# Patient Record
Sex: MALE | Race: WHITE | NOT HISPANIC OR LATINO | Employment: OTHER | URBAN - METROPOLITAN AREA
[De-identification: names, ages, dates, MRNs, and addresses within clinical notes are randomized per-mention and may not be internally consistent; named-entity substitution may affect disease eponyms.]

---

## 2024-08-06 ENCOUNTER — TELEPHONE (OUTPATIENT)
Dept: FAMILY MEDICINE CLINIC | Facility: CLINIC | Age: 69
End: 2024-08-06

## 2024-08-06 ENCOUNTER — OFFICE VISIT (OUTPATIENT)
Dept: FAMILY MEDICINE CLINIC | Facility: CLINIC | Age: 69
End: 2024-08-06

## 2024-08-06 VITALS
WEIGHT: 155 LBS | TEMPERATURE: 97.8 F | OXYGEN SATURATION: 97 % | BODY MASS INDEX: 28.52 KG/M2 | SYSTOLIC BLOOD PRESSURE: 122 MMHG | RESPIRATION RATE: 14 BRPM | DIASTOLIC BLOOD PRESSURE: 73 MMHG | HEIGHT: 62 IN | HEART RATE: 67 BPM

## 2024-08-06 DIAGNOSIS — I10 PRIMARY HYPERTENSION: Primary | ICD-10-CM

## 2024-08-06 DIAGNOSIS — E11.9 TYPE 2 DIABETES MELLITUS WITHOUT COMPLICATION, WITHOUT LONG-TERM CURRENT USE OF INSULIN (HCC): ICD-10-CM

## 2024-08-06 DIAGNOSIS — Z86.73 HISTORY OF CVA (CEREBROVASCULAR ACCIDENT): ICD-10-CM

## 2024-08-06 DIAGNOSIS — E78.5 HYPERLIPIDEMIA, UNSPECIFIED HYPERLIPIDEMIA TYPE: ICD-10-CM

## 2024-08-06 DIAGNOSIS — N40.0 BENIGN PROSTATIC HYPERPLASIA, UNSPECIFIED WHETHER LOWER URINARY TRACT SYMPTOMS PRESENT: ICD-10-CM

## 2024-08-06 PROCEDURE — 99204 OFFICE O/P NEW MOD 45 MIN: CPT | Performed by: PHYSICIAN ASSISTANT

## 2024-08-06 PROCEDURE — 83036 HEMOGLOBIN GLYCOSYLATED A1C: CPT | Performed by: PHYSICIAN ASSISTANT

## 2024-08-06 RX ORDER — TAMSULOSIN HYDROCHLORIDE 0.4 MG/1
0.4 CAPSULE ORAL DAILY
Qty: 90 CAPSULE | Refills: 1 | Status: SHIPPED | OUTPATIENT
Start: 2024-08-06

## 2024-08-06 RX ORDER — TAMSULOSIN HYDROCHLORIDE 0.4 MG/1
0.4 CAPSULE ORAL DAILY
COMMUNITY
Start: 2024-07-05 | End: 2024-08-06 | Stop reason: SDUPTHER

## 2024-08-06 RX ORDER — SIMVASTATIN 40 MG
40 TABLET ORAL EVERY EVENING
Qty: 90 TABLET | Refills: 1 | Status: SHIPPED | OUTPATIENT
Start: 2024-08-06

## 2024-08-06 RX ORDER — LOSARTAN POTASSIUM 50 MG/1
50 TABLET ORAL DAILY
COMMUNITY
Start: 2024-07-26 | End: 2024-08-06 | Stop reason: SDUPTHER

## 2024-08-06 RX ORDER — LOSARTAN POTASSIUM 50 MG/1
50 TABLET ORAL DAILY
Qty: 90 TABLET | Refills: 1 | Status: SHIPPED | OUTPATIENT
Start: 2024-08-06

## 2024-08-06 RX ORDER — SIMVASTATIN 40 MG
40 TABLET ORAL EVERY EVENING
COMMUNITY
Start: 2024-07-05 | End: 2024-08-06 | Stop reason: SDUPTHER

## 2024-08-06 RX ORDER — SITAGLIPTIN 100 MG/1
100 TABLET, FILM COATED ORAL DAILY
COMMUNITY
Start: 2024-07-05 | End: 2024-08-06 | Stop reason: SDUPTHER

## 2024-08-06 NOTE — PATIENT INSTRUCTIONS
Financial Counselors   [phone]      [facility]  [address] [place] [address]   [phone]      [facility]  [address] [place], [facility] [address]  [place], [place] [address] [phone]  Fax#: [phone]    Dr. Olman Goldberg  [address] [facility] [address], [place], [place] [address] [phone]    [facility]  [address] [place] [address]  [phone]    [facility]  [address] [place], [place] [address] [phone]    [facility]  [address] [address], [place], [place] [id number] [phone]    [facility]  [address] [place] [address]   [phone]

## 2024-08-06 NOTE — LETTER
August 6, 2024     Patient: Duncan Sánchez  YOB: 1955  Date of Visit: 8/6/2024      To Whom it May Concern:    Duncan Sánchez is under my professional care. Duncan was seen in my office on 8/6/2024. Duncan has the following medical conditions: hypertension, hyperlipidemia, type 2 diabetes mellitus, history of stroke. Patient is unable to complete activities of daily living on his own and requires help including bathing, dressing, cooking, cleaning.   Due to these medical conditions, it is medically necessary for patient to have a home healthcare aide.     If you have any questions or concerns, please don't hesitate to call.         Sincerely,          Mena Alexis PA-C        CC: No Recipients

## 2024-08-06 NOTE — TELEPHONE ENCOUNTER
Did not call pt today. Called pt to verify. Pt denies needs.   Pt is requesting if he can receive call from social work, pt needs help with getting new [place] insurance and needs help with other difficulties. Please advise.

## 2024-08-06 NOTE — PROGRESS NOTES
Assessment/Plan:   -Will plan to complete updated labs once patient obtains insurance.    Primary hypertension  - Continue losartan 50 mg once daily.   - Currently blood pressure is controlled at this time.  Reviewed BP target goal with patient.    - Continue to maintain healthy balanced diet with focus on low salt intake. Limit alcohol intake.   - Advised to exercise at least 30 minutes a day for at least 5 days out of the week.     Hyperlipidemia  - Continue simvastatin 40 mg daily.    Type 2 diabetes mellitus without complication, without long-term current use of insulin (HCC)  - POCT hemoglobin A1c 6.2.  Patient unsure of his previous A1c.  - Continue metformin 1000 mg twice daily and Januvia 100 mg daily.  - Continue following diabetic diet with focus on low intake of carbohydrates and sugars.  - Will plan to perform diabetic foot exam at next visit.  - Patient unsure when last diabetic eye exam was.  Will plan to complete diabetic iris exam at future visit.    Lab Results   Component Value Date    HGBA1C 6.2 08/10/2024       Benign prostatic hyperplasia  - Stable.  Continue tamsulosin 0.4 mg daily.    History of CVA (cerebrovascular accident)  - Per patient, occurred around 2021. Residual right lower extremity weakness.   - Continue statin.   - Home healthcare aide letter provided to patient.       Return in about 1 month (around 9/6/2024) for Next scheduled follow up HTN; plz direct to financial counselors, thanks! .      Diagnoses and all orders for this visit:    Primary hypertension  -     losartan (COZAAR) 50 mg tablet; Take 1 tablet (50 mg total) by mouth daily    Hyperlipidemia, unspecified hyperlipidemia type  -     simvastatin (ZOCOR) 40 mg tablet; Take 1 tablet (40 mg total) by mouth every evening    Type 2 diabetes mellitus without complication, without long-term current use of insulin (HCC)  -     POCT hemoglobin A1c  -     Ambulatory Referral to Ophthalmology; Future  -     sitaGLIPtin (Januvia)  100 mg tablet; Take 1 tablet (100 mg total) by mouth daily  -     metFORMIN (GLUCOPHAGE) 1000 MG tablet; Take 1 tablet (1,000 mg total) by mouth 2 (two) times a day with meals    Benign prostatic hyperplasia, unspecified whether lower urinary tract symptoms present  -     tamsulosin (FLOMAX) 0.4 mg; Take 1 capsule (0.4 mg total) by mouth daily    History of CVA (cerebrovascular accident)    Other orders  -     Discontinue: Januvia 100 MG tablet; Take 100 mg by mouth daily  -     Discontinue: simvastatin (ZOCOR) 40 mg tablet; Take 40 mg by mouth every evening  -     Discontinue: metFORMIN (GLUCOPHAGE) 1000 MG tablet; Take 1,000 mg by mouth 2 (two) times a day with meals  -     Discontinue: losartan (COZAAR) 50 mg tablet; Take 50 mg by mouth daily  -     Discontinue: tamsulosin (FLOMAX) 0.4 mg; Take 0.4 mg by mouth daily        All of patients questions were answered. Patient understands and agrees with the above plan.     Mena Alexis PA-C  08/06/24  [facility]       Subjective:     Duncan Sánchez is a 69 y.o. male who  has no past medical history on file. who presented to the office today to establish care.     - Patient is a 69 y.o. male who presents today to establish care.  Patient notes he had been living in [place] and recently moved to [place] about 1 month ago.  Patient notes he currently has [place] Medicaid/Medicare so he is looking to switch his insurance.  Patient notes he has a home health care aide and requires an updated letter to continue with his home health care aide.  Patient notes he has a history of diabetes, high blood pressure, high cholesterol, BPH, history of stroke 3 years ago.  Patient is unsure of his last A1c.    -Patient notes since he suffered a stroke 3 years ago, he experiences some weakness of his right leg.  Patient notes he requires assistance with showering, putting his clothes on, cooking, cleaning.  Patient notes he does live with his home health aide.  Patient  reports recently he has been experiencing some forgetfulness as well.  Patient notes he uses both walker and cane.  He notes he does have some balance issues at times.    -Patient notes around 2019 he had been diagnosed with COVID-19 and was in the hospital.  Patient notes he fell while he was in the hospital and required surgery of his right leg.    Life Style  Tobacco Use: No  Alcohol Use: No  Drug Use: No      No current outpatient medications on file prior to visit.     No current facility-administered medications on file prior to visit.     History reviewed. No pertinent past medical history.  History reviewed. No pertinent surgical history.  Social History     Socioeconomic History    Marital status: Legally      Spouse name: None    Number of children: None    Years of education: None    Highest education level: None   Occupational History    None   Tobacco Use    Smoking status: Never    Smokeless tobacco: Never   Vaping Use    Vaping status: Never Used   Substance and Sexual Activity    Alcohol use: Yes     Comment: socially    Drug use: Never    Sexual activity: None   Other Topics Concern    None   Social History Narrative    None     Social Determinants of Health     Financial Resource Strain: Not on file   Food Insecurity: Not on file   Transportation Needs: Not on file   Physical Activity: Not on file   Stress: Not on file   Social Connections: Not on file   Intimate Partner Violence: Not on file   Housing Stability: Not on file     History reviewed. No pertinent family history.      Review of Systems   Constitutional:  Negative for chills and fever.   HENT:  Negative for congestion and sore throat.    Eyes:  Negative for pain and visual disturbance.   Respiratory:  Negative for cough, chest tightness and shortness of breath.    Cardiovascular:  Negative for chest pain, palpitations and leg swelling.   Gastrointestinal:  Negative for abdominal pain, constipation, diarrhea, nausea and vomiting.  "  Genitourinary:  Negative for difficulty urinating and dysuria.   Musculoskeletal:  Positive for arthralgias.   Skin:  Negative for rash.   Neurological:  Positive for numbness. Negative for dizziness and headaches.         BMI Counseling: Body mass index is 28.52 kg/m². The BMI is above normal. Nutrition recommendations include decreasing portion sizes, encouraging healthy choices of fruits and vegetables, consuming healthier snacks, limiting drinks that contain sugar, moderation in carbohydrate intake, increasing intake of lean protein and reducing intake of cholesterol. Exercise recommendations include moderate physical activity 150 minutes/week. No pharmacotherapy was ordered. Rationale for BMI follow-up plan is due to patient being overweight or obese.     Depression Screening and Follow-up Plan: Patient was screened for depression during today's encounter. They screened negative with a PHQ-2 score of 0.          Objective:  /73 (BP Location: Left arm, Patient Position: Sitting, Cuff Size: Standard)   Pulse 67   Temp 97.8 °F (36.6 °C) (Temporal)   Resp 14   Ht 5' 1.81\" (1.57 m)   Wt 70.3 kg (155 lb)   SpO2 97%   BMI 28.52 kg/m²     Physical Exam  Vitals and nursing note reviewed.   Constitutional:       General: He is not in acute distress.     Appearance: He is well-developed.   HENT:      Head: Normocephalic and atraumatic.      Right Ear: External ear normal.      Left Ear: External ear normal.      Nose: Nose normal.   Eyes:      Conjunctiva/sclera: Conjunctivae normal.   Cardiovascular:      Rate and Rhythm: Normal rate and regular rhythm.      Pulses: Normal pulses.      Heart sounds: Normal heart sounds.   Pulmonary:      Effort: Pulmonary effort is normal. No respiratory distress.      Breath sounds: Normal breath sounds. No wheezing.   Musculoskeletal:      Cervical back: Normal range of motion and neck supple.   Skin:     General: Skin is warm and dry.   Neurological:      Mental Status: " He is alert and oriented to person, place, and time.      Gait: Gait abnormal.   Psychiatric:         Behavior: Behavior normal.         PHQ-2/9 Depression Screening    Little interest or pleasure in doing things: 0 - not at all  Feeling down, depressed, or hopeless: 0 - not at all  PHQ-2 Score: 0  PHQ-2 Interpretation: Negative depression screen       - Utilized [email or web address] services for translation.    I have spent a total time of 50 minutes in caring for this patient on the day of the visit/encounter including Patient and family education, Documenting in the medical record, and Obtaining or reviewing history  .

## 2024-08-08 ENCOUNTER — PATIENT OUTREACH (OUTPATIENT)
Dept: FAMILY MEDICINE CLINIC | Facility: CLINIC | Age: 69
End: 2024-08-08

## 2024-08-08 DIAGNOSIS — Z78.9 NEEDS ASSISTANCE WITH COMMUNITY RESOURCES: Primary | ICD-10-CM

## 2024-08-08 NOTE — PROGRESS NOTES
ABHIJEET WOLF did receive an IB message from  staff stating that Pt is requesting if he can receive call from social work, pt needs help with getting new [place] insurance and needs help with other difficulties.     After chart review ABHIJEET WOLF called Pt to assist as needed. ABHIJEET WOLF introduced herself, her role and explained Pt the purpose of this call in [place]. Pt stated that he moved from [facility] to [place]. He has a house in [place] and is trying to sell it. He lives in  a room at his care giver house, he receives SSD, applied for [place] and S[place] benefits. Pt expressed that he needs assistance with rent assistance. ABHIJEET WOLF explained Pt that there are agencies that can help him with rent assistance if they have funding. ABHIJEET WOLF informed Pt that she will send the resources by mail. Pt agreeable to receive them.ABHIJEET WOLF will send Pt [employer] and [facility] information.     ABHIJEET WOLF and Pt discussed about [employer] and  [employer] however, Pt is aware that [facility] does not have funding at this time and order to receive help from [employer] Pt needs to have OP  treatment.     Regarding transportation and housing. Pt is willing to apply for that services in the future. ABHIJEET WOLF inquired Pt if there is any other social needs that Pt needs assistance with. Pt declined other social needs at this time. ABHIJEET WOLF informed Pt that this referral will be closed today however he can reach out the ABHIJEET  for further support Monday through Friday within office hours. Pt seems understanding and thankful for the ABHIJEET WOLF assistance.     Patient was referred on [employer] to    [facility]  [address] [place] [address]   [phone]    [facility]  [address] [id number] [phone]     ABHIJEET  is closing case today due to Pt denied social needs at this time.  ABHIJEET  is remain available for further assistance as needed.

## 2024-08-10 PROBLEM — N40.0 BENIGN PROSTATIC HYPERPLASIA: Status: ACTIVE | Noted: 2024-08-10

## 2024-08-10 LAB — SL AMB POCT HEMOGLOBIN AIC: 6.2 (ref ?–6.5)

## 2024-08-10 NOTE — ASSESSMENT & PLAN NOTE
- Per patient, occurred around 2021. Residual right lower extremity weakness.   - Continue statin.   - Home healthcare aide letter provided to patient.

## 2024-08-10 NOTE — ASSESSMENT & PLAN NOTE
- Continue losartan 50 mg once daily.   - Currently blood pressure is controlled at this time.  Reviewed BP target goal with patient.    - Continue to maintain healthy balanced diet with focus on low salt intake. Limit alcohol intake.   - Advised to exercise at least 30 minutes a day for at least 5 days out of the week.

## 2024-08-10 NOTE — ASSESSMENT & PLAN NOTE
- POCT hemoglobin A1c 6.2.  Patient unsure of his previous A1c.  - Continue metformin 1000 mg twice daily and Januvia 100 mg daily.  - Continue following diabetic diet with focus on low intake of carbohydrates and sugars.  - Will plan to perform diabetic foot exam at next visit.  - Patient unsure when last diabetic eye exam was.  Will plan to complete diabetic iris exam at future visit.    Lab Results   Component Value Date    HGBA1C 6.2 08/10/2024

## 2024-08-15 ENCOUNTER — TELEPHONE (OUTPATIENT)
Dept: FAMILY MEDICINE CLINIC | Facility: CLINIC | Age: 69
End: 2024-08-15

## 2024-08-15 NOTE — TELEPHONE ENCOUNTER
Pt states they received a letter regarding referral.     Informed that he has a referral to opthalmology. Asked them if they need number to make an appt. Pt states they already have the number and do not need it.

## 2024-08-19 ENCOUNTER — PATIENT OUTREACH (OUTPATIENT)
Dept: FAMILY MEDICINE CLINIC | Facility: CLINIC | Age: 69
End: 2024-08-19

## 2024-08-19 NOTE — PROGRESS NOTES
ABHIJEET WOLF did receive a call from Pt. Pt stated that he started [place] process and they asked for five years bank statements. He expressed was worried about it since was unsure to provide this information. ABHIJEET WOLF explained Pt that in order to complete the MA process he needs to provide the documents as requested. ABHIJEET WOLF inquired Pt if there is any other social needs or questions that he wants to share. Pt denied other social needs at this time. ABHIJEET WOLF explained Pt that he can reach out to ABHIJEET WOLF for further assistance Monday through Friday within office hours. Pt seems understanding and thankful for the ABHIJEET WOLF support.     ABHIJEET WOLF is remain available for further assistance as needed.

## 2024-09-03 ENCOUNTER — TELEPHONE (OUTPATIENT)
Dept: FAMILY MEDICINE CLINIC | Facility: CLINIC | Age: 69
End: 2024-09-03

## 2024-09-03 NOTE — TELEPHONE ENCOUNTER
The patient called and was told by the agency that the letter have to be be signed by a doctor.    Please advise, thank you

## 2024-09-03 NOTE — TELEPHONE ENCOUNTER
Tamika desai I just spoke with Mena regarding this pt  needs the letter that katye made for this pt be signed by a doctor do you think you can signed the letter so I can faxed to agency being that  isnt in the office today.     Please advise, thank you

## 2024-09-04 NOTE — TELEPHONE ENCOUNTER
Pt came to the office to  letter. Pt  request that letter be faxed to [facility] at     Fax been complete and letter given to the patient    Thank you

## 2024-09-04 NOTE — TELEPHONE ENCOUNTER
Pt called stating that the letter was not accepted by [employer].     [employer] will send a form that would need to be completed by his PCP    Office fax number provided.

## 2024-09-10 ENCOUNTER — TELEPHONE (OUTPATIENT)
Dept: FAMILY MEDICINE CLINIC | Facility: CLINIC | Age: 69
End: 2024-09-10

## 2024-09-10 NOTE — TELEPHONE ENCOUNTER
IEB FORM RECEIVED ON 9/10/2024  GIVEN TO TRI MENON TO BE COMPLETED, SIGNED BY MD/ (INCLUDE LISC. NUMBER), AND FAXED BACK IN 3 DAYS

## 2024-09-10 NOTE — TELEPHONE ENCOUNTER
FORM COMPLETED, SIGNED BY MD/ (INCLUDE LISC. NUMBER) FAXED ON 09/10/24 TO [facility] at [phone]. FAX CONFIRMATION RECEIVED. FORM GIVEN TO THOMAS TO SCAN

## 2024-09-17 ENCOUNTER — OFFICE VISIT (OUTPATIENT)
Dept: FAMILY MEDICINE CLINIC | Facility: CLINIC | Age: 69
End: 2024-09-17

## 2024-09-17 VITALS
SYSTOLIC BLOOD PRESSURE: 140 MMHG | OXYGEN SATURATION: 97 % | BODY MASS INDEX: 28.45 KG/M2 | DIASTOLIC BLOOD PRESSURE: 60 MMHG | HEART RATE: 67 BPM | HEIGHT: 62 IN | TEMPERATURE: 97.6 F | WEIGHT: 154.6 LBS | RESPIRATION RATE: 16 BRPM

## 2024-09-17 DIAGNOSIS — G62.9 NEUROPATHY: ICD-10-CM

## 2024-09-17 DIAGNOSIS — E78.5 HYPERLIPIDEMIA, UNSPECIFIED HYPERLIPIDEMIA TYPE: ICD-10-CM

## 2024-09-17 DIAGNOSIS — E11.9 TYPE 2 DIABETES MELLITUS WITHOUT COMPLICATION, WITHOUT LONG-TERM CURRENT USE OF INSULIN (HCC): ICD-10-CM

## 2024-09-17 DIAGNOSIS — Z86.73 HISTORY OF CVA (CEREBROVASCULAR ACCIDENT): ICD-10-CM

## 2024-09-17 DIAGNOSIS — Z12.11 COLON CANCER SCREENING: ICD-10-CM

## 2024-09-17 DIAGNOSIS — R26.89 BALANCE PROBLEM: ICD-10-CM

## 2024-09-17 DIAGNOSIS — G89.29 CHRONIC PAIN OF BOTH SHOULDERS: ICD-10-CM

## 2024-09-17 DIAGNOSIS — M54.50 ACUTE LEFT-SIDED LOW BACK PAIN WITHOUT SCIATICA: ICD-10-CM

## 2024-09-17 DIAGNOSIS — I10 PRIMARY HYPERTENSION: ICD-10-CM

## 2024-09-17 DIAGNOSIS — M25.511 CHRONIC PAIN OF BOTH SHOULDERS: ICD-10-CM

## 2024-09-17 DIAGNOSIS — M25.512 CHRONIC PAIN OF BOTH SHOULDERS: ICD-10-CM

## 2024-09-17 DIAGNOSIS — Z78.9 NEED FOR FOLLOW-UP BY SOCIAL WORKER: ICD-10-CM

## 2024-09-17 DIAGNOSIS — N40.0 BENIGN PROSTATIC HYPERPLASIA, UNSPECIFIED WHETHER LOWER URINARY TRACT SYMPTOMS PRESENT: ICD-10-CM

## 2024-09-17 DIAGNOSIS — Z00.00 ANNUAL PHYSICAL EXAM: Primary | ICD-10-CM

## 2024-09-17 PROCEDURE — 99397 PER PM REEVAL EST PAT 65+ YR: CPT | Performed by: PHYSICIAN ASSISTANT

## 2024-09-17 PROCEDURE — 99215 OFFICE O/P EST HI 40 MIN: CPT | Performed by: PHYSICIAN ASSISTANT

## 2024-09-17 RX ORDER — CYCLOBENZAPRINE HCL 10 MG
10 TABLET ORAL 2 TIMES DAILY PRN
Qty: 60 TABLET | Refills: 1 | Status: SHIPPED | OUTPATIENT
Start: 2024-09-17

## 2024-09-17 RX ORDER — TAMSULOSIN HYDROCHLORIDE 0.4 MG/1
0.4 CAPSULE ORAL DAILY
Qty: 90 CAPSULE | Refills: 1 | Status: SHIPPED | OUTPATIENT
Start: 2024-09-17

## 2024-09-17 RX ORDER — GABAPENTIN 300 MG/1
300 CAPSULE ORAL
Qty: 90 CAPSULE | Refills: 1 | Status: SHIPPED | OUTPATIENT
Start: 2024-09-17

## 2024-09-17 NOTE — PATIENT INSTRUCTIONS
Please call central scheduling at [phone] to schedule colonoscopy. Thanks!   Please call physical therapy to schedule an appointment. Thanks!   Please complete your blood work at earliest convenience. Thanks!     [facility]- Colonoscopy  Phone Number: [phone]      Dr. Isiah Sharp, DPM  [facility], [address] [address]  [place], [address]   [phone]

## 2024-09-17 NOTE — PROGRESS NOTES
ADULT ANNUAL PHYSICAL  [facility] [place]    NAME: Duncan Sánchez  AGE: 69 y.o. SEX: male  : 1955     DATE: 2024     Assessment and Plan:     Problem List Items Addressed This Visit          Cardiovascular and Mediastinum    Primary hypertension     - Continue losartan 50 mg once daily.   - Currently blood pressure is controlled at this time.  Reviewed BP target goal with patient.    - Continue to maintain healthy balanced diet with focus on low salt intake. Limit alcohol intake.   - Advised to exercise at least 30 minutes a day for at least 5 days out of the week.             Endocrine    Type 2 diabetes mellitus without complication, without long-term current use of insulin (HCC)     - Continue metformin 1000 mg twice daily and Januvia 100 mg daily.  - Continue following diabetic diet with focus on low intake of carbohydrates and sugars.  - Patient requesting referral to podiatry.  Referral placed today.  - Patient unsure when last diabetic eye exam was.  Patient requesting to see an eye doctor.  Referral previously placed.  Advised to call to schedule an appointment.    Lab Results   Component Value Date    HGBA1C 6.2 08/10/2024            Relevant Orders    Albumin / creatinine urine ratio (Completed)    CBC and differential (Completed)    Ambulatory Referral to Podiatry    Comprehensive metabolic panel (Completed)    Lipid panel (Completed)       Nervous and Auditory    Neuropathy     - Will trial gabapentin 300 mg daily at bedtime.          Relevant Medications    gabapentin (Neurontin) 300 mg capsule    Other Relevant Orders    Vitamin D 25 hydroxy (Completed)    Vitamin B12 (Completed)       Genitourinary    Benign prostatic hyperplasia     - Per patient, previously prescribed tamsulosin but was discontinued by his provider for unknown reason.  - Patient currently complaining of nocturia.  Will restart tamsulosin 0.4 mg daily.          Relevant Medications    tamsulosin (FLOMAX) 0.4 mg       Surgery/Wound/Pain    Acute left-sided low back pain without sciatica     - Will rx flexeril, BID PRN.  Discussed possible side effects of drowsiness and advised to avoid driving or operating heavy machinery when taking medication.  -Advised to apply ice/heating pad/topical therapies as needed to affected area.  - Provided patient with list of exercises to perform daily.  - If symptoms persist/worsen, would recommend referral to physical therapy.         Relevant Medications    cyclobenzaprine (FLEXERIL) 10 mg tablet    Chronic pain of both shoulders     - Will rx flexeril, BID PRN.  Discussed possible side effects of drowsiness and advised to avoid driving or operating heavy machinery when taking medication.  -Advised to apply ice/heating pad/topical therapies as needed to affected area.  - Provided patient with list of exercises to perform daily.  - If symptoms persist/worsen, would recommend referral to physical therapy.         Relevant Medications    cyclobenzaprine (FLEXERIL) 10 mg tablet       Neurology/Sleep    History of CVA (cerebrovascular accident)     - Per patient, occurred around 2021. Residual right lower extremity weakness.   - Patient experiencing balance issues. Currently using a cane.   - Will refer to physical therapy to help increase strength, mobility, function.  - Shower chair sent via [employer].   - Continue statin.          Relevant Orders    Ambulatory Referral to Physical Therapy    Balance problem    Relevant Orders    Ambulatory Referral to Physical Therapy       Other    Hyperlipidemia     - Continue simvastatin 40 mg daily.          Other Visit Diagnoses       Annual physical exam    -  Primary    Colon cancer screening        Relevant Orders    Ambulatory Referral to Gastroenterology    Need for follow-up by         Relevant Orders    Ambulatory Referral to Social Work Care Management Program             Immunizations and preventive care screenings were discussed with patient today. Appropriate education was printed on patient's after visit summary.    Counseling:  Alcohol/drug use: discussed moderation in alcohol intake, the recommendations for healthy alcohol use, and avoidance of illicit drug use.  Dental Health: discussed importance of regular tooth brushing, flossing, and dental visits.  Injury prevention: discussed safety/seat belts, safety helmets, smoke detectors, carbon dioxide detectors, and smoking near bedding or upholstery.  Sexual health: discussed sexually transmitted diseases, partner selection, use of condoms, avoidance of unintended pregnancy, and contraceptive alternatives.  Exercise: the importance of regular exercise/physical activity was discussed. Recommend exercise 3-5 times per week for at least 30 minutes.          Return in about 1 month (around 10/17/2024) for Next scheduled follow up T2DM (40  mins).     Chief Complaint:     Chief Complaint   Patient presents with    Follow-up    Hypertension      History of Present Illness:     Adult Annual Physical   Patient here for a comprehensive physical exam.     -Patient reports he did have a colonoscopy completed about 10 years ago when living in [place], but he never received the results.  He is open to completing repeat colonoscopy.    -Patient notes he does experience constipation frequently, however his home health caregiver provides him home remedies that helped him a lot.  He prefers to stick with the home remedies instead of medication at this time.    -Patient is requesting to see a foot doctor.    -Patient notes for long time he has been experiencing left lower back pain when bending or standing up.  Patient denies any known injury or trauma to the area. He denies any fevers, numbness or tingling down the legs, saddle anesthesia, or loss of bladder or bowel function.  Patient notes he also experiences chronic pain of bilateral  "shoulders.  He denies any injury or trauma.  He denies any numbness or tingling down bilateral arms.    -Patient notes since he suffered a stroke, he feels frequent \"pinching sensation\" of bilateral lower legs and numbness and tingling sensation of bilateral feet.     -Patient notes he has been experiencing some issues with his balance.  Patient notes he had fallen in the shower about 1 year ago.  Patient notes he previously had a shower chair but never used it.  Patient would like a shower chair for his new place.  Patient would like to try physical therapy to help with his balance.  He currently does use a cane.    -Patient notes he had life alert when he was living in [place] and he would like something similar since he has moved to [place].    -Patient notes he is urinating a lot throughout the night, generally waking up every hour.  Patient reports he was previously taking tamsulosin, but he reports his doctor discontinued it for unknown reason.    Diet and Physical Activity  Diet/Nutrition: heart healthy (low sodium) diet.   Exercise: walking.     General Health  Sleep:  average .   Hearing: normal - bilateral.  Vision: wears glasses and wants to establish with eye doctor. Referral previously placed. Advised to call to schedule an appointment.  .   Dental: no dental visits for >1 year.        Review of Systems:     Review of Systems   Constitutional:  Negative for chills and fever.   HENT:  Negative for congestion and sore throat.    Eyes:  Negative for pain and visual disturbance.   Respiratory:  Negative for cough, chest tightness and shortness of breath.    Cardiovascular:  Negative for chest pain, palpitations and leg swelling.   Gastrointestinal:  Positive for constipation (occasionally, controlled with home remedies). Negative for abdominal pain, diarrhea, nausea and vomiting.   Genitourinary:  Positive for frequency (at night). Negative for difficulty urinating and dysuria.   Musculoskeletal:  " Positive for arthralgias, back pain and gait problem.   Skin:  Negative for rash.   Neurological:  Positive for numbness. Negative for dizziness and headaches.        Balance issues      Past Medical History:     History reviewed. No pertinent past medical history.   Past Surgical History:     History reviewed. No pertinent surgical history.   Social History:     Social History     Socioeconomic History    Marital status: Legally      Spouse name: None    Number of children: None    Years of education: None    Highest education level: None   Occupational History    None   Tobacco Use    Smoking status: Never    Smokeless tobacco: Never   Vaping Use    Vaping status: Never Used   Substance and Sexual Activity    Alcohol use: Yes     Comment: socially    Drug use: Never    Sexual activity: None   Other Topics Concern    None   Social History Narrative    None     Social Determinants of Health     Financial Resource Strain: Not on file   Food Insecurity: Not on file   Transportation Needs: Not on file   Physical Activity: Not on file   Stress: Not on file   Social Connections: Not on file   Intimate Partner Violence: Not on file   Housing Stability: Not on file       Social Determinants of Health     Tobacco Use: Low Risk  (9/23/2024)    Patient History     Smoking Tobacco Use: Never     Smokeless Tobacco Use: Never     Passive Exposure: Not on file   Alcohol Use: Not on file   Financial Resource Strain: Not on file   Food Insecurity: Not on file   Transportation Needs: Not on file   Physical Activity: Not on file   Stress: Not on file   Social Connections: Not on file   Intimate Partner Violence: Not on file   Depression: Not at risk (8/6/2024)    PHQ-2     PHQ-2 Score: 0   Housing Stability: Not on file   Utilities: Not on file   Health Literacy: Not on file        Family History:     History reviewed. No pertinent family history.   Current Medications:     Current Outpatient Medications   Medication Sig  "Dispense Refill    cyclobenzaprine (FLEXERIL) 10 mg tablet Take 1 tablet (10 mg total) by mouth 2 (two) times a day as needed for muscle spasms 60 tablet 1    gabapentin (Neurontin) 300 mg capsule Take 1 capsule (300 mg total) by mouth daily at bedtime 90 capsule 1    tamsulosin (FLOMAX) 0.4 mg Take 1 capsule (0.4 mg total) by mouth daily 90 capsule 1    losartan (COZAAR) 50 mg tablet Take 1 tablet (50 mg total) by mouth daily 90 tablet 1    metFORMIN (GLUCOPHAGE) 1000 MG tablet Take 1 tablet (1,000 mg total) by mouth 2 (two) times a day with meals 180 tablet 1    simvastatin (ZOCOR) 40 mg tablet Take 1 tablet (40 mg total) by mouth every evening 90 tablet 1    sitaGLIPtin (Januvia) 100 mg tablet Take 1 tablet (100 mg total) by mouth daily 90 tablet 1     No current facility-administered medications for this visit.      Allergies:     No Known Allergies   Physical Exam:     /60 (BP Location: Left arm, Patient Position: Sitting, Cuff Size: Standard)   Pulse 67   Temp 97.6 °F (36.4 °C) (Temporal)   Resp 16   Ht 5' 1.81\" (1.57 m)   Wt 70.1 kg (154 lb 9.6 oz)   SpO2 97%   BMI 28.45 kg/m²     Physical Exam  Vitals and nursing note reviewed.   Constitutional:       General: He is not in acute distress.     Appearance: He is well-developed.   HENT:      Head: Normocephalic and atraumatic.      Right Ear: External ear normal.      Left Ear: External ear normal.      Nose: Nose normal.   Eyes:      Conjunctiva/sclera: Conjunctivae normal.   Cardiovascular:      Rate and Rhythm: Normal rate and regular rhythm.      Pulses: Normal pulses.      Heart sounds: Normal heart sounds.   Pulmonary:      Effort: Pulmonary effort is normal. No respiratory distress.      Breath sounds: Normal breath sounds. No wheezing.   Musculoskeletal:      Cervical back: Normal range of motion and neck supple.      Lumbar back: Tenderness (left paralumbar area) present. No swelling. Decreased range of motion. Negative right straight leg " raise test and negative left straight leg raise test.   Skin:     General: Skin is warm and dry.   Neurological:      Mental Status: He is alert and oriented to person, place, and time.      Gait: Gait abnormal.   Psychiatric:         Behavior: Behavior normal.         - Utilized [employer] services for translation.    I have spent a total time of 60 minutes in caring for this patient on the day of the visit/encounter including Risks and benefits of tx options, Instructions for management, Risk factor reductions, Impressions, Counseling / Coordination of care, Documenting in the medical record, and Reviewing / ordering tests, medicine, procedures  .        Mena Alexis PA-C   [facility] [place]

## 2024-09-18 ENCOUNTER — APPOINTMENT (OUTPATIENT)
Dept: LAB | Facility: HOSPITAL | Age: 69
End: 2024-09-18
Payer: MEDICARE

## 2024-09-18 ENCOUNTER — TELEPHONE (OUTPATIENT)
Age: 69
End: 2024-09-18

## 2024-09-18 ENCOUNTER — PREP FOR PROCEDURE (OUTPATIENT)
Age: 69
End: 2024-09-18

## 2024-09-18 DIAGNOSIS — G62.9 NEUROPATHY: ICD-10-CM

## 2024-09-18 DIAGNOSIS — Z12.11 SCREENING FOR COLON CANCER: Primary | ICD-10-CM

## 2024-09-18 DIAGNOSIS — E11.9 TYPE 2 DIABETES MELLITUS WITHOUT COMPLICATION, WITHOUT LONG-TERM CURRENT USE OF INSULIN (HCC): ICD-10-CM

## 2024-09-18 LAB
25(OH)D3 SERPL-MCNC: 28.1 NG/ML (ref 30–100)
ALBUMIN SERPL BCG-MCNC: 4.3 G/DL (ref 3.5–5)
ALP SERPL-CCNC: 55 U/L (ref 34–104)
ALT SERPL W P-5'-P-CCNC: 12 U/L (ref 7–52)
ANION GAP SERPL CALCULATED.3IONS-SCNC: 8 MMOL/L (ref 4–13)
AST SERPL W P-5'-P-CCNC: 13 U/L (ref 13–39)
BASOPHILS # BLD AUTO: 0.06 THOUSANDS/ΜL (ref 0–0.1)
BASOPHILS NFR BLD AUTO: 1 % (ref 0–1)
BILIRUB SERPL-MCNC: 0.39 MG/DL (ref 0.2–1)
BUN SERPL-MCNC: 13 MG/DL (ref 5–25)
CALCIUM SERPL-MCNC: 9.7 MG/DL (ref 8.4–10.2)
CHLORIDE SERPL-SCNC: 103 MMOL/L (ref 96–108)
CHOLEST SERPL-MCNC: 132 MG/DL
CO2 SERPL-SCNC: 28 MMOL/L (ref 21–32)
CREAT SERPL-MCNC: 0.84 MG/DL (ref 0.6–1.3)
CREAT UR-MCNC: 126.9 MG/DL
EOSINOPHIL # BLD AUTO: 0.17 THOUSAND/ΜL (ref 0–0.61)
EOSINOPHIL NFR BLD AUTO: 2 % (ref 0–6)
ERYTHROCYTE [DISTWIDTH] IN BLOOD BY AUTOMATED COUNT: 13 % (ref 11.6–15.1)
GFR SERPL CREATININE-BSD FRML MDRD: 88 ML/MIN/1.73SQ M
GLUCOSE P FAST SERPL-MCNC: 146 MG/DL (ref 65–99)
HCT VFR BLD AUTO: 36.2 % (ref 36.5–49.3)
HDLC SERPL-MCNC: 48 MG/DL
HGB BLD-MCNC: 11.6 G/DL (ref 12–17)
IMM GRANULOCYTES # BLD AUTO: 0.03 THOUSAND/UL (ref 0–0.2)
IMM GRANULOCYTES NFR BLD AUTO: 0 % (ref 0–2)
LDLC SERPL CALC-MCNC: 72 MG/DL (ref 0–100)
LYMPHOCYTES # BLD AUTO: 3.4 THOUSANDS/ΜL (ref 0.6–4.47)
LYMPHOCYTES NFR BLD AUTO: 37 % (ref 14–44)
MCH RBC QN AUTO: 29.4 PG (ref 26.8–34.3)
MCHC RBC AUTO-ENTMCNC: 32 G/DL (ref 31.4–37.4)
MCV RBC AUTO: 92 FL (ref 82–98)
MICROALBUMIN UR-MCNC: 12.2 MG/L
MICROALBUMIN/CREAT 24H UR: 10 MG/G CREATININE (ref 0–30)
MONOCYTES # BLD AUTO: 0.54 THOUSAND/ΜL (ref 0.17–1.22)
MONOCYTES NFR BLD AUTO: 6 % (ref 4–12)
NEUTROPHILS # BLD AUTO: 4.96 THOUSANDS/ΜL (ref 1.85–7.62)
NEUTS SEG NFR BLD AUTO: 54 % (ref 43–75)
NONHDLC SERPL-MCNC: 84 MG/DL
NRBC BLD AUTO-RTO: 0 /100 WBCS
PLATELET # BLD AUTO: 308 THOUSANDS/UL (ref 149–390)
PMV BLD AUTO: 10.9 FL (ref 8.9–12.7)
POTASSIUM SERPL-SCNC: 4.3 MMOL/L (ref 3.5–5.3)
PROT SERPL-MCNC: 7.4 G/DL (ref 6.4–8.4)
RBC # BLD AUTO: 3.95 MILLION/UL (ref 3.88–5.62)
SODIUM SERPL-SCNC: 139 MMOL/L (ref 135–147)
TRIGL SERPL-MCNC: 59 MG/DL
VIT B12 SERPL-MCNC: 529 PG/ML (ref 180–914)
WBC # BLD AUTO: 9.16 THOUSAND/UL (ref 4.31–10.16)

## 2024-09-18 PROCEDURE — 82570 ASSAY OF URINE CREATININE: CPT

## 2024-09-18 PROCEDURE — 85025 COMPLETE CBC W/AUTO DIFF WBC: CPT

## 2024-09-18 PROCEDURE — 80061 LIPID PANEL: CPT

## 2024-09-18 PROCEDURE — 36415 COLL VENOUS BLD VENIPUNCTURE: CPT

## 2024-09-18 PROCEDURE — 80053 COMPREHEN METABOLIC PANEL: CPT

## 2024-09-18 PROCEDURE — 82043 UR ALBUMIN QUANTITATIVE: CPT

## 2024-09-18 PROCEDURE — 82607 VITAMIN B-12: CPT

## 2024-09-18 PROCEDURE — 82306 VITAMIN D 25 HYDROXY: CPT

## 2024-09-18 NOTE — TELEPHONE ENCOUNTER
09/18/24  Screened by: Guadalupe Oneill    Referring Provider Reuben    Pre- Screening:     There is no height or weight on file to calculate BMI.  Has patient been referred for a routine screening Colonoscopy? yes  Is the patient between 45-75 years old? yes      Previous Colonoscopy yes   If yes:    Date: ??    Facility:     Reason:         Does the patient want to see a Gastroenterologist prior to their procedure OR are they having any GI symptoms? no    Has the patient been hospitalized or had abdominal surgery in the past 6 months? no    Does the patient use supplemental oxygen? no    Does the patient take Coumadin, Lovenox, Plavix, Elliquis, Xarelto, or other blood thinning medication? no    Has the patient had a stroke, cardiac event, or stent placed in the past year? no      If patient is between 45yrs - 49yrs, please advise patient that we will have to confirm benefits & coverage with their insurance company for a routine screening colonoscopy.

## 2024-09-18 NOTE — TELEPHONE ENCOUNTER
Scheduled date of colonoscopy (as of today):10/04/24  Physician performing colonoscopy:Ohm  Location of colonoscopy:   Bowel prep reviewed with patient: LILIANA/VALERIA mailed to pt  Instructions reviewed with patient by: SATISH  Clearances: Diabetic    : will call back

## 2024-09-18 NOTE — TELEPHONE ENCOUNTER
Please mail LILIANA/DUL prep instructions as well as diabetic prep instructions, both is Citizen of [place] if possible to pt. He is scheduled for colon with Dr Dietrich on 10/04/24

## 2024-09-23 PROBLEM — R26.89 BALANCE PROBLEM: Status: ACTIVE | Noted: 2024-09-23

## 2024-09-23 PROBLEM — M25.512 CHRONIC PAIN OF BOTH SHOULDERS: Status: ACTIVE | Noted: 2024-09-23

## 2024-09-23 PROBLEM — G89.29 CHRONIC PAIN OF BOTH SHOULDERS: Status: ACTIVE | Noted: 2024-09-23

## 2024-09-23 PROBLEM — G62.9 NEUROPATHY: Status: ACTIVE | Noted: 2024-09-23

## 2024-09-23 PROBLEM — M25.511 CHRONIC PAIN OF BOTH SHOULDERS: Status: ACTIVE | Noted: 2024-09-23

## 2024-09-23 PROBLEM — M54.50 ACUTE LEFT-SIDED LOW BACK PAIN WITHOUT SCIATICA: Status: ACTIVE | Noted: 2024-09-23

## 2024-09-23 NOTE — ASSESSMENT & PLAN NOTE
- Per patient, previously prescribed tamsulosin but was discontinued by his provider for unknown reason.  - Patient currently complaining of nocturia.  Will restart tamsulosin 0.4 mg daily.

## 2024-09-24 ENCOUNTER — TELEPHONE (OUTPATIENT)
Dept: GASTROENTEROLOGY | Facility: MEDICAL CENTER | Age: 69
End: 2024-09-24

## 2024-09-24 NOTE — ASSESSMENT & PLAN NOTE
- Continue metformin 1000 mg twice daily and Januvia 100 mg daily.  - Continue following diabetic diet with focus on low intake of carbohydrates and sugars.  - Patient requesting referral to podiatry.  Referral placed today.  - Patient unsure when last diabetic eye exam was.  Patient requesting to see an eye doctor.  Referral previously placed.  Advised to call to schedule an appointment.    Lab Results   Component Value Date    HGBA1C 6.2 08/10/2024

## 2024-09-24 NOTE — ASSESSMENT & PLAN NOTE
- Per patient, occurred around 2021. Residual right lower extremity weakness.   - Patient experiencing balance issues. Currently using a cane.   - Will refer to physical therapy to help increase strength, mobility, function.  - [facility]ower [place] [facility]   - Continue statin.

## 2024-09-24 NOTE — ASSESSMENT & PLAN NOTE
- Will rx flexeril, BID PRN.  Discussed possible side effects of drowsiness and advised to avoid driving or operating heavy machinery when taking medication.  -Advised to apply ice/heating pad/topical therapies as needed to affected area.  - Provided patient with list of exercises to perform daily.  - If symptoms persist/worsen, would recommend referral to physical therapy.

## 2024-09-24 NOTE — TELEPHONE ENCOUNTER
Mailed Prep instructions in Polish to patient address on file     Schedule Colonoscopy  (Newest Message First)  View All Conversations on this Encounter  Guadalupe Oneill routed conversation to Gastroenterology Madeleine Corral6 days ago     Guadalupe Oneill6 days ago     SATISH  Please mail LILIANA/DUL prep instructions as well as diabetic prep instructions, both is Urdu if possible to pt. He is scheduled for colon with Dr Dietrich on 10/04/24         Note         Guadalupe Oneill6 days ago     SATISH  Scheduled date of colonoscopy (as of today):10/04/24  Physician performing colonoscopy:Ohm  Location of colonoscopy:   Bowel prep reviewed with patient: LILIANA/DUL mailed to pt  Instructions reviewed with patient by: SATISH  Clearances: Diabetic     : will call back         Note         Guadalupe Oneill6 days ago     SATISH  09/18/24  Screened by: Guadalupe Oneill     Referring Provider Reuben     Pre- Screening:      There is no height or weight on file to calculate BMI.  Has patient been referred for a routine screening Colonoscopy? yes  Is the patient between 45-75 years old? yes        Previous Colonoscopy yes   If yes:    Date: ??    Facility:     Reason:            Does the patient want to see a Gastroenterologist prior to their procedure OR are they having any GI symptoms? no     Has the patient been hospitalized or had abdominal surgery in the past 6 months? no     Does the patient use supplemental oxygen? no     Does the patient take Coumadin, Lovenox, Plavix, Elliquis, Xarelto, or other blood thinning medication? no     Has the patient had a stroke, cardiac event, or stent placed in the past year? no        If patient is between 45yrs - 49yrs, please advise patient that we will have to confirm benefits & coverage with their insurance company for a routine screening colonoscopy.           Note         Recent Patient Communication     Last Update Description Specialty     6 days ago Schedule Colonoscopy Gastroenterology     Pg  Gastroenterology Pod Guadalupe Oneill Closed     1 week ago [facility] Family Medicine     Mena Art Closed     2 weeks ago Letter for School/Work Family Medicine     Noah Alcala Closed     1 month ago -- Family Medicine     Kait Rodrigez Closed

## 2024-09-26 ENCOUNTER — TELEPHONE (OUTPATIENT)
Dept: GASTROENTEROLOGY | Facility: MEDICAL CENTER | Age: 69
End: 2024-09-26

## 2024-09-26 NOTE — TELEPHONE ENCOUNTER
Via #[id number]    Patient stated he did not yet receive the instructions:  Will email to: [email or web address]  Patient requested address for procedure    Requested patient please call back at [phone] with any further questions, if he does not receive the instructions or needs to reschedule.

## 2024-09-26 NOTE — TELEPHONE ENCOUNTER
Confirming Upcoming Procedure: Colonoscopy on October 4  Physician performing: Dr. Dietrich  Location of procedure:    Prep: Miralax  Diabetic: Metformin

## 2024-10-03 ENCOUNTER — PATIENT OUTREACH (OUTPATIENT)
Dept: FAMILY MEDICINE CLINIC | Facility: CLINIC | Age: 69
End: 2024-10-03

## 2024-10-03 NOTE — PROGRESS NOTES
ABHIJEET WOLF received consult from Provider regarding pt want to apply for a Life Alert. Pt had one in [facility] and would like to get one here.     Per chart review, pt is applying for Waiver Services. The IEB form was completed and faxed.     ABHIJEET WOLF placed call to pt to follow-up and further assess. Introduced self and role in [place]. Pt states he moved here from [place] and he is applying for Waiver Services. Pt states he has an agency that is helping him with the process, he still waiting to see how many hours he gets approved.   Pt states he also applied for SNAP benefit but he the application still spending.     Pt report he received SSD but his income goes toward the rent and food.     ABHIJEET WOLF explained to pt after he get approved for Waiver Services he can request a Life Alert system through Waiver Services. Informs pt he can request the services with his . Informs he will be assigned to a SC and the SC will assist him getting any additional services will need. Informs he can get MOM meal. Pt verbalized understanding and thanked ABHIJEET WOLF for the information. Informs pt if he has any issues getting approve for Waiver Services, he can reach out to ABHIJEET WOLF and we could further assist him. Pt verbalized understanding.     No Further outreach. Will remains available as needed.

## 2024-10-04 ENCOUNTER — HOSPITAL ENCOUNTER (OUTPATIENT)
Dept: GASTROENTEROLOGY | Facility: HOSPITAL | Age: 69
Setting detail: OUTPATIENT SURGERY
End: 2024-10-04
Attending: INTERNAL MEDICINE
Payer: MEDICARE

## 2024-10-04 ENCOUNTER — ANESTHESIA EVENT (OUTPATIENT)
Dept: GASTROENTEROLOGY | Facility: HOSPITAL | Age: 69
End: 2024-10-04
Payer: MEDICARE

## 2024-10-04 ENCOUNTER — TELEPHONE (OUTPATIENT)
Age: 69
End: 2024-10-04

## 2024-10-04 ENCOUNTER — ANESTHESIA (OUTPATIENT)
Dept: GASTROENTEROLOGY | Facility: HOSPITAL | Age: 69
End: 2024-10-04
Payer: MEDICARE

## 2024-10-04 VITALS
WEIGHT: 150 LBS | HEIGHT: 62 IN | SYSTOLIC BLOOD PRESSURE: 172 MMHG | HEART RATE: 66 BPM | RESPIRATION RATE: 18 BRPM | TEMPERATURE: 97.7 F | BODY MASS INDEX: 27.6 KG/M2 | DIASTOLIC BLOOD PRESSURE: 72 MMHG | OXYGEN SATURATION: 97 %

## 2024-10-04 DIAGNOSIS — Z12.11 COLON CANCER SCREENING: ICD-10-CM

## 2024-10-04 DIAGNOSIS — Z12.11 SCREENING FOR COLON CANCER: ICD-10-CM

## 2024-10-04 LAB — GLUCOSE SERPL-MCNC: 176 MG/DL (ref 65–140)

## 2024-10-04 PROCEDURE — G0121 COLON CA SCRN NOT HI RSK IND: HCPCS | Performed by: STUDENT IN AN ORGANIZED HEALTH CARE EDUCATION/TRAINING PROGRAM

## 2024-10-04 PROCEDURE — 82948 REAGENT STRIP/BLOOD GLUCOSE: CPT

## 2024-10-04 RX ORDER — LIDOCAINE HYDROCHLORIDE 10 MG/ML
INJECTION, SOLUTION EPIDURAL; INFILTRATION; INTRACAUDAL; PERINEURAL AS NEEDED
Status: DISCONTINUED | OUTPATIENT
Start: 2024-10-04 | End: 2024-10-04

## 2024-10-04 RX ORDER — PROPOFOL 10 MG/ML
INJECTION, EMULSION INTRAVENOUS AS NEEDED
Status: DISCONTINUED | OUTPATIENT
Start: 2024-10-04 | End: 2024-10-04

## 2024-10-04 RX ORDER — SODIUM CHLORIDE, SODIUM LACTATE, POTASSIUM CHLORIDE, CALCIUM CHLORIDE 600; 310; 30; 20 MG/100ML; MG/100ML; MG/100ML; MG/100ML
125 INJECTION, SOLUTION INTRAVENOUS CONTINUOUS
Status: DISCONTINUED | OUTPATIENT
Start: 2024-10-04 | End: 2024-10-08 | Stop reason: HOSPADM

## 2024-10-04 RX ORDER — ACETAMINOPHEN 325 MG/1
650 TABLET ORAL EVERY 6 HOURS PRN
COMMUNITY

## 2024-10-04 RX ADMIN — PROPOFOL 50 MG: 10 INJECTION, EMULSION INTRAVENOUS at 07:59

## 2024-10-04 RX ADMIN — PROPOFOL 100 MG: 10 INJECTION, EMULSION INTRAVENOUS at 07:54

## 2024-10-04 RX ADMIN — LIDOCAINE HYDROCHLORIDE 50 MG: 10 SOLUTION INTRAVENOUS at 07:54

## 2024-10-04 RX ADMIN — SODIUM CHLORIDE, SODIUM LACTATE, POTASSIUM CHLORIDE, AND CALCIUM CHLORIDE 125 ML/HR: .6; .31; .03; .02 INJECTION, SOLUTION INTRAVENOUS at 07:32

## 2024-10-04 RX ADMIN — PROPOFOL 50 MG: 10 INJECTION, EMULSION INTRAVENOUS at 07:56

## 2024-10-04 RX ADMIN — PROPOFOL 20 MG: 10 INJECTION, EMULSION INTRAVENOUS at 08:05

## 2024-10-04 RX ADMIN — PROPOFOL 20 MG: 10 INJECTION, EMULSION INTRAVENOUS at 08:09

## 2024-10-04 RX ADMIN — PROPOFOL 40 MG: 10 INJECTION, EMULSION INTRAVENOUS at 08:02

## 2024-10-04 NOTE — TELEPHONE ENCOUNTER
Procedure: Colonoscopy  Date: 01/07/2025  Physician performing: Dr. Dietrich  Location of procedure:    Instructions given to patient: Federico  Diabetic: Metformin  Clearances: N/A

## 2024-10-04 NOTE — ANESTHESIA PREPROCEDURE EVALUATION
Procedure:  COLONOSCOPY    Relevant Problems   CARDIO   (+) Hyperlipidemia   (+) Primary hypertension      ENDO   (+) Type 2 diabetes mellitus without complication, without long-term current use of insulin (HCC)      /RENAL   (+) Benign prostatic hyperplasia      MUSCULOSKELETAL   (+) Acute left-sided low back pain without sciatica      NEURO/PSYCH   (+) Chronic pain of both shoulders        Physical Exam    Airway    Mallampati score: II  TM Distance: >3 FB  Neck ROM: full     Dental       Cardiovascular      Pulmonary      Other Findings        Anesthesia Plan  ASA Score- 2     Anesthesia Type- IV sedation with anesthesia with ASA Monitors.         Additional Monitors:     Airway Plan:            Plan Factors-Exercise tolerance (METS): >4 METS.    Chart reviewed.        Patient is not a current smoker.  Patient did not smoke on day of surgery.    Obstructive sleep apnea risk education given perioperatively.        Induction- intravenous.    Postoperative Plan-     Perioperative Resuscitation Plan - Level 1 - Full Code.       Informed Consent- Anesthetic plan and risks discussed with patient.  I personally reviewed this patient with the CRNA. Discussed and agreed on the Anesthesia Plan with the CRNA..      NPO appropriate. Discussed benefits/risks of monitored anesthetic care and discussed providing a dynamic level of mild to deep sedation. Risks include awareness, airway obstruction, aspiration which may necessitate conversion to general anesthesia. All questions answered. Patient understands and wishes to proceed.    Anesthesia plan and consent discussed with Duncan who expressed understanding and agreement. Risks/benefits and alternatives discussed with patient including possible PONV, sore throat, damage to teeth/lips/gums and possibility of rare anesthetic and surgical emergencies.

## 2024-10-04 NOTE — TELEPHONE ENCOUNTER
I spoke to pt and schedule the colonoscopy and 3 months.  I review the golytely prep with the pt I also mailed the prep with diabetic instruction.

## 2024-10-04 NOTE — TELEPHONE ENCOUNTER
----- Message from Osvaldo Dietrich MD sent at 10/4/2024  8:28 AM EDT -----  Regarding: Repeat colon in 3 months  Hi,    This patient had a poor prep. Can he have a repeat colon in 3 months with Golytely bowel prep? Also, he ate food yesterday so he should have instructions reviewed.    He is [place]-speaking.    Joseph

## 2024-10-04 NOTE — ANESTHESIA POSTPROCEDURE EVALUATION
Post-Op Assessment Note    CV Status:  Stable    Pain management: satisfactory to patient       Mental Status:  Sleepy and arousable   Hydration Status:  Euvolemic   PONV Controlled:  Controlled   Airway Patency:  Patent     Post Op Vitals Reviewed: Yes    No anethesia notable event occurred.    Staff: Anesthesiologist, CRNA           /65 (10/04/24 0816)    Temp 97.7 °F (36.5 °C) (10/04/24 0816)    Pulse 80 (10/04/24 0816)   Resp 16 (10/04/24 0816)    SpO2 98 % (10/04/24 0816)

## 2024-10-08 NOTE — ANESTHESIA POSTPROCEDURE EVALUATION
Post-Op Assessment Note            No anethesia notable event occurred.    Staff: Anesthesiologist           Last Filed PACU Vitals:  Vitals Value Taken Time   Temp 97.7 °F (36.5 °C) 10/04/24 0816   Pulse 66 10/04/24 0831   /72 10/04/24 0831   Resp 18 10/04/24 0831   SpO2 97 % 10/04/24 0831       Modified [place]:  No data recorded

## 2024-10-21 ENCOUNTER — HOSPITAL ENCOUNTER (OUTPATIENT)
Dept: RADIOLOGY | Facility: HOSPITAL | Age: 69
Discharge: HOME | End: 2024-10-21
Payer: MEDICARE

## 2024-10-21 ENCOUNTER — OFFICE VISIT (OUTPATIENT)
Dept: FAMILY MEDICINE CLINIC | Facility: CLINIC | Age: 69
End: 2024-10-21

## 2024-10-21 VITALS
TEMPERATURE: 97.6 F | DIASTOLIC BLOOD PRESSURE: 60 MMHG | HEIGHT: 62 IN | BODY MASS INDEX: 28.26 KG/M2 | WEIGHT: 153.6 LBS | OXYGEN SATURATION: 95 % | HEART RATE: 89 BPM | RESPIRATION RATE: 16 BRPM | SYSTOLIC BLOOD PRESSURE: 130 MMHG

## 2024-10-21 DIAGNOSIS — M25.511 CHRONIC PAIN OF BOTH SHOULDERS: ICD-10-CM

## 2024-10-21 DIAGNOSIS — G89.29 CHRONIC PAIN OF BOTH SHOULDERS: ICD-10-CM

## 2024-10-21 DIAGNOSIS — E78.5 HYPERLIPIDEMIA, UNSPECIFIED HYPERLIPIDEMIA TYPE: ICD-10-CM

## 2024-10-21 DIAGNOSIS — Z86.73 HISTORY OF CVA (CEREBROVASCULAR ACCIDENT): ICD-10-CM

## 2024-10-21 DIAGNOSIS — G62.9 NEUROPATHY: ICD-10-CM

## 2024-10-21 DIAGNOSIS — N40.0 BENIGN PROSTATIC HYPERPLASIA, UNSPECIFIED WHETHER LOWER URINARY TRACT SYMPTOMS PRESENT: ICD-10-CM

## 2024-10-21 DIAGNOSIS — M54.50 ACUTE LEFT-SIDED LOW BACK PAIN WITHOUT SCIATICA: ICD-10-CM

## 2024-10-21 DIAGNOSIS — I10 PRIMARY HYPERTENSION: ICD-10-CM

## 2024-10-21 DIAGNOSIS — E11.9 TYPE 2 DIABETES MELLITUS WITHOUT COMPLICATION, WITHOUT LONG-TERM CURRENT USE OF INSULIN (HCC): ICD-10-CM

## 2024-10-21 DIAGNOSIS — M54.50 ACUTE LEFT-SIDED LOW BACK PAIN WITHOUT SCIATICA: Primary | ICD-10-CM

## 2024-10-21 DIAGNOSIS — M25.512 CHRONIC PAIN OF BOTH SHOULDERS: ICD-10-CM

## 2024-10-21 PROCEDURE — 72110 X-RAY EXAM L-2 SPINE 4/>VWS: CPT

## 2024-10-21 PROCEDURE — 99214 OFFICE O/P EST MOD 30 MIN: CPT | Performed by: PHYSICIAN ASSISTANT

## 2024-10-21 NOTE — ASSESSMENT & PLAN NOTE
- Continue metformin 1000 mg twice daily and Januvia 100 mg daily.  - Continue following diabetic diet with focus on low intake of carbohydrates and sugars.  - Patient requesting referral to podiatry.  Referral previously placed.  Advised to call to schedule an appointment.  - Patient unsure when last diabetic eye exam was.  Patient requesting to see an eye doctor.  Referral previously placed.  Advised to call to schedule an appointment.  -Reviewed albumin/creatinine urine ratio from September 2024.  Results are normal.    Lab Results   Component Value Date    HGBA1C 6.2 08/10/2024

## 2024-10-21 NOTE — ASSESSMENT & PLAN NOTE
- Much improved since starting gabapentin.  - Continue gabapentin 300 mg daily at bedtime.  -Reviewed CBC, CMP, vitamin B12 from September 2024.  Results are within normal range.

## 2024-10-21 NOTE — ASSESSMENT & PLAN NOTE
- Per patient, occurred around 2021. Residual right lower extremity weakness.   - Patient experiencing balance issues. Currently using a cane.   -Patient previously referred to physical therapy to help increase strength, mobility, function.  Advised to call to schedule an appointment to establish care.  - Continue statin.

## 2024-10-21 NOTE — ASSESSMENT & PLAN NOTE
-Stable.  Continue flexeril, BID PRN.   -Advised to apply ice/heating pad/topical therapies as needed to affected area.  - Provided patient with list of exercises to perform daily.  - If symptoms persist/worsen, would recommend referral to physical therapy.

## 2024-10-21 NOTE — PATIENT INSTRUCTIONS
1.) Recommend stopping flomax for one week and see how your dry mouth symptoms are without it.   2.) Please complete your back x-ray at earliest convenience. Thanks!

## 2024-10-21 NOTE — ASSESSMENT & PLAN NOTE
- Tamsulosin 0.4 mg daily restarted at last visit, however patient is now complaining of dry mouth side effects.  Explained to patient this could be secondary to tamsulosin use.  - Recommend patient trial 1 week without tamsulosin and see if his dry mouth symptoms improved.  If they do, could consider replacing tamsulosin with alternative medication.

## 2024-10-21 NOTE — ASSESSMENT & PLAN NOTE
-Continue flexeril, BID PRN.  Discussed possible side effects of drowsiness and advised to avoid driving or operating heavy machinery when taking medication.  -Will obtain lumbar spine x-ray for further evaluation.  -Advised to apply ice/heating pad/topical therapies as needed to affected area.  - Provided patient with list of exercises to perform daily.  - If symptoms persist/worsen, would recommend referral to physical therapy.     Orders:    XR spine lumbar minimum 4 views non injury; Future

## 2024-10-21 NOTE — PROGRESS NOTES
Ambulatory Visit  Name: Duncan Sánchez      : 1955      MRN: [id number]  Encounter Provider: Mena Alexis PA-C  Encounter Date: 10/21/2024   Encounter department: [facility]    Assessment & Plan  Acute left-sided low back pain without sciatica  -Continue flexeril, BID PRN.  Discussed possible side effects of drowsiness and advised to avoid driving or operating heavy machinery when taking medication.  -Will obtain lumbar spine x-ray for further evaluation.  -Advised to apply ice/heating pad/topical therapies as needed to affected area.  - Provided patient with list of exercises to perform daily.  - If symptoms persist/worsen, would recommend referral to physical therapy.     Orders:    XR spine lumbar minimum 4 views non injury; Future    Neuropathy  - Much improved since starting gabapentin.  - Continue gabapentin 300 mg daily at bedtime.  -Reviewed CBC, CMP, vitamin B12 from 2024.  Results are within normal range.       Hyperlipidemia, unspecified hyperlipidemia type  - Continue simvastatin 40 mg daily.  -Reviewed lipid panel from 2024.         History of CVA (cerebrovascular accident)  - Per patient, occurred around . Residual right lower extremity weakness.   - Patient experiencing balance issues. Currently using a cane.   -Patient previously referred to physical therapy to help increase strength, mobility, function.  Advised to call to schedule an appointment to establish care.  - Continue statin.          Chronic pain of both shoulders  -Stable.  Continue flexeril, BID PRN.   -Advised to apply ice/heating pad/topical therapies as needed to affected area.  - Provided patient with list of exercises to perform daily.  - If symptoms persist/worsen, would recommend referral to physical therapy.         Primary hypertension  - Continue losartan 50 mg once daily.   - Currently blood pressure is controlled at this time.  Reviewed BP target goal  with patient.    - Continue to maintain healthy balanced diet with focus on low salt intake. Limit alcohol intake.   - Advised to exercise at least 30 minutes a day for at least 5 days out of the week.          Type 2 diabetes mellitus without complication, without long-term current use of insulin ([facility])  - Continue metformin 1000 mg twice daily and Januvia 100 mg daily.  - Continue following diabetic diet with focus on low intake of carbohydrates and sugars.  - Patient requesting referral to podiatry.  Referral previously placed.  Advised to call to schedule an appointment.  - Patient unsure when last diabetic eye exam was.  Patient requesting to see an eye doctor.  Referral previously placed.  Advised to call to schedule an appointment.  -Reviewed albumin/creatinine urine ratio from September 2024.  Results are normal.    Lab Results   Component Value Date    HGBA1C 6.2 08/10/2024            Benign prostatic hyperplasia, unspecified whether lower urinary tract symptoms present  - Tamsulosin 0.4 mg daily restarted at last visit, however patient is now complaining of dry mouth side effects.  Explained to patient this could be secondary to tamsulosin use.  - Recommend patient trial 1 week without tamsulosin and see if his dry mouth symptoms improved.  If they do, could consider replacing tamsulosin with alternative medication.            History of Present Illness       Patient is a 69 y.o. male whom  has a past medical history of Benign prostatic hyperplasia (08/10/2024), History of CVA (cerebrovascular accident) (08/06/2024), Hyperlipidemia (08/06/2024), Neuropathy (09/23/2024), Primary hypertension (08/06/2024), and Type 2 diabetes mellitus without complication, without long-term current use of insulin (HCC) (08/06/2024). who is seen today in office for back pain follow up.    -Patient notes he did complete the colonoscopy, but he had poor prep so he has to repeat it in January.  Patient notes he does continue with  left lower back pain when bending or standing up.  Patient is requesting an x-ray to check this out further.  Patient notes he has been taking the Flexeril as needed and it helps him to sleep.    -Patient notes the pinching sensation, numbness, tingling sensation of bilateral lower legs and feet has much improved since he started the gabapentin.    -Patient notes he would still like to follow-up with physical therapy to help with his balance, but he has not had time to make an appointment as of yet.  Patient notes recently he has been experiencing dry mouth.  Patient notes he has had this previously and it did get very bad where his tongue was very dry and he was having difficulty speaking.  Patient notes he is also experiencing some burning sensation of his tongue.      History obtained from : patient  Review of Systems   Constitutional:  Negative for chills and fever.   HENT:  Negative for congestion and sore throat.         Dry mouth, burning sensation of tongue   Eyes:  Negative for pain and visual disturbance.   Respiratory:  Negative for cough, chest tightness and shortness of breath.    Cardiovascular:  Negative for chest pain, palpitations and leg swelling.   Gastrointestinal:  Positive for constipation (occasionally, controlled with home remedies). Negative for abdominal pain, diarrhea, nausea and vomiting.   Genitourinary:  Negative for difficulty urinating, dysuria and frequency.   Musculoskeletal:  Positive for arthralgias, back pain and gait problem.   Skin:  Negative for rash.   Neurological:  Positive for numbness. Negative for dizziness and headaches.        Balance issues     Pertinent Medical History     Medical History Reviewed by provider this encounter:  Tobacco  Allergies  Meds  Problems  Med Hx  Surg Hx  Fam Hx       Past Medical History   Past Medical History:   Diagnosis Date    Benign prostatic hyperplasia 08/10/2024    History of CVA (cerebrovascular accident) 08/06/2024     Hyperlipidemia 08/06/2024    Neuropathy 09/23/2024    Primary hypertension 08/06/2024    Type 2 diabetes mellitus without complication, without long-term current use of insulin (HCC) 08/06/2024     History reviewed. No pertinent surgical history.  History reviewed. No pertinent family history.  Current Outpatient Medications on File Prior to Visit   Medication Sig Dispense Refill    acetaminophen (TYLENOL) 325 mg tablet Take 650 mg by mouth every 6 (six) hours as needed for mild pain      cyclobenzaprine (FLEXERIL) 10 mg tablet Take 1 tablet (10 mg total) by mouth 2 (two) times a day as needed for muscle spasms 60 tablet 1    gabapentin (Neurontin) 300 mg capsule Take 1 capsule (300 mg total) by mouth daily at bedtime 90 capsule 1    losartan (COZAAR) 50 mg tablet Take 1 tablet (50 mg total) by mouth daily 90 tablet 1    metFORMIN (GLUCOPHAGE) 1000 MG tablet Take 1 tablet (1,000 mg total) by mouth 2 (two) times a day with meals 180 tablet 1    simvastatin (ZOCOR) 40 mg tablet Take 1 tablet (40 mg total) by mouth every evening 90 tablet 1    sitaGLIPtin (Januvia) 100 mg tablet Take 1 tablet (100 mg total) by mouth daily 90 tablet 1    tamsulosin (FLOMAX) 0.4 mg Take 1 capsule (0.4 mg total) by mouth daily 90 capsule 1     No current facility-administered medications on file prior to visit.   No Known Allergies   Current Outpatient Medications on File Prior to Visit   Medication Sig Dispense Refill    acetaminophen (TYLENOL) 325 mg tablet Take 650 mg by mouth every 6 (six) hours as needed for mild pain      cyclobenzaprine (FLEXERIL) 10 mg tablet Take 1 tablet (10 mg total) by mouth 2 (two) times a day as needed for muscle spasms 60 tablet 1    gabapentin (Neurontin) 300 mg capsule Take 1 capsule (300 mg total) by mouth daily at bedtime 90 capsule 1    losartan (COZAAR) 50 mg tablet Take 1 tablet (50 mg total) by mouth daily 90 tablet 1    metFORMIN (GLUCOPHAGE) 1000 MG tablet Take 1 tablet (1,000 mg total) by mouth 2  "(two) times a day with meals 180 tablet 1    simvastatin (ZOCOR) 40 mg tablet Take 1 tablet (40 mg total) by mouth every evening 90 tablet 1    sitaGLIPtin (Januvia) 100 mg tablet Take 1 tablet (100 mg total) by mouth daily 90 tablet 1    tamsulosin (FLOMAX) 0.4 mg Take 1 capsule (0.4 mg total) by mouth daily 90 capsule 1     No current facility-administered medications on file prior to visit.      Social History     Tobacco Use    Smoking status: Never    Smokeless tobacco: Never   Vaping Use    Vaping status: Never Used   Substance and Sexual Activity    Alcohol use: Yes     Comment: socially    Drug use: Never    Sexual activity: Not on file         Objective     /60 (BP Location: Left arm, Patient Position: Sitting, Cuff Size: Standard)   Pulse 89   Temp 97.6 °F (36.4 °C) (Temporal)   Resp 16   Ht 5' 1.81\" (1.57 m)   Wt 69.7 kg (153 lb 9.6 oz)   SpO2 95%   BMI 28.27 kg/m²     Physical Exam  Vitals and nursing note reviewed.   Constitutional:       General: He is not in acute distress.     Appearance: Normal appearance. He is well-developed.      Comments: Uses cane with ambulation   HENT:      Head: Normocephalic and atraumatic.      Right Ear: External ear normal.      Left Ear: External ear normal.      Nose: Nose normal.      Mouth/Throat:      Pharynx: Uvula midline.   Eyes:      Conjunctiva/sclera: Conjunctivae normal.   Cardiovascular:      Rate and Rhythm: Normal rate and regular rhythm.      Pulses: Normal pulses.      Heart sounds: Normal heart sounds. No murmur heard.  Pulmonary:      Effort: Pulmonary effort is normal. No respiratory distress.      Breath sounds: Normal breath sounds. No wheezing.   Musculoskeletal:      Cervical back: Normal range of motion and neck supple.      Lumbar back: Tenderness (left paralumbar area) present. No swelling. Decreased range of motion. Negative right straight leg raise test and negative left straight leg raise test.   Skin:     General: Skin is warm " and dry.      Capillary Refill: Capillary refill takes less than 2 seconds.   Neurological:      Mental Status: He is alert and oriented to person, place, and time.      Gait: Gait abnormal.   Psychiatric:         Speech: Speech normal.         Behavior: Behavior normal.           - Utilized [employer] services for translation.

## 2024-12-31 ENCOUNTER — TELEPHONE (OUTPATIENT)
Dept: FAMILY MEDICINE CLINIC | Facility: CLINIC | Age: 69
End: 2024-12-31

## 2024-12-31 DIAGNOSIS — E11.9 TYPE 2 DIABETES MELLITUS WITHOUT COMPLICATION, WITHOUT LONG-TERM CURRENT USE OF INSULIN (HCC): ICD-10-CM

## 2024-12-31 DIAGNOSIS — I10 PRIMARY HYPERTENSION: ICD-10-CM

## 2024-12-31 RX ORDER — LOSARTAN POTASSIUM 50 MG/1
50 TABLET ORAL DAILY
Qty: 90 TABLET | Refills: 1 | Status: SHIPPED | OUTPATIENT
Start: 2024-12-31 | End: 2025-01-08 | Stop reason: SDUPTHER

## 2024-12-31 NOTE — TELEPHONE ENCOUNTER
Good afternoon,    Pt is requesting a refill for medication:    losartan (COZAAR) 50 mg tablet [[id number]]     sitaGLIPtin (Januvia) 100 mg tablet [[id number]]

## 2025-01-08 ENCOUNTER — OFFICE VISIT (OUTPATIENT)
Dept: FAMILY MEDICINE CLINIC | Facility: CLINIC | Age: 70
End: 2025-01-08

## 2025-01-08 VITALS
RESPIRATION RATE: 18 BRPM | DIASTOLIC BLOOD PRESSURE: 80 MMHG | BODY MASS INDEX: 28.89 KG/M2 | HEIGHT: 61 IN | SYSTOLIC BLOOD PRESSURE: 150 MMHG | OXYGEN SATURATION: 98 % | TEMPERATURE: 98.3 F | HEART RATE: 95 BPM | WEIGHT: 153 LBS

## 2025-01-08 DIAGNOSIS — G62.9 NEUROPATHY: ICD-10-CM

## 2025-01-08 DIAGNOSIS — M54.41 CHRONIC BILATERAL LOW BACK PAIN WITH BILATERAL SCIATICA: ICD-10-CM

## 2025-01-08 DIAGNOSIS — M54.42 CHRONIC BILATERAL LOW BACK PAIN WITH BILATERAL SCIATICA: ICD-10-CM

## 2025-01-08 DIAGNOSIS — M79.671 BILATERAL FOOT PAIN: ICD-10-CM

## 2025-01-08 DIAGNOSIS — M54.50 ACUTE LEFT-SIDED LOW BACK PAIN WITHOUT SCIATICA: ICD-10-CM

## 2025-01-08 DIAGNOSIS — N40.0 BENIGN PROSTATIC HYPERPLASIA, UNSPECIFIED WHETHER LOWER URINARY TRACT SYMPTOMS PRESENT: ICD-10-CM

## 2025-01-08 DIAGNOSIS — M65.311 TRIGGER FINGER OF RIGHT THUMB: ICD-10-CM

## 2025-01-08 DIAGNOSIS — E78.5 HYPERLIPIDEMIA, UNSPECIFIED HYPERLIPIDEMIA TYPE: ICD-10-CM

## 2025-01-08 DIAGNOSIS — M79.672 BILATERAL FOOT PAIN: ICD-10-CM

## 2025-01-08 DIAGNOSIS — Z86.73 HISTORY OF CVA (CEREBROVASCULAR ACCIDENT): ICD-10-CM

## 2025-01-08 DIAGNOSIS — G89.29 CHRONIC PAIN OF BOTH SHOULDERS: ICD-10-CM

## 2025-01-08 DIAGNOSIS — G89.29 CHRONIC BILATERAL LOW BACK PAIN WITH BILATERAL SCIATICA: ICD-10-CM

## 2025-01-08 DIAGNOSIS — I10 PRIMARY HYPERTENSION: ICD-10-CM

## 2025-01-08 DIAGNOSIS — Z87.81 HISTORY OF ANKLE FRACTURE: ICD-10-CM

## 2025-01-08 DIAGNOSIS — M25.512 CHRONIC PAIN OF BOTH SHOULDERS: ICD-10-CM

## 2025-01-08 DIAGNOSIS — Z00.00 MEDICARE ANNUAL WELLNESS VISIT, SUBSEQUENT: Primary | ICD-10-CM

## 2025-01-08 DIAGNOSIS — E11.9 TYPE 2 DIABETES MELLITUS WITHOUT COMPLICATION, WITHOUT LONG-TERM CURRENT USE OF INSULIN (HCC): ICD-10-CM

## 2025-01-08 DIAGNOSIS — H91.93 DECREASED HEARING OF BOTH EARS: ICD-10-CM

## 2025-01-08 DIAGNOSIS — M25.511 CHRONIC PAIN OF BOTH SHOULDERS: ICD-10-CM

## 2025-01-08 LAB — SL AMB POCT HEMOGLOBIN AIC: 7.4 (ref ?–6.5)

## 2025-01-08 PROCEDURE — 3077F SYST BP >= 140 MM HG: CPT | Performed by: PHYSICIAN ASSISTANT

## 2025-01-08 PROCEDURE — 83036 HEMOGLOBIN GLYCOSYLATED A1C: CPT | Performed by: PHYSICIAN ASSISTANT

## 2025-01-08 PROCEDURE — 99215 OFFICE O/P EST HI 40 MIN: CPT | Performed by: PHYSICIAN ASSISTANT

## 2025-01-08 PROCEDURE — 3079F DIAST BP 80-89 MM HG: CPT | Performed by: PHYSICIAN ASSISTANT

## 2025-01-08 PROCEDURE — G0439 PPPS, SUBSEQ VISIT: HCPCS | Performed by: PHYSICIAN ASSISTANT

## 2025-01-08 RX ORDER — SIMVASTATIN 40 MG
40 TABLET ORAL EVERY EVENING
Qty: 90 TABLET | Refills: 1 | Status: SHIPPED | OUTPATIENT
Start: 2025-01-08

## 2025-01-08 RX ORDER — CYCLOBENZAPRINE HCL 10 MG
10 TABLET ORAL 2 TIMES DAILY PRN
Qty: 60 TABLET | Refills: 2 | Status: SHIPPED | OUTPATIENT
Start: 2025-01-08

## 2025-01-08 RX ORDER — LOSARTAN POTASSIUM 50 MG/1
50 TABLET ORAL DAILY
Qty: 90 TABLET | Refills: 1 | Status: SHIPPED | OUTPATIENT
Start: 2025-01-08

## 2025-01-08 RX ORDER — GABAPENTIN 300 MG/1
300 CAPSULE ORAL 2 TIMES DAILY
Qty: 180 CAPSULE | Refills: 1 | Status: SHIPPED | OUTPATIENT
Start: 2025-01-08

## 2025-01-08 RX ORDER — TAMSULOSIN HYDROCHLORIDE 0.4 MG/1
0.4 CAPSULE ORAL DAILY
Qty: 90 CAPSULE | Refills: 1 | Status: SHIPPED | OUTPATIENT
Start: 2025-01-08

## 2025-01-08 NOTE — PROGRESS NOTES
Name: Duncan Sánchez      : 1955      MRN: [id number]  Encounter Provider: Mena Alexis PA-C  Encounter Date: 2025   Encounter department: [facility]    Assessment & Plan  Medicare annual wellness visit, subsequent         Type 2 diabetes mellitus without complication, without long-term current use of insulin (HCC)  -POCT hemoglobin A1c 7.4.  This has increased from 6.2 in 2024.  - Continue metformin 1000 mg twice daily and Januvia 100 mg daily.  - Continue following diabetic diet with focus on low intake of carbohydrates and sugars.  - Patient requesting referral to podiatry.  Referral previously placed.  Advised to call to schedule an appointment.  - Patient unsure when last diabetic eye exam was.  Patient requesting to see an eye doctor.  Referral previously placed.  Advised to call to schedule an appointment.  -Reviewed albumin/creatinine urine ratio from 2024.  Results are normal.  Lab Results   Component Value Date    HGBA1C 7.4 (A) 2025    HGBA1C 6.2 08/10/2024       Orders:    POCT hemoglobin A1c    Ambulatory Referral to Podiatry; Future    metFORMIN (GLUCOPHAGE) 1000 MG tablet; Take 1 tablet (1,000 mg total) by mouth 2 (two) times a day with meals    sitaGLIPtin (Januvia) 100 mg tablet; Take 1 tablet (100 mg total) by mouth daily    Neuropathy  -Will increase gabapentin to 300 mg twice daily.  Orders:    gabapentin (Neurontin) 300 mg capsule; Take 1 capsule (300 mg total) by mouth 2 (two) times a day    Ambulatory Referral to Podiatry; Future    Chronic bilateral low back pain with bilateral sciatica  -Reviewed lumbar spine x-ray from 10/21/2024.  Results show mild chronic appearing anterior wedge deformity of the L1 vertebral body.  Mild multilevel spondylotic degenerative changes of the lumbar spine.  - Will refer to orthopedics for further evaluation and management.  -Will start Flexeril 10 mg, twice daily as needed.  -Advised  to apply ice/heating pad/topical therapies as needed to affected area.  - Provided patient with list of exercises to perform daily.  - If symptoms persist/worsen, would recommend referral to physical therapy.  Orders:    Ambulatory Referral to Orthopedic Surgery; Future    Bilateral foot pain  -Will refer to podiatry for further evaluation and management.  Orders:    Ambulatory Referral to Podiatry; Future    History of ankle fracture  -Will refer to podiatry for further evaluation and management.  Orders:    Ambulatory Referral to Podiatry; Future    Decreased hearing of both ears  -Will refer to audiology for comprehensive hearing exam.  Orders:    Ambulatory Referral to Audiology; Future    Trigger finger of right thumb  -Will refer to hand specialist for further evaluation and management.  Orders:    Ambulatory Referral to Orthopedic Surgery; Future    Primary hypertension  - Continue losartan 50 mg once daily.   - Currently blood pressure is controlled at this time.  Reviewed BP target goal with patient.    - Continue to maintain healthy balanced diet with focus on low salt intake. Limit alcohol intake.   - Advised to exercise at least 30 minutes a day for at least 5 days out of the week.     Orders:    losartan (COZAAR) 50 mg tablet; Take 1 tablet (50 mg total) by mouth daily      Hyperlipidemia, unspecified hyperlipidemia type  - Continue simvastatin 40 mg daily.  -Reviewed lipid panel from September 2024.    Orders:    simvastatin (ZOCOR) 40 mg tablet; Take 1 tablet (40 mg total) by mouth every evening      Benign prostatic hyperplasia, unspecified whether lower urinary tract symptoms present  - Tamsulosin 0.4 mg daily restarted at last visit, however patient is now complaining of dry mouth side effects.  Explained to patient this could be secondary to tamsulosin use.  - Recommend patient trial 1 week without tamsulosin and see if his dry mouth symptoms improved.  If they do, could consider replacing  tamsulosin with alternative medication.    Orders:    tamsulosin (FLOMAX) 0.4 mg; Take 1 capsule (0.4 mg total) by mouth daily      Chronic pain of both shoulders    Orders:    cyclobenzaprine (FLEXERIL) 10 mg tablet; Take 1 tablet (10 mg total) by mouth 2 (two) times a day as needed for muscle spasms    Acute left-sided low back pain without sciatica    Orders:    cyclobenzaprine (FLEXERIL) 10 mg tablet; Take 1 tablet (10 mg total) by mouth 2 (two) times a day as needed for muscle spasms    History of CVA (cerebrovascular accident)  - Per patient, occurred around 2021. Residual right lower extremity weakness.   - Patient experiencing balance issues. Currently using a cane.   -Patient previously referred to physical therapy to help increase strength, mobility, function.  Advised to call to schedule an appointment to establish care.  - Continue statin.            BMI Counseling: Body mass index is 28.91 kg/m². The BMI is above normal. Nutrition recommendations include decreasing portion sizes, encouraging healthy choices of fruits and vegetables, consuming healthier snacks, limiting drinks that contain sugar, moderation in carbohydrate intake, increasing intake of lean protein and reducing intake of cholesterol. Exercise recommendations include moderate physical activity 150 minutes/week. No pharmacotherapy was ordered. Rationale for BMI follow-up plan is due to patient being overweight or obese.     Falls Plan of Care: balance, strength, and gait training instructions were provided and referral to physical therapy. Recommended assistive device to help with gait and balance. Assessed feet and footwear. Assessed visual acuity.       Preventive health issues were discussed with patient, and age appropriate screening tests were ordered as noted in patient's After Visit Summary. Personalized health advice and appropriate referrals for health education or preventive services given if needed, as noted in patient's After  "Visit Summary.    History of Present Illness       Patient is a 69 y.o. male whom  has a past medical history of Benign prostatic hyperplasia (08/10/2024), History of CVA (cerebrovascular accident) (08/06/2024), Hyperlipidemia (08/06/2024), Neuropathy (09/23/2024), Primary hypertension (08/06/2024), and Type 2 diabetes mellitus without complication, without long-term current use of insulin (HCC) (08/06/2024). who is seen today in office for AWV.  Patient is accompanied today by his caretaker.    -Patient notes he has difficulty hearing bilaterally.  He was never provided hearing aids before, but he believes he may need them.  Patient notes he continues with pain located on the soles of bilateral feet.  Patient notes he did see a podiatrist about 2 months ago, but they only clipped his toenails.  He would like to see a different podiatrist.    -Patient notes he continues with lower back pain with associated pain, numbness, tingling down bilateral legs.  Patient notes the gabapentin has been helping, but he has only been taking it at night because it does make him sleepy.  Patient notes he has never seen a doctor for his back before denies ever receiving any injections.    -Patient notes recently his right thumb has been \"catching on itself\" and has been very bothersome.  He reports some associated swelling.  He denies any known injury or trauma to the area.  He denies any symptoms of the other fingers.    Patient Care Team:  Mena Alexis PA-C as PCP - General (Family Medicine)    Review of Systems   Constitutional:  Negative for chills and fever.   HENT:  Negative for congestion and sore throat.    Eyes:  Negative for pain and visual disturbance.   Respiratory:  Negative for cough, chest tightness and shortness of breath.    Cardiovascular:  Negative for chest pain, palpitations and leg swelling.   Gastrointestinal:  Positive for constipation (occasionally, controlled with home remedies). Negative for abdominal " pain, diarrhea, nausea and vomiting.   Genitourinary:  Negative for difficulty urinating, dysuria and frequency.   Musculoskeletal:  Positive for arthralgias, back pain and gait problem.   Skin:  Negative for rash.   Neurological:  Positive for numbness. Negative for dizziness and headaches.        Balance issues     Medical History Reviewed by provider this encounter:  Tobacco  Allergies  Meds  Problems  Med Hx  Surg Hx  Fam Hx       Annual Wellness Visit Questionnaire   Duncan is here for his Initial Wellness visit.     Health Risk Assessment:   Patient rates overall health as poor. Patient feels that their physical health rating is much worse. Patient is satisfied with their life. Eyesight was rated as slightly worse. Hearing was rated as much worse. Patient feels that their emotional and mental health rating is slightly worse. Patients states they are sometimes angry. Patient states they are often unusually tired/fatigued. Pain experienced in the last 7 days has been a lot. Patient's pain rating has been 10/10. Patient states that he has experienced no weight loss or gain in last 6 months.     Fall Risk Screening:   In the past year, patient has experienced: history of falling in past year    Number of falls: 2 or more  Injured during fall?: Yes    Feels unsteady when standing or walking?: Yes    Worried about falling?: Yes      Home Safety:  Patient has trouble with stairs inside or outside of their home. Patient has working smoke alarms and has working carbon monoxide detector. Home safety hazards include: none.     Nutrition:   Current diet is Regular.     Medications:   Patient is currently taking over-the-counter supplements. OTC medications include: see medication list. Patient is able to manage medications.     Activities of Daily Living (ADLs)/Instrumental Activities of Daily Living (IADLs):   Walk and transfer into and out of bed and chair?: No  Dress and groom yourself?: No    Bathe or shower  yourself?: No    Feed yourself? Yes  Do your laundry/housekeeping?: No  Manage your money, pay your bills and track your expenses?: No  Make your own meals?: No    Do your own shopping?: No    Previous Hospitalizations:   Any hospitalizations or ED visits within the last 12 months?: No      Advance Care Planning:   Living will: No    Durable POA for healthcare: No    Advanced directive: No    ACP document given: Yes      Cognitive Screening:   Provider or family/friend/caregiver concerned regarding cognition?: No    PREVENTIVE SCREENINGS      Cardiovascular Screening:    General: Screening Not Indicated and History Lipid Disorder      Diabetes Screening:     General: Screening Not Indicated and History Diabetes      Colorectal Cancer Screening:     General: Screening Current      Prostate Cancer Screening:    General: Risks and Benefits Discussed      Osteoporosis Screening:    General: Risks and Benefits Discussed      Abdominal Aortic Aneurysm (AAA) Screening:    Risk factors include: age between 65-76 yo        General: Risks and Benefits Discussed      Lung Cancer Screening:     General: Screening Not Indicated      Hepatitis C Screening:    General: Risks and Benefits Discussed    Screening, Brief Intervention, and Referral to Treatment (SBIRT)    Screening  Typical number of drinks in a day: 0  Typical number of drinks in a week: 0  Interpretation: Low risk drinking behavior.    Single Item Drug Screening:  How often have you used an illegal drug (including marijuana) or a prescription medication for non-medical reasons in the past year? never    Single Item Drug Screen Score: 0  Interpretation: Negative screen for possible drug use disorder    Brief Intervention  Alcohol & drug use screenings were reviewed. No concerns regarding substance use disorder identified.     Other Counseling Topics:   Car/seat belt/driving safety, skin self-exam, sunscreen and regular weightbearing exercise and calcium and vitamin D  "intake.     Social Drivers of Health     Financial Resource Strain: Low Risk  (1/8/2025)    Overall Financial Resource Strain (CARDIA)     Difficulty of Paying Living Expenses: Not hard at all   Food Insecurity: No Food Insecurity (1/8/2025)    Hunger Vital Sign     Worried About Running Out of Food in the Last Year: Never true     Ran Out of Food in the Last Year: Never true   Transportation Needs: No Transportation Needs (1/8/2025)    PRAPARE - Transportation     Lack of Transportation (Medical): No     Lack of Transportation (Non-Medical): No   Housing Stability: Low Risk  (1/8/2025)    Housing Stability Vital Sign     Unable to Pay for Housing in the Last Year: No     Number of Times Moved in the Last Year: 0     Homeless in the Last Year: No     No results found.    Objective   /80 (BP Location: Right arm, Patient Position: Sitting, Cuff Size: Standard)   Pulse 95   Temp 98.3 °F (36.8 °C) (Temporal)   Resp 18   Ht 5' 1\" (1.549 m)   Wt 69.4 kg (153 lb)   SpO2 98%   BMI 28.91 kg/m²     Physical Exam  Vitals and nursing note reviewed.   Constitutional:       General: He is not in acute distress.     Appearance: Normal appearance. He is well-developed.      Comments: Uses cane with ambulation   HENT:      Head: Normocephalic and atraumatic.      Right Ear: External ear normal.      Left Ear: External ear normal.      Nose: Nose normal.      Mouth/Throat:      Pharynx: Uvula midline. No posterior oropharyngeal erythema.   Eyes:      Conjunctiva/sclera: Conjunctivae normal.   Cardiovascular:      Rate and Rhythm: Normal rate and regular rhythm.      Pulses: Normal pulses.      Heart sounds: Normal heart sounds. No murmur heard.  Pulmonary:      Effort: Pulmonary effort is normal. No respiratory distress.      Breath sounds: Normal breath sounds. No wheezing.   Abdominal:      General: Bowel sounds are normal.      Palpations: Abdomen is soft.      Tenderness: There is no abdominal tenderness. "   Musculoskeletal:      Cervical back: Normal range of motion and neck supple.      Lumbar back: Tenderness (left paralumbar area) present. No swelling. Decreased range of motion. Negative right straight leg raise test and negative left straight leg raise test.   Skin:     General: Skin is warm and dry.      Capillary Refill: Capillary refill takes less than 2 seconds.   Neurological:      Mental Status: He is alert and oriented to person, place, and time.      Gait: Gait abnormal.   Psychiatric:         Speech: Speech normal.         Behavior: Behavior normal.       Administrative Statements   I have spent a total time of 40 minutes in caring for this patient on the day of the visit/encounter including Prognosis, Instructions for management, Patient and family education, Importance of tx compliance, Risk factor reductions, Counseling / Coordination of care, Documenting in the medical record, Reviewing / ordering tests, medicine, procedures  , and Obtaining or reviewing history  .

## 2025-01-08 NOTE — PATIENT INSTRUCTIONS
[facility]- Back Doctor & Hand Doctor   Please contact us at [phone] to schedule your appointment.    Comprehensive Hearing Test  Please call central scheduling at [phone] to schedule. Thanks!     [facility]  [phone]    [facility]  [phone]    Medicare Preventive Visit Patient Instructions  Thank you for completing your Welcome to Medicare Visit or Medicare Annual Wellness Visit today. Your next wellness visit will be due in one year (1/9/2026).  The screening/preventive services that you may require over the next 5-10 years are detailed below. Some tests may not apply to you based off risk factors and/or age. Screening tests ordered at today's visit but not completed yet may show as past due. Also, please note that scanned in results may not display below.  Preventive Screenings:  Service Recommendations Previous Testing/Comments   Colorectal Cancer Screening  Colonoscopy    Fecal Occult Blood Test (FOBT)/Fecal Immunochemical Test (FIT)  Fecal DNA/Cologuard Test  Flexible Sigmoidoscopy Age: 45-75 years old   Colonoscopy: every 10 years (May be performed more frequently if at higher risk)  OR  FOBT/FIT: every 1 year  OR  Cologuard: every 3 years  OR  Sigmoidoscopy: every 5 years  Screening may be recommended earlier than age 45 if at higher risk for colorectal cancer. Also, an individualized decision between you and your healthcare provider will decide whether screening between the ages of 76-85 would be appropriate. Colonoscopy: 10/04/2024  FOBT/FIT: Not on file  Cologuard: Not on file  Sigmoidoscopy: Not on file    Screening Current     Prostate Cancer Screening Individualized decision between patient and health care provider in men between ages of 55-69   Medicare will cover every 12 months beginning on the day after your 50th birthday PSA: No results in last 5 years           Hepatitis C Screening Once for adults born between 1945 and 1965  More frequently in  patients at high risk for Hepatitis C Hep C Antibody: Not on file        Diabetes Screening 1-2 times per year if you're at risk for diabetes or have pre-diabetes Fasting glucose: 146 mg/dL (9/18/2024)  A1C: 6.2 (8/10/2024)  Screening Not Indicated  History Diabetes   Cholesterol Screening Once every 5 years if you don't have a lipid disorder. May order more often based on risk factors. Lipid panel: 09/18/2024  Screening Not Indicated  History Lipid Disorder      Other Preventive Screenings Covered by Medicare:  Abdominal Aortic Aneurysm (AAA) Screening: covered once if your at risk. You're considered to be at risk if you have a family history of AAA or a male between the age of 65-75 who smoking at least 100 cigarettes in your lifetime.  Lung Cancer Screening: covers low dose CT scan once per year if you meet all of the following conditions: (1) Age 55-77; (2) No signs or symptoms of lung cancer; (3) Current smoker or have quit smoking within the last 15 years; (4) You have a tobacco smoking history of at least 20 pack years (packs per day x number of years you smoked); (5) You get a written order from a healthcare provider.  Glaucoma Screening: covered annually if you're considered high risk: (1) You have diabetes OR (2) Family history of glaucoma OR (3)  aged 50 and older OR (4)  American aged 65 and older  Osteoporosis Screening: covered every 2 years if you meet one of the following conditions: (1) Have a vertebral abnormality; (2) On glucocorticoid therapy for more than 3 months; (3) Have primary hyperparathyroidism; (4) On osteoporosis medications and need to assess response to drug therapy.  HIV Screening: covered annually if you're between the age of 15-65. Also covered annually if you are younger than 15 and older than 65 with risk factors for HIV infection. For pregnant patients, it is covered up to 3 times per pregnancy.    Immunizations:  Immunization Recommendations   Influenza  Vaccine Annual influenza vaccination during flu season is recommended for all persons aged >= 6 months who do not have contraindications   Pneumococcal Vaccine   * Pneumococcal conjugate vaccine = PCV13 (Prevnar 13), PCV15 (Vaxneuvance), PCV20 (Prevnar 20)  * Pneumococcal polysaccharide vaccine = PPSV23 (Pneumovax) Adults 19-63 yo with certain risk factors or if 65+ yo  If never received any pneumonia vaccine: recommend Prevnar 20 (PCV20)  Give PCV20 if previously received 1 dose of PCV13 or PPSV23   Hepatitis B Vaccine 3 dose series if at intermediate or high risk (ex: diabetes, end stage renal disease, liver disease)   Respiratory syncytial virus (RSV) Vaccine - COVERED BY MEDICARE PART D  * RSVPreF3 (Arexvy) CDC recommends that adults 60 years of age and older may receive a single dose of RSV vaccine using shared clinical decision-making (SCDM)   Tetanus (Td) Vaccine - COST NOT COVERED BY MEDICARE PART B Following completion of primary series, a booster dose should be given every 10 years to maintain immunity against tetanus. Td may also be given as tetanus wound prophylaxis.   Tdap Vaccine - COST NOT COVERED BY MEDICARE PART B Recommended at least once for all adults. For pregnant patients, recommended with each pregnancy.   Shingles Vaccine (Shingrix) - COST NOT COVERED BY MEDICARE PART B  2 shot series recommended in those 19 years and older who have or will have weakened immune systems or those 50 years and older     Health Maintenance Due:      Topic Date Due    Hepatitis C Screening  Never done    Colorectal Cancer Screening  01/02/2025     Immunizations Due:      Topic Date Due    Pneumococcal Vaccine: 65+ Years (1 of 2 - PCV) Never done    Influenza Vaccine (1) Never done    COVID-19 Vaccine (1 - 2024-25 season) Never done     Advance Directives   What are advance directives?  Advance directives are legal documents that state your wishes and plans for medical care. These plans are made ahead of time in  case you lose your ability to make decisions for yourself. Advance directives can apply to any medical decision, such as the treatments you want, and if you want to donate organs.   What are the types of advance directives?  There are many types of advance directives, and each state has rules about how to use them. You may choose a combination of any of the following:  Living will:  This is a written record of the treatment you want. You can also choose which treatments you do not want, which to limit, and which to stop at a certain time. This includes surgery, medicine, IV fluid, and tube feedings.   Durable power of  for healthcare (DPAHC):  This is a written record that states who you want to make healthcare choices for you when you are unable to make them for yourself. This person, called a proxy, is usually a family member or a friend. You may choose more than 1 proxy.  Do not resuscitate (DNR) order:  A DNR order is used in case your heart stops beating or you stop breathing. It is a request not to have certain forms of treatment, such as CPR. A DNR order may be included in other types of advance directives.  Medical directive:  This covers the care that you want if you are in a coma, near death, or unable to make decisions for yourself. You can list the treatments you want for each condition. Treatment may include pain medicine, surgery, blood transfusions, dialysis, IV or tube feedings, and a ventilator (breathing machine).  Values history:  This document has questions about your views, beliefs, and how you feel and think about life. This information can help others choose the care that you would choose.  Why are advance directives important?  An advance directive helps you control your care. Although spoken wishes may be used, it is better to have your wishes written down. Spoken wishes can be misunderstood, or not followed. Treatments may be given even if you do not want them. An advance directive  may make it easier for your family to make difficult choices about your care.   Fall Prevention    Fall prevention  includes ways to make your home and other areas safer. It also includes ways you can move more carefully to prevent a fall. Health conditions that cause changes in your blood pressure, vision, or muscle strength and coordination may increase your risk for falls. Medicines may also increase your risk for falls if they make you dizzy, weak, or sleepy.   Fall prevention tips:   Stand or sit up slowly.    Use assistive devices as directed.    Wear shoes that fit well and have soles that .    Wear a personal alarm.    Stay active.    Manage your medical conditions.    Home Safety Tips:  Add items to prevent falls in the bathroom.    Keep paths clear.    Install bright lights in your home.    Keep items you use often on shelves within reach.    Paint or place reflective tape on the edges of your stairs.    Weight Management   Why it is important to manage your weight:  Being overweight increases your risk of health conditions such as heart disease, high blood pressure, type 2 diabetes, and certain types of cancer. It can also increase your risk for osteoarthritis, sleep apnea, and other respiratory problems. Aim for a slow, steady weight loss. Even a small amount of weight loss can lower your risk of health problems.  How to lose weight safely:  A safe and healthy way to lose weight is to eat fewer calories and get regular exercise. You can lose up about 1 pound a week by decreasing the number of calories you eat by 500 calories each day.   Healthy meal plan for weight management:  A healthy meal plan includes a variety of foods, contains fewer calories, and helps you stay healthy. A healthy meal plan includes the following:  Eat whole-grain foods more often.  A healthy meal plan should contain fiber. Fiber is the part of grains, fruits, and vegetables that is not broken down by your body. Whole-grain  foods are healthy and provide extra fiber in your diet. Some examples of whole-grain foods are whole-wheat breads and pastas, oatmeal, brown rice, and bulgur.  Eat a variety of vegetables every day.  Include dark, leafy greens such as spinach, kale, margaret greens, and mustard greens. Eat yellow and orange vegetables such as carrots, sweet potatoes, and winter squash.   Eat a variety of fruits every day.  Choose fresh or canned fruit (canned in its own juice or light syrup) instead of juice. Fruit juice has very little or no fiber.  Eat low-fat dairy foods.  Drink fat-free (skim) milk or 1% milk. Eat fat-free yogurt and low-fat cottage cheese. Try low-fat cheeses such as mozzarella and other reduced-fat cheeses.  Choose meat and other protein foods that are low in fat.  Choose beans or other legumes such as split peas or lentils. Choose fish, skinless poultry (chicken or turkey), or lean cuts of red meat (beef or pork). Before you cook meat or poultry, cut off any visible fat.   Use less fat and oil.  Try baking foods instead of frying them. Add less fat, such as margarine, sour cream, regular salad dressing and mayonnaise to foods. Eat fewer high-fat foods. Some examples of high-fat foods include french fries, doughnuts, ice cream, and cakes.  Eat fewer sweets.  Limit foods and drinks that are high in sugar. This includes candy, cookies, regular soda, and sweetened drinks.  Exercise:  Exercise at least 30 minutes per day on most days of the week. Some examples of exercise include walking, biking, dancing, and swimming. You can also fit in more physical activity by taking the stairs instead of the elevator or parking farther away from stores. Ask your healthcare provider about the best exercise plan for you.      © Copyright [employer] 2018 Information is for End User's use only and may not be sold, redistributed or otherwise used for commercial purposes. All illustrations and images included in CareNotes® are  the copyrighted property of [employer] or [employer]

## 2025-01-08 NOTE — ASSESSMENT & PLAN NOTE
-POCT hemoglobin A1c 7.4.  This has increased from 6.2 in August 2024.  - Continue metformin 1000 mg twice daily and Januvia 100 mg daily.  - Continue following diabetic diet with focus on low intake of carbohydrates and sugars.  - Patient requesting referral to podiatry.  Referral previously placed.  Advised to call to schedule an appointment.  - Patient unsure when last diabetic eye exam was.  Patient requesting to see an eye doctor.  Referral previously placed.  Advised to call to schedule an appointment.  -Reviewed albumin/creatinine urine ratio from September 2024.  Results are normal.  Lab Results   Component Value Date    HGBA1C 7.4 (A) 01/08/2025    HGBA1C 6.2 08/10/2024       Orders:    POCT hemoglobin A1c    Ambulatory Referral to Podiatry; Future    metFORMIN (GLUCOPHAGE) 1000 MG tablet; Take 1 tablet (1,000 mg total) by mouth 2 (two) times a day with meals    sitaGLIPtin (Januvia) 100 mg tablet; Take 1 tablet (100 mg total) by mouth daily

## 2025-01-08 NOTE — ASSESSMENT & PLAN NOTE
-Will increase gabapentin to 300 mg twice daily.  Orders:    gabapentin (Neurontin) 300 mg capsule; Take 1 capsule (300 mg total) by mouth 2 (two) times a day    Ambulatory Referral to Podiatry; Future

## 2025-01-09 ENCOUNTER — TELEPHONE (OUTPATIENT)
Age: 70
End: 2025-01-09

## 2025-01-09 NOTE — TELEPHONE ENCOUNTER
Caller: Duncan Sánchez    Doctor: NP    Reason for call: appt/checked chart to see if he was seen by us as ref to us said completed/he was never seen/re opened ref and scheduled    Call back#: NA

## 2025-01-11 NOTE — ASSESSMENT & PLAN NOTE
- Continue losartan 50 mg once daily.   - Currently blood pressure is controlled at this time.  Reviewed BP target goal with patient.    - Continue to maintain healthy balanced diet with focus on low salt intake. Limit alcohol intake.   - Advised to exercise at least 30 minutes a day for at least 5 days out of the week.     Orders:    losartan (COZAAR) 50 mg tablet; Take 1 tablet (50 mg total) by mouth daily

## 2025-01-11 NOTE — ASSESSMENT & PLAN NOTE
- Tamsulosin 0.4 mg daily restarted at last visit, however patient is now complaining of dry mouth side effects.  Explained to patient this could be secondary to tamsulosin use.  - Recommend patient trial 1 week without tamsulosin and see if his dry mouth symptoms improved.  If they do, could consider replacing tamsulosin with alternative medication.    Orders:    tamsulosin (FLOMAX) 0.4 mg; Take 1 capsule (0.4 mg total) by mouth daily

## 2025-01-11 NOTE — ASSESSMENT & PLAN NOTE
Orders:    cyclobenzaprine (FLEXERIL) 10 mg tablet; Take 1 tablet (10 mg total) by mouth 2 (two) times a day as needed for muscle spasms

## 2025-01-11 NOTE — ASSESSMENT & PLAN NOTE
- Continue simvastatin 40 mg daily.  -Reviewed lipid panel from September 2024.    Orders:    simvastatin (ZOCOR) 40 mg tablet; Take 1 tablet (40 mg total) by mouth every evening

## 2025-01-14 ENCOUNTER — OFFICE VISIT (OUTPATIENT)
Dept: OBGYN CLINIC | Facility: CLINIC | Age: 70
End: 2025-01-14
Payer: COMMERCIAL

## 2025-01-14 VITALS — HEIGHT: 61 IN | BODY MASS INDEX: 28.89 KG/M2 | WEIGHT: 153 LBS

## 2025-01-14 DIAGNOSIS — M47.816 LUMBAR SPONDYLOSIS: Primary | ICD-10-CM

## 2025-01-14 PROCEDURE — 99203 OFFICE O/P NEW LOW 30 MIN: CPT | Performed by: PHYSICIAN ASSISTANT

## 2025-01-14 NOTE — PROGRESS NOTES
"Patient Name:  Duncan Sánchez  MRN:  [id number]    Assessment & Plan     Low back pain and right lower extremity radiculitis in the setting of multilevel spondylotic changes.  No red flags or patient on examination today.  Referral to physical therapy.  Continue gabapentin.  Recommend Tylenol 1000 mg 3 times daily.  Patient states he is not able to take NSAIDs.  Activities as tolerated with modification avoid pain.  Follow-up in 6 weeks.  Consider MRI if symptoms persist.     utilized during today's encounter.    Chief Complaint     Low back pain    History of the Present Illness     Duncan Sánchez is a 69 y.o. male who reports to the office today for evaluation of his lumbar spine.  Patient reports chronic lumbar spine pain that began in 2020.  He reports a history of a fall which did result in a right hip fracture requiring surgery.  Since then he also notes intermittent low back pain.  He notes worsening pain recently in the axial lumbar spine.  Pain ranges from 0-9 out of 10 in intensity and is worse with increased activity.  He notes intermittent radiation along the right lower extremity to the foot.  He notes intermittent numbness and tingling in this distribution as well.  He notes weakness due to the pain.  He currently takes gabapentin and Flexeril intermittently with some improvement.  He describes the pain as a constant dull ache with intermittent sharp pain.  He denies any bowel or bladder dysfunction.  No saddle paresthesias.  He has not performed any recent physical therapy for his low back.  No fevers or chills.    Physical Exam     Ht 5' 1\" (1.549 m)   Wt 69.4 kg (153 lb)   BMI 28.91 kg/m²     Lumbar spine: No gross deformity.  Diffuse generalized tenderness to palpation about the paraspinal musculature bilaterally and bilateral SI joints and piriformis musculature.  No tenderness midline lumbar spine.  Motor/sensation intact L2-S1 bilaterally with 5 out of 5 strength.  Pain " noted in the lumbar spine with straight leg raise and ABY test bilaterally.    Eyes: Anicteric sclerae.  ENT: Trachea midline.  Lungs: Normal respiratory effort.  CV: Capillary refill is less than 2 seconds.  Skin: Intact without erythema.  Lymph: No palpable lymphadenopathy.  Neuro: Sensation is grossly intact to light touch.  Psych: Mood and affect are appropriate.    Data Review     I have personally reviewed pertinent films in PACS, and my interpretation follows:    X-rays lumbar spine 10/21/2024: Chronic appearing mild wedge deformity of L1.  Multilevel spondylotic degenerative changes appreciated.  No acute osseous abnormality or spondylolisthesis evident.    Past Medical History:   Diagnosis Date    Benign prostatic hyperplasia 08/10/2024    History of CVA (cerebrovascular accident) 08/06/2024    Hyperlipidemia 08/06/2024    Neuropathy 09/23/2024    Primary hypertension 08/06/2024    Type 2 diabetes mellitus without complication, without long-term current use of insulin (HCC) 08/06/2024       No past surgical history on file.    No Known Allergies    Current Outpatient Medications on File Prior to Visit   Medication Sig Dispense Refill    acetaminophen (TYLENOL) 325 mg tablet Take 650 mg by mouth every 6 (six) hours as needed for mild pain      cyclobenzaprine (FLEXERIL) 10 mg tablet Take 1 tablet (10 mg total) by mouth 2 (two) times a day as needed for muscle spasms 60 tablet 2    gabapentin (Neurontin) 300 mg capsule Take 1 capsule (300 mg total) by mouth 2 (two) times a day 180 capsule 1    losartan (COZAAR) 50 mg tablet Take 1 tablet (50 mg total) by mouth daily 90 tablet 1    metFORMIN (GLUCOPHAGE) 1000 MG tablet Take 1 tablet (1,000 mg total) by mouth 2 (two) times a day with meals 180 tablet 1    simvastatin (ZOCOR) 40 mg tablet Take 1 tablet (40 mg total) by mouth every evening 90 tablet 1    sitaGLIPtin (Januvia) 100 mg tablet Take 1 tablet (100 mg total) by mouth daily 90 tablet 1    tamsulosin  (FLOMAX) 0.4 mg Take 1 capsule (0.4 mg total) by mouth daily 90 capsule 1     No current facility-administered medications on file prior to visit.       Social History     Tobacco Use    Smoking status: Never     Passive exposure: Never    Smokeless tobacco: Never   Vaping Use    Vaping status: Never Used   Substance Use Topics    Alcohol use: Yes     Comment: socially    Drug use: Never       No family history on file.    Review of Systems     As stated in the HPI. All other systems reviewed and are negative.

## 2025-01-15 ENCOUNTER — TELEPHONE (OUTPATIENT)
Dept: ADMINISTRATIVE | Facility: OTHER | Age: 70
End: 2025-01-15

## 2025-01-15 NOTE — TELEPHONE ENCOUNTER
Upon review of the In Basket request and the patient's chart, initial outreach has been made via fax to facility. Please see Contacts section for details.     Thank you  Talia Marrero MA

## 2025-01-15 NOTE — LETTER
Diabetic Foot Exam Form    Date Requested: 01/15/25  Patient: Duncan Sánchez  Patient : 1955   Referring Provider: Mena Alexis PA-C    Diabetic Foot Exam Performed with shoes and socks removed        Yes         No     Date of Diabetic Foot Exam ______________________________  Risk Score ____________________________________________    Left Foot       Visual Inspection         Monofilament Testing Sensory Exam        Pedal Pulses         Additional Comments         Right Foot      Visual Inspection         Monofilament Testing Sensory Exam       Pedal Pulses         Additional Comments         Comments __________________________________________________________    Practice Providing Exam ______________________________________________    Exam Performed By (print name) _______________________________________      Provider Signature ___________________________________________________      These reports are needed for  compliance.    Please fax this completed form and a copy of the Diabetic Foot Exam report to our office located at [address] [place] [address] [place] [address] as soon as possible via Fax [phone] laury Melgar: Phone [phone]    We thank you for your assistance in treating our mutual patient.

## 2025-01-15 NOTE — TELEPHONE ENCOUNTER
----- Message from Mena Alexis PA-C sent at 1/11/2025  3:15 PM EST -----  Regarding: DM foot exam  01/11/25 3:15 PM    Tamika, our patient Ducnan Sánchez has had Diabetic Foot Exam completed/performed. Please assist in updating the patient chart by making an External outreach to [facility] located in [place], [place]. The date of service is 2024.    [facility], [place] [address] [address], [address] [phone]       Thank you,  WESLY Pastor

## 2025-01-15 NOTE — LETTER
Diabetic Foot Exam Form    Date Requested: 25  Patient: Duncan Sánchez  Patient : 1955   Referring Provider: Mena Alexis PA-C    Diabetic Foot Exam Performed with shoes and socks removed        Yes         No     Date of Diabetic Foot Exam ______________________________  Risk Score ____________________________________________    Left Foot       Visual Inspection         Monofilament Testing Sensory Exam        Pedal Pulses         Additional Comments         Right Foot      Visual Inspection         Monofilament Testing Sensory Exam       Pedal Pulses         Additional Comments         Comments __________________________________________________________    Practice Providing Exam ______________________________________________    Exam Performed By (print name) _______________________________________      Provider Signature ___________________________________________________      These reports are needed for  compliance.    Please fax this completed form and a copy of the Diabetic Foot Exam report to our office located at [address] [place] [address] [place] [address] as soon as possible via Fax [phone] laury Melgar: Phone [phone]    We thank you for your assistance in treating our mutual patient.

## 2025-01-28 NOTE — TELEPHONE ENCOUNTER
As a follow-up, a second attempt has been made for outreach via fax to facility. Please see Contacts section for details.    Thank you  Talia Marrero MA

## 2025-02-09 ENCOUNTER — APPOINTMENT (EMERGENCY)
Dept: CT IMAGING | Facility: HOSPITAL | Age: 70
DRG: 331 | End: 2025-02-09
Payer: COMMERCIAL

## 2025-02-09 ENCOUNTER — HOSPITAL ENCOUNTER (INPATIENT)
Facility: HOSPITAL | Age: 70
LOS: 10 days | Discharge: HOME HEALTH | DRG: 331 | End: 2025-02-19
Attending: EMERGENCY MEDICINE | Admitting: SURGERY
Payer: COMMERCIAL

## 2025-02-09 ENCOUNTER — APPOINTMENT (EMERGENCY)
Dept: RADIOLOGY | Facility: HOSPITAL | Age: 70
DRG: 331 | End: 2025-02-09
Payer: COMMERCIAL

## 2025-02-09 DIAGNOSIS — K57.20 PERFORATED DIVERTICULUM OF LARGE INTESTINE: Primary | ICD-10-CM

## 2025-02-09 DIAGNOSIS — R93.3 ABNORMAL COMPUTED TOMOGRAPHY OF CECUM AND TERMINAL ILEUM: ICD-10-CM

## 2025-02-09 PROBLEM — K57.92 DIVERTICULITIS: Status: ACTIVE | Noted: 2025-02-09

## 2025-02-09 LAB
2HR DELTA HS TROPONIN: -1 NG/L
4HR DELTA HS TROPONIN: -2 NG/L
ALBUMIN SERPL BCG-MCNC: 3.4 G/DL (ref 3.5–5)
ALP SERPL-CCNC: 45 U/L (ref 34–104)
ALT SERPL W P-5'-P-CCNC: 30 U/L (ref 7–52)
ANION GAP SERPL CALCULATED.3IONS-SCNC: 6 MMOL/L (ref 4–13)
APTT PPP: 37 SECONDS (ref 23–34)
AST SERPL W P-5'-P-CCNC: 22 U/L (ref 13–39)
ATRIAL RATE: 107 BPM
BASOPHILS # BLD MANUAL: 0 THOUSAND/UL (ref 0–0.1)
BASOPHILS NFR MAR MANUAL: 0 % (ref 0–1)
BILIRUB SERPL-MCNC: 0.62 MG/DL (ref 0.2–1)
BUN SERPL-MCNC: 17 MG/DL (ref 5–25)
CALCIUM ALBUM COR SERPL-MCNC: 9.5 MG/DL (ref 8.3–10.1)
CALCIUM SERPL-MCNC: 9 MG/DL (ref 8.4–10.2)
CARDIAC TROPONIN I PNL SERPL HS: 12 NG/L (ref ?–50)
CARDIAC TROPONIN I PNL SERPL HS: 13 NG/L (ref ?–50)
CARDIAC TROPONIN I PNL SERPL HS: 14 NG/L (ref ?–50)
CHLORIDE SERPL-SCNC: 103 MMOL/L (ref 96–108)
CO2 SERPL-SCNC: 22 MMOL/L (ref 21–32)
CREAT SERPL-MCNC: 0.73 MG/DL (ref 0.6–1.3)
EOSINOPHIL # BLD MANUAL: 0 THOUSAND/UL (ref 0–0.4)
EOSINOPHIL NFR BLD MANUAL: 0 % (ref 0–6)
ERYTHROCYTE [DISTWIDTH] IN BLOOD BY AUTOMATED COUNT: 13.5 % (ref 11.6–15.1)
FLUAV AG UPPER RESP QL IA.RAPID: NEGATIVE
FLUBV AG UPPER RESP QL IA.RAPID: NEGATIVE
GFR SERPL CREATININE-BSD FRML MDRD: 93 ML/MIN/1.73SQ M
GLUCOSE SERPL-MCNC: 110 MG/DL (ref 65–140)
GLUCOSE SERPL-MCNC: 175 MG/DL (ref 65–140)
HCT VFR BLD AUTO: 31.3 % (ref 36.5–49.3)
HGB BLD-MCNC: 10.2 G/DL (ref 12–17)
INR PPP: 1.33 (ref 0.85–1.19)
LACTATE SERPL-SCNC: 0.5 MMOL/L (ref 0.5–2)
LACTATE SERPL-SCNC: 1.4 MMOL/L (ref 0.5–2)
LIPASE SERPL-CCNC: 17 U/L (ref 11–82)
LYMPHOCYTES # BLD AUTO: 0.63 THOUSAND/UL (ref 0.6–4.47)
LYMPHOCYTES # BLD AUTO: 6 % (ref 14–44)
MAGNESIUM SERPL-MCNC: 1.4 MG/DL (ref 1.9–2.7)
MCH RBC QN AUTO: 28.7 PG (ref 26.8–34.3)
MCHC RBC AUTO-ENTMCNC: 32.6 G/DL (ref 31.4–37.4)
MCV RBC AUTO: 88 FL (ref 82–98)
MONOCYTES # BLD AUTO: 0.52 THOUSAND/UL (ref 0–1.22)
MONOCYTES NFR BLD: 5 % (ref 4–12)
NEUTROPHILS # BLD MANUAL: 9.32 THOUSAND/UL (ref 1.85–7.62)
NEUTS BAND NFR BLD MANUAL: 31 % (ref 0–8)
NEUTS SEG NFR BLD AUTO: 58 % (ref 43–75)
P AXIS: 65 DEGREES
PLATELET # BLD AUTO: 198 THOUSANDS/UL (ref 149–390)
PLATELET BLD QL SMEAR: ADEQUATE
PMV BLD AUTO: 10.3 FL (ref 8.9–12.7)
POTASSIUM SERPL-SCNC: 3.8 MMOL/L (ref 3.5–5.3)
PR INTERVAL: 142 MS
PROCALCITONIN SERPL-MCNC: 11.45 NG/ML
PROT SERPL-MCNC: 6.3 G/DL (ref 6.4–8.4)
PROTHROMBIN TIME: 16.6 SECONDS (ref 12.3–15)
QRS AXIS: 75 DEGREES
QRSD INTERVAL: 72 MS
QT INTERVAL: 308 MS
QTC INTERVAL: 411 MS
RBC # BLD AUTO: 3.56 MILLION/UL (ref 3.88–5.62)
RBC MORPH BLD: NORMAL
SARS-COV+SARS-COV-2 AG RESP QL IA.RAPID: NEGATIVE
SODIUM SERPL-SCNC: 131 MMOL/L (ref 135–147)
T WAVE AXIS: 45 DEGREES
VENTRICULAR RATE: 107 BPM
WBC # BLD AUTO: 10.47 THOUSAND/UL (ref 4.31–10.16)

## 2025-02-09 PROCEDURE — 83735 ASSAY OF MAGNESIUM: CPT | Performed by: EMERGENCY MEDICINE

## 2025-02-09 PROCEDURE — 83690 ASSAY OF LIPASE: CPT | Performed by: EMERGENCY MEDICINE

## 2025-02-09 PROCEDURE — 96361 HYDRATE IV INFUSION ADD-ON: CPT

## 2025-02-09 PROCEDURE — 83605 ASSAY OF LACTIC ACID: CPT | Performed by: EMERGENCY MEDICINE

## 2025-02-09 PROCEDURE — 87077 CULTURE AEROBIC IDENTIFY: CPT | Performed by: EMERGENCY MEDICINE

## 2025-02-09 PROCEDURE — 93005 ELECTROCARDIOGRAM TRACING: CPT

## 2025-02-09 PROCEDURE — 87811 SARS-COV-2 COVID19 W/OPTIC: CPT | Performed by: EMERGENCY MEDICINE

## 2025-02-09 PROCEDURE — 87804 INFLUENZA ASSAY W/OPTIC: CPT | Performed by: EMERGENCY MEDICINE

## 2025-02-09 PROCEDURE — 99291 CRITICAL CARE FIRST HOUR: CPT | Performed by: EMERGENCY MEDICINE

## 2025-02-09 PROCEDURE — 96375 TX/PRO/DX INJ NEW DRUG ADDON: CPT

## 2025-02-09 PROCEDURE — 87154 CUL TYP ID BLD PTHGN 6+ TRGT: CPT | Performed by: EMERGENCY MEDICINE

## 2025-02-09 PROCEDURE — 99284 EMERGENCY DEPT VISIT MOD MDM: CPT

## 2025-02-09 PROCEDURE — 87040 BLOOD CULTURE FOR BACTERIA: CPT | Performed by: EMERGENCY MEDICINE

## 2025-02-09 PROCEDURE — 85027 COMPLETE CBC AUTOMATED: CPT | Performed by: EMERGENCY MEDICINE

## 2025-02-09 PROCEDURE — 80053 COMPREHEN METABOLIC PANEL: CPT | Performed by: EMERGENCY MEDICINE

## 2025-02-09 PROCEDURE — 87186 SC STD MICRODIL/AGAR DIL: CPT | Performed by: EMERGENCY MEDICINE

## 2025-02-09 PROCEDURE — 74177 CT ABD & PELVIS W/CONTRAST: CPT

## 2025-02-09 PROCEDURE — 85610 PROTHROMBIN TIME: CPT | Performed by: EMERGENCY MEDICINE

## 2025-02-09 PROCEDURE — 84484 ASSAY OF TROPONIN QUANT: CPT | Performed by: EMERGENCY MEDICINE

## 2025-02-09 PROCEDURE — 93010 ELECTROCARDIOGRAM REPORT: CPT

## 2025-02-09 PROCEDURE — 85730 THROMBOPLASTIN TIME PARTIAL: CPT | Performed by: EMERGENCY MEDICINE

## 2025-02-09 PROCEDURE — 82948 REAGENT STRIP/BLOOD GLUCOSE: CPT

## 2025-02-09 PROCEDURE — NC001 PR NO CHARGE: Performed by: SURGERY

## 2025-02-09 PROCEDURE — 83605 ASSAY OF LACTIC ACID: CPT

## 2025-02-09 PROCEDURE — 96365 THER/PROPH/DIAG IV INF INIT: CPT

## 2025-02-09 PROCEDURE — 36415 COLL VENOUS BLD VENIPUNCTURE: CPT | Performed by: EMERGENCY MEDICINE

## 2025-02-09 PROCEDURE — 84145 PROCALCITONIN (PCT): CPT | Performed by: EMERGENCY MEDICINE

## 2025-02-09 PROCEDURE — 85007 BL SMEAR W/DIFF WBC COUNT: CPT | Performed by: EMERGENCY MEDICINE

## 2025-02-09 PROCEDURE — 71045 X-RAY EXAM CHEST 1 VIEW: CPT

## 2025-02-09 RX ORDER — ONDANSETRON 2 MG/ML
4 INJECTION INTRAMUSCULAR; INTRAVENOUS EVERY 6 HOURS PRN
Status: DISCONTINUED | OUTPATIENT
Start: 2025-02-09 | End: 2025-02-19 | Stop reason: HOSPADM

## 2025-02-09 RX ORDER — LABETALOL HYDROCHLORIDE 5 MG/ML
10 INJECTION, SOLUTION INTRAVENOUS EVERY 4 HOURS PRN
Status: DISCONTINUED | OUTPATIENT
Start: 2025-02-09 | End: 2025-02-11

## 2025-02-09 RX ORDER — HYDRALAZINE HYDROCHLORIDE 20 MG/ML
5 INJECTION INTRAMUSCULAR; INTRAVENOUS EVERY 4 HOURS PRN
Status: DISCONTINUED | OUTPATIENT
Start: 2025-02-09 | End: 2025-02-11

## 2025-02-09 RX ORDER — MAGNESIUM SULFATE HEPTAHYDRATE 40 MG/ML
2 INJECTION, SOLUTION INTRAVENOUS ONCE
Status: COMPLETED | OUTPATIENT
Start: 2025-02-09 | End: 2025-02-09

## 2025-02-09 RX ORDER — ONDANSETRON 2 MG/ML
4 INJECTION INTRAMUSCULAR; INTRAVENOUS ONCE
Status: COMPLETED | OUTPATIENT
Start: 2025-02-09 | End: 2025-02-09

## 2025-02-09 RX ORDER — SODIUM CHLORIDE, SODIUM GLUCONATE, SODIUM ACETATE, POTASSIUM CHLORIDE, MAGNESIUM CHLORIDE, SODIUM PHOSPHATE, DIBASIC, AND POTASSIUM PHOSPHATE .53; .5; .37; .037; .03; .012; .00082 G/100ML; G/100ML; G/100ML; G/100ML; G/100ML; G/100ML; G/100ML
1000 INJECTION, SOLUTION INTRAVENOUS ONCE
Status: COMPLETED | OUTPATIENT
Start: 2025-02-09 | End: 2025-02-09

## 2025-02-09 RX ORDER — SODIUM CHLORIDE, SODIUM GLUCONATE, SODIUM ACETATE, POTASSIUM CHLORIDE, MAGNESIUM CHLORIDE, SODIUM PHOSPHATE, DIBASIC, AND POTASSIUM PHOSPHATE .53; .5; .37; .037; .03; .012; .00082 G/100ML; G/100ML; G/100ML; G/100ML; G/100ML; G/100ML; G/100ML
75 INJECTION, SOLUTION INTRAVENOUS CONTINUOUS
Status: DISCONTINUED | OUTPATIENT
Start: 2025-02-09 | End: 2025-02-16

## 2025-02-09 RX ORDER — ENOXAPARIN SODIUM 100 MG/ML
40 INJECTION SUBCUTANEOUS DAILY
Status: DISCONTINUED | OUTPATIENT
Start: 2025-02-10 | End: 2025-02-19 | Stop reason: HOSPADM

## 2025-02-09 RX ORDER — HYDROMORPHONE HCL/PF 1 MG/ML
0.5 SYRINGE (ML) INJECTION EVERY 4 HOURS PRN
Status: DISCONTINUED | OUTPATIENT
Start: 2025-02-09 | End: 2025-02-19

## 2025-02-09 RX ORDER — ACETAMINOPHEN 10 MG/ML
1000 INJECTION, SOLUTION INTRAVENOUS EVERY 6 HOURS SCHEDULED
Status: COMPLETED | OUTPATIENT
Start: 2025-02-09 | End: 2025-02-11

## 2025-02-09 RX ORDER — HYDROMORPHONE HCL IN WATER/PF 6 MG/30 ML
0.2 PATIENT CONTROLLED ANALGESIA SYRINGE INTRAVENOUS
Status: DISCONTINUED | OUTPATIENT
Start: 2025-02-09 | End: 2025-02-19

## 2025-02-09 RX ORDER — MAGNESIUM SULFATE HEPTAHYDRATE 40 MG/ML
2 INJECTION, SOLUTION INTRAVENOUS ONCE
Status: DISCONTINUED | OUTPATIENT
Start: 2025-02-09 | End: 2025-02-09

## 2025-02-09 RX ORDER — INSULIN LISPRO 100 [IU]/ML
1-5 INJECTION, SOLUTION INTRAVENOUS; SUBCUTANEOUS EVERY 6 HOURS SCHEDULED
Status: DISCONTINUED | OUTPATIENT
Start: 2025-02-09 | End: 2025-02-18

## 2025-02-09 RX ADMIN — SODIUM CHLORIDE, SODIUM GLUCONATE, SODIUM ACETATE, POTASSIUM CHLORIDE, MAGNESIUM CHLORIDE, SODIUM PHOSPHATE, DIBASIC, AND POTASSIUM PHOSPHATE 1000 ML: .53; .5; .37; .037; .03; .012; .00082 INJECTION, SOLUTION INTRAVENOUS at 15:35

## 2025-02-09 RX ADMIN — PIPERACILLIN AND TAZOBACTAM 4.5 G: 36; 4.5 INJECTION, POWDER, LYOPHILIZED, FOR SOLUTION INTRAVENOUS at 15:19

## 2025-02-09 RX ADMIN — IOHEXOL 100 ML: 350 INJECTION, SOLUTION INTRAVENOUS at 14:12

## 2025-02-09 RX ADMIN — MAGNESIUM SULFATE HEPTAHYDRATE 2 G: 40 INJECTION, SOLUTION INTRAVENOUS at 16:27

## 2025-02-09 RX ADMIN — HYDROMORPHONE HYDROCHLORIDE 0.5 MG: 1 INJECTION, SOLUTION INTRAMUSCULAR; INTRAVENOUS; SUBCUTANEOUS at 18:52

## 2025-02-09 RX ADMIN — ACETAMINOPHEN 1000 MG: 10 INJECTION INTRAVENOUS at 17:13

## 2025-02-09 RX ADMIN — SODIUM CHLORIDE 1000 ML: 0.9 INJECTION, SOLUTION INTRAVENOUS at 13:16

## 2025-02-09 RX ADMIN — ONDANSETRON 4 MG: 2 INJECTION INTRAMUSCULAR; INTRAVENOUS at 13:17

## 2025-02-09 RX ADMIN — PIPERACILLIN AND TAZOBACTAM 4.5 G: 36; 4.5 INJECTION, POWDER, LYOPHILIZED, FOR SOLUTION INTRAVENOUS at 20:17

## 2025-02-09 RX ADMIN — SODIUM CHLORIDE, SODIUM GLUCONATE, SODIUM ACETATE, POTASSIUM CHLORIDE, MAGNESIUM CHLORIDE, SODIUM PHOSPHATE, DIBASIC, AND POTASSIUM PHOSPHATE 125 ML/HR: .53; .5; .37; .037; .03; .012; .00082 INJECTION, SOLUTION INTRAVENOUS at 17:05

## 2025-02-09 NOTE — ASSESSMENT & PLAN NOTE
70yo M presenting with abdominal pain, nausea, diarrhea, subjective fever worsening over the last day. CT scan concerning for perforated R sided diverticulitis a/w TI and appendix inflammation.    Afebrile, tachy to 115, normotensive on room air.  WBC 10.5, 31% bands  Hgb 10.2  Cr 0.73  LA 1.4    Plan  Admit to General Surgery service under SD2  NPO  IV fluids  Serial abdominal exams  Continue IV abx: zosyn  Will f/u on repeat lactate  Strict I/O, monitor for worsening tachycardia or hypotension.  Low threshold for operative intervention

## 2025-02-09 NOTE — Clinical Note
Case was discussed with    and the patient's admission status was agreed to be  to the service of   .

## 2025-02-09 NOTE — ED PROVIDER NOTES
Time reflects when diagnosis was documented in both MDM as applicable and the Disposition within this note       Time User Action Codes Description Comment    2/9/2025  4:08 PM Lizett Adler Add [K57.20] Perforated diverticulum of large intestine     2/9/2025  4:20 PM Lizett Adler Add [R93.3] Abnormal computed tomography of cecum and terminal ileum           ED Disposition       ED Disposition   Admit    Condition   Stable    Date/Time   Sun Feb 9, 2025  4:13 PM    Comment   Case was discussed with Dr. Dao and the patient's admission status was agreed to be inpatient to the service of Dr. Gaffney               Assessment & Plan       Medical Decision Making  Multiple complaints w/ concerning abd exam.  Will get labs to r/o acute life threatening metabolic abnl, pancreatitis, cholecystitis, significant leukocytosis or anemia.  Will get CT A/P to r/o acute life threatening intra-abd process (pancreatitis, cholecystitis, PUD w/wo perf, appendicitis w/wo perf, diverticulitis w/wo perf, colitis/enteritis w/wo perf, pyelonephritis).   will get UA to r/o occult infection.  Will get viral swab to r/o covid/flu  IVF and re-eval    Problems Addressed:  Abnormal computed tomography of cecum and terminal ileum: acute illness or injury that poses a threat to life or bodily functions  Perforated diverticulum of large intestine: acute illness or injury that poses a threat to life or bodily functions    Amount and/or Complexity of Data Reviewed  Independent Historian: caregiver  Labs: ordered. Decision-making details documented in ED Course.  Radiology: ordered.  ECG/medicine tests: ordered and independent interpretation performed.  Discussion of management or test interpretation with external provider(s): D/w Dr. Garcia, radiology  D/w Dr. Dao, surgery    Risk  Prescription drug management.  Decision regarding hospitalization.        ED Course as of 02/09/25 1621   Herman Feb 09, 2025   1328 Hemoglobin(!):  10.2  11.6 four months ago   1430 Bands %(!): 31  Second SIRS     1435 LACTIC ACID: 1.4   1435 POCT INR(!): 1.33  Does not meet any severe sepsis criteria   1450 Spoke w/ Dr. Garcia  Likely right sided diverticulitis w/ perf along w/ inflammed appendix/terminal ileum.    Will contact surgery   1455 Message sent to surgery to discuss   1500 D/w pt lab/rad findings and need for surgical evaluation   1505 Surgery at bedside   1615 Per Dr. Dao, will admit to surgical service  No surgery now, will continue abx, serial exams       Medications   multi-electrolyte (ISOLYTE-S PH 7.4) bolus 1,000 mL (1,000 mL Intravenous New Bag 2/9/25 1535)   magnesium sulfate 2 g/50 mL IVPB (premix) 2 g (has no administration in time range)   sodium chloride 0.9 % bolus 1,000 mL (0 mL Intravenous Stopped 2/9/25 1518)   ondansetron (ZOFRAN) injection 4 mg (4 mg Intravenous Given 2/9/25 1317)   iohexol (OMNIPAQUE) 350 MG/ML injection (MULTI-DOSE) 100 mL (100 mL Intravenous Given 2/9/25 1412)   piperacillin-tazobactam (ZOSYN) IVPB 4.5 g (4.5 g Intravenous New Bag 2/9/25 1519)       ED Risk Strat Scores                          SBIRT 22yo+      Flowsheet Row Most Recent Value   Initial Alcohol Screen: US AUDIT-C     1. How often do you have a drink containing alcohol? 0 Filed at: 02/09/2025 1223   2. How many drinks containing alcohol do you have on a typical day you are drinking?  0 Filed at: 02/09/2025 1223   3a. Male UNDER 65: How often do you have five or more drinks on one occasion? 0 Filed at: 02/09/2025 1223   3b. FEMALE Any Age, or MALE 65+: How often do you have 4 or more drinks on one occassion? 0 Filed at: 02/09/2025 1223   Audit-C Score 0 Filed at: 02/09/2025 1223   MALIKA: How many times in the past year have you...    Used an illegal drug or used a prescription medication for non-medical reasons? Never Filed at: 02/09/2025 1223                            History of Present Illness       Chief Complaint   Patient presents with     Flu Symptoms     Patient arrives via EMS with c/o cough, fever, generalized body aches, headache, abdominal pain, N/V for past 2 days.        Past Medical History:   Diagnosis Date    Benign prostatic hyperplasia 08/10/2024    History of CVA (cerebrovascular accident) 08/06/2024    Hyperlipidemia 08/06/2024    Neuropathy 09/23/2024    Primary hypertension 08/06/2024    Type 2 diabetes mellitus without complication, without long-term current use of insulin (HCC) 08/06/2024      History reviewed. No pertinent surgical history.   History reviewed. No pertinent family history.   Social History     Tobacco Use    Smoking status: Never     Passive exposure: Never    Smokeless tobacco: Never   Vaping Use    Vaping status: Never Used   Substance Use Topics    Alcohol use: Yes     Comment: socially    Drug use: Never      E-Cigarette/Vaping    E-Cigarette Use Never User       E-Cigarette/Vaping Substances    Nicotine No     THC No     CBD No     Flavoring No     Other No     Unknown No       I have reviewed and agree with the history as documented.     Patient presents with:  Flu Symptoms: Patient arrives via EMS with c/o cough, fever, generalized body aches, headache, abdominal pain, N/V for past 2 days.     69y M here with caregiver w/ multiple complaints.  Started yesterday w/ subjective fever, chills, no sweats, +headache, no nasal congestion, no sore throat but states mouth and throat very dry. +cough w/ some pleuritic chest pain, no sob/verma. C/o diffuse abd pain, +anorexia, +nausea, no vomiting, +diarrhea.  C/o diffuse body aches and weakness.  C/o malaise and fatigue.    Denies sick contacts. States he is vaccinated for influenza - no information under immunizations tab.  Denies previous abd surgeries.      History provided by:  Patient and caregiver   used: Yes ([id number])    Flu Symptoms      Review of Systems   All other systems reviewed and are negative.          Objective       ED Triage  Vitals   Temperature Pulse Blood Pressure Respirations SpO2 Patient Position - Orthostatic VS   02/09/25 1225 02/09/25 1217 02/09/25 1217 02/09/25 1217 02/09/25 1217 02/09/25 1217   99.8 °F (37.7 °C) (!) 115 122/60 18 99 % Lying      Temp Source Heart Rate Source BP Location FiO2 (%) Pain Score    02/09/25 1225 02/09/25 1217 02/09/25 1217 -- --    Oral Monitor Left arm        Vitals      Date and Time Temp Pulse SpO2 Resp BP Pain Score FACES Pain Rating User   02/09/25 1530 -- 112 99 % 18 133/80 -- -- EK   02/09/25 1400 -- 108 98 % 18 149/65 -- -- EK   02/09/25 1225 99.8 °F (37.7 °C) -- -- -- -- -- -- EK   02/09/25 1217 -- 115 99 % 18 122/60 -- -- EK            Physical Exam  Vitals and nursing note reviewed.   Constitutional:       Appearance: Normal appearance. He is ill-appearing (mildly).   HENT:      Mouth/Throat:      Mouth: Mucous membranes are dry.   Eyes:      Conjunctiva/sclera: Conjunctivae normal.   Cardiovascular:      Rate and Rhythm: Regular rhythm. Tachycardia present.      Heart sounds: No murmur heard.  Pulmonary:      Effort: Pulmonary effort is normal. No respiratory distress.      Breath sounds: Normal breath sounds. No stridor. No wheezing, rhonchi or rales.   Abdominal:      General: Bowel sounds are decreased. There is distension (mild).      Palpations: Abdomen is soft.      Tenderness: There is generalized abdominal tenderness. There is guarding. There is no rebound.      Comments: Pt not tolerating abdominal exam well 2/2 pain/tenderness. Some local guarding noted - unable to deeply palpate so exam is somewhat limited   Musculoskeletal:         General: No swelling.      Cervical back: Normal range of motion and neck supple.   Skin:     General: Skin is warm.      Capillary Refill: Capillary refill takes 2 to 3 seconds.      Coloration: Skin is pale.   Neurological:      General: No focal deficit present.      Mental Status: He is alert.   Psychiatric:         Mood and Affect: Mood normal.          Results Reviewed       Procedure Component Value Units Date/Time    HS Troponin I 2hr [[id number]]  (Normal) Collected: 02/09/25 1522    Lab Status: Final result Specimen: Blood from Arm, Right Updated: 02/09/25 1556     hs TnI 2hr 13 ng/L      Delta 2hr hsTnI -1 ng/L     Blood culture #1 [[id number]] Collected: 02/09/25 1519    Lab Status: In process Specimen: Blood from Arm, Right Updated: 02/09/25 1525    Blood culture #2 [[id number]] Collected: 02/09/25 1519    Lab Status: In process Specimen: Blood from Arm, Left Updated: 02/09/25 1525    HS Troponin I 4hr [[id number]]     Lab Status: No result Specimen: Blood     Manual Differential(PHLEBS Do Not Order) [[id number]]  (Abnormal) Collected: 02/09/25 1312    Lab Status: Final result Specimen: Blood from Arm, Right Updated: 02/09/25 1423     Segmented % 58 %      Bands % 31 %      Lymphocytes % 6 %      Monocytes % 5 %      Eosinophils % 0 %      Basophils % 0 %      Absolute Neutrophils 9.32 Thousand/uL      Absolute Lymphocytes 0.63 Thousand/uL      Absolute Monocytes 0.52 Thousand/uL      Absolute Eosinophils 0.00 Thousand/uL      Absolute Basophils 0.00 Thousand/uL      Total Counted --     RBC Morphology Normal     Platelet Estimate Adequate    FLU/COVID Rapid Antigen (30 min. TAT) - Preferred screening test in ED [[id number]]  (Normal) Collected: 02/09/25 1312    Lab Status: Final result Specimen: Nares from Nose Updated: 02/09/25 1402     SARS COV Rapid Antigen Negative     Influenza A Rapid Antigen Negative     Influenza B Rapid Antigen Negative    Narrative:      This test has been performed using the [employer] Rebecca 2 FLU+SARS Antigen test under the Emergency Use Authorization (EUA). This test has been validated by the  and verified by the performing laboratory. The Rebecca uses lateral flow immunofluorescent sandwich assay to detect SARS-COV, Influenza A and Influenza B Antigen.     The [employer]idel Rebecca 2 SARS Antigen test does not differentiate  between SARS-CoV and SARS-CoV-2.     Negative results are presumptive and may be confirmed with a molecular assay, if necessary, for patient management. Negative results do not rule out SARS-CoV-2 or influenza infection and should not be used as the sole basis for treatment or patient management decisions. A negative test result may occur if the level of antigen in a sample is below the limit of detection of this test.     Positive results are indicative of the presence of viral antigens, but do not rule out bacterial infection or co-infection with other viruses.     All test results should be used as an adjunct to clinical observations and other information available to the provider.    FOR PEDIATRIC PATIENTS - copy/paste COVID Guidelines URL to browser: [email or web address]    Protime-INR [[id number]]  (Abnormal) Collected: 02/09/25 1312    Lab Status: Final result Specimen: Blood from Arm, Right Updated: 02/09/25 1355     Protime 16.6 seconds      INR 1.33    Narrative:      INR Therapeutic Range    Indication                                             INR Range      Atrial Fibrillation                                               2.0-3.0  Hypercoagulable State                                    2.0.2.3  Left Ventricular Asist Device                            2.0-3.0  Mechanical Heart Valve                                  -    Aortic(with afib, MI, embolism, HF, LA enlargement,    and/or coagulopathy)                                     2.0-3.0 (2.5-3.5)     Mitral                                                             2.5-3.5  Prosthetic/Bioprosthetic Heart Valve               2.0-3.0  Venous thromboembolism (VTE: VT, PE        2.0-3.0    APTT [[id number]]  (Abnormal) Collected: 02/09/25 1312    Lab Status: Final result Specimen: Blood from Arm, Right Updated: 02/09/25 1355     PTT 37 seconds     Procalcitonin [[id number]]  (Abnormal) Collected:  02/09/25 1312    Lab Status: Final result Specimen: Blood from Arm, Right Updated: 02/09/25 1349     Procalcitonin 11.45 ng/ml     HS Troponin 0hr (reflex protocol) [[id number]]  (Normal) Collected: 02/09/25 1312    Lab Status: Final result Specimen: Blood from Arm, Right Updated: 02/09/25 1345     hs TnI 0hr 14 ng/L     Comprehensive metabolic panel [[id number]]  (Abnormal) Collected: 02/09/25 1312    Lab Status: Final result Specimen: Blood from Arm, Right Updated: 02/09/25 1344     Sodium 131 mmol/L      Potassium 3.8 mmol/L      Chloride 103 mmol/L      CO2 22 mmol/L      ANION GAP 6 mmol/L      BUN 17 mg/dL      Creatinine 0.73 mg/dL      Glucose 175 mg/dL      Calcium 9.0 mg/dL      Corrected Calcium 9.5 mg/dL      AST 22 U/L      ALT 30 U/L      Alkaline Phosphatase 45 U/L      Total Protein 6.3 g/dL      Albumin 3.4 g/dL      Total Bilirubin 0.62 mg/dL      eGFR 93 ml/min/1.73sq m     Narrative:      National Kidney Disease Foundation guidelines for Chronic Kidney Disease (CKD):     Stage 1 with normal or high GFR (GFR > 90 mL/min/1.73 square meters)    Stage 2 Mild CKD (GFR = 60-89 mL/min/1.73 square meters)    Stage 3A Moderate CKD (GFR = 45-59 mL/min/1.73 square meters)    Stage 3B Moderate CKD (GFR = 30-44 mL/min/1.73 square meters)    Stage 4 Severe CKD (GFR = 15-29 mL/min/1.73 square meters)    Stage 5 End Stage CKD (GFR <15 mL/min/1.73 square meters)  Note: GFR calculation is accurate only with a steady state creatinine    Magnesium [[id number]]  (Abnormal) Collected: 02/09/25 1312    Lab Status: Final result Specimen: Blood from Arm, Right Updated: 02/09/25 1344     Magnesium 1.4 mg/dL     Lipase [[id number]]  (Normal) Collected: 02/09/25 1312    Lab Status: Final result Specimen: Blood from Arm, Right Updated: 02/09/25 1344     Lipase 17 u/L     Lactic acid, plasma (w/reflex if result > 2.0) [[id number]]  (Normal) Collected: 02/09/25 1312    Lab Status: Final result Specimen: Blood from Arm, Right  Updated: 02/09/25 1341     LACTIC ACID 1.4 mmol/L     Narrative:      Result may be elevated if tourniquet was used during collection.    CBC and differential [[id number]]  (Abnormal) Collected: 02/09/25 1312    Lab Status: Final result Specimen: Blood from Arm, Right Updated: 02/09/25 1329     WBC 10.47 Thousand/uL      RBC 3.56 Million/uL      Hemoglobin 10.2 g/dL      Hematocrit 31.3 %      MCV 88 fL      MCH 28.7 pg      MCHC 32.6 g/dL      RDW 13.5 %      MPV 10.3 fL      Platelets 198 Thousands/uL     Narrative:      This is an appended report.  These results have been appended to a previously verified report.            CT abdomen pelvis with contrast   Final Interpretation by Genaro Garcia MD (02/09 1549)      Inflammatory changes in the right lower quadrant with gas adjacent to the thickened cecum. There are multiple right-sided diverticula and this is most likely a perforated diverticulitis. The terminal ileum and appendix are both inflamed though more    likely secondarily inflamed given the location of the free air adjacent to the cecum.      I personally discussed this study with RUDY ADLER on 2/9/2025 2:24 PM.            Workstation performed: [id number]         XR chest 1 view portable    (Results Pending)       ECG 12 Lead Documentation Only    Date/Time: 2/9/2025 12:35 PM    Performed by: Rudy Adler DO  Authorized by: Rudy Adler DO    Indications / Diagnosis:  Weakness  ECG reviewed by me, the ED Provider: yes    Patient location:  ED  Previous ECG:     Previous ECG:  Unavailable  Interpretation:     Interpretation: non-specific    Rate:     ECG rate:  107    ECG rate assessment: tachycardic    Rhythm:     Rhythm: sinus tachycardia    QRS:     QRS axis:  Normal  Conduction:     Conduction: normal    ST segments:     ST segments:  Normal  T waves:     T waves: normal    CriticalCare Time    Date/Time: 2/9/2025 4:10 PM    Performed by: Rudy Adler DO  Authorized by: Rudy ADKINS  DO Vitor    Critical care provider statement:     Critical care time (minutes):  35    Critical care time was exclusive of:  Separately billable procedures and treating other patients and teaching time    Critical care was necessary to treat or prevent imminent or life-threatening deterioration of the following conditions: acute surgical crisis.    Critical care was time spent personally by me on the following activities:  Blood draw for specimens, obtaining history from patient or surrogate, development of treatment plan with patient or surrogate, discussions with consultants, evaluation of patient's response to treatment, examination of patient, ordering and performing treatments and interventions, ordering and review of laboratory studies, ordering and review of radiographic studies, re-evaluation of patient's condition and review of old charts    I assumed direction of critical care for this patient from another provider in my specialty: no        ED Medication and Procedure Management   Prior to Admission Medications   Prescriptions Last Dose Informant Patient Reported? Taking?   acetaminophen (TYLENOL) 325 mg tablet   Yes No   Sig: Take 650 mg by mouth every 6 (six) hours as needed for mild pain   cyclobenzaprine (FLEXERIL) 10 mg tablet   No No   Sig: Take 1 tablet (10 mg total) by mouth 2 (two) times a day as needed for muscle spasms   gabapentin (Neurontin) 300 mg capsule   No No   Sig: Take 1 capsule (300 mg total) by mouth 2 (two) times a day   losartan (COZAAR) 50 mg tablet   No No   Sig: Take 1 tablet (50 mg total) by mouth daily   metFORMIN (GLUCOPHAGE) 1000 MG tablet   No No   Sig: Take 1 tablet (1,000 mg total) by mouth 2 (two) times a day with meals   simvastatin (ZOCOR) 40 mg tablet   No No   Sig: Take 1 tablet (40 mg total) by mouth every evening   sitaGLIPtin (Januvia) 100 mg tablet   No No   Sig: Take 1 tablet (100 mg total) by mouth daily   tamsulosin (FLOMAX) 0.4 mg   No No   Sig: Take 1  capsule (0.4 mg total) by mouth daily      Facility-Administered Medications: None     Patient's Medications   Discharge Prescriptions    No medications on file     No discharge procedures on file.  ED SEPSIS DOCUMENTATION   Time reflects when diagnosis was documented in both MDM as applicable and the Disposition within this note       Time User Action Codes Description Comment    2/9/2025  4:08 PM Lizett Adler Add [K57.20] Perforated diverticulum of large intestine     2/9/2025  4:20 PM Lizett Adler Add [R93.3] Abnormal computed tomography of cecum and terminal ileum                  Lizett Adler DO  02/09/25 1621

## 2025-02-09 NOTE — H&P
H&P - Surgery-General   Name: Duncan Sánchez 69 y.o. male I MRN: [id number]  Unit/Bed#: ED-39 I Date of Admission: 2/9/2025   Date of Service: 2/9/2025 I Hospital Day: 0     Assessment & Plan  Diverticulitis  70yo M presenting with abdominal pain, nausea, diarrhea, subjective fever worsening over the last day. CT scan concerning for perforated R sided diverticulitis a/w TI and appendix inflammation.    Afebrile, tachy to 115, normotensive on room air.  WBC 10.5, 31% bands  Hgb 10.2  Cr 0.73  LA 1.4    Plan  Admit to General Surgery service under SD2  NPO  IV fluids  Serial abdominal exams  Continue IV abx: zosyn  Will f/u on repeat lactate  Strict I/O, monitor for worsening tachycardia or hypotension.  Low threshold for operative intervention    History of Present Illness   Duncan Sánchez is a 69 y.o. male with a past medical history of non-insulin-dependent T2DM, hypertension who presents with worsening abdominal pain, nausea without vomiting, subjective fevers, foul-smelling dark stool which started last night.  Patient reports longstanding history of abdominal pain associated with eating which he controls with antiacids and Tylenol.  The the worsening pain that developed overnight has been more severe.  He reports his last bowel movement was this morning and last ate yesterday.  He is hungry.  Patient endorses body aches, fatigue and cough associated with pleuritic chest pain but without shortness of breath. he denies any previous abdominal surgery, does not smoke, does not take AC/AP.  His last colonoscopy was September 2024  which was not completed due to inadequate prep.  Only the sigmoid and part of the descending colon were visualized which showed diverticulosis of severe severity.  He was instructed to schedule repeat colonoscopy which she declined.    Review of Systems  I have reviewed the patient's PMH, PSH, Social History, Family History, Meds, and Allergies    Objective :  Temp:  [99.8 °F (37.7 °C)]  99.8 °F (37.7 °C)  HR:  [108-115] 112  BP: (122-149)/(60-80) 133/80  Resp:  [18] 18  SpO2:  [98 %-99 %] 99 %  O2 Device: None (Room air)      Physical Exam  General: NAD  HENT: NCAT MMM  Neck: supple, no JVD  CV: nl rate  Lungs: nl wob. No resp distress  ABD: Soft, diffuse severe tenderness more severe in RLQ, nondistended.  Voluntary guarding  Extrem: No CCE  Neuro: AAOx3      Lab Results: I have reviewed the following results:  Recent Labs     02/09/25  1312 02/09/25  1522   WBC 10.47*  --    HGB 10.2*  --    HCT 31.3*  --      --    BANDSPCT 31*  --    SODIUM 131*  --    K 3.8  --      --    CO2 22  --    BUN 17  --    CREATININE 0.73  --    GLUC 175*  --    MG 1.4*  --    AST 22  --    ALT 30  --    ALB 3.4*  --    TBILI 0.62  --    ALKPHOS 45  --    PTT 37*  --    INR 1.33*  --    HSTNI0 14  --    HSTNI2  --  13   LACTICACID 1.4  --              VTE Pharmacologic Prophylaxis: VTE covered by:  [START ON 2/10/2025] enoxaparin, Subcutaneous     VTE Mechanical Prophylaxis: sequential compression device

## 2025-02-10 LAB
ANION GAP SERPL CALCULATED.3IONS-SCNC: 14 MMOL/L (ref 4–13)
ANISOCYTOSIS BLD QL SMEAR: PRESENT
ATRIAL RATE: 106 BPM
ATRIAL RATE: 108 BPM
BASOPHILS # BLD MANUAL: 0 THOUSAND/UL (ref 0–0.1)
BASOPHILS NFR MAR MANUAL: 0 % (ref 0–1)
BUN SERPL-MCNC: 8 MG/DL (ref 5–25)
BURR CELLS BLD QL SMEAR: PRESENT
CA-I BLD-SCNC: 1.11 MMOL/L (ref 1.12–1.32)
CALCIUM SERPL-MCNC: 6.4 MG/DL (ref 8.4–10.2)
CHLORIDE SERPL-SCNC: 101 MMOL/L (ref 96–108)
CO2 SERPL-SCNC: 18 MMOL/L (ref 21–32)
CREAT SERPL-MCNC: 0.53 MG/DL (ref 0.6–1.3)
EOSINOPHIL # BLD MANUAL: 0.32 THOUSAND/UL (ref 0–0.4)
EOSINOPHIL NFR BLD MANUAL: 4 % (ref 0–6)
ERYTHROCYTE [DISTWIDTH] IN BLOOD BY AUTOMATED COUNT: 13.8 % (ref 11.6–15.1)
GFR SERPL CREATININE-BSD FRML MDRD: 106 ML/MIN/1.73SQ M
GLUCOSE SERPL-MCNC: 103 MG/DL (ref 65–140)
GLUCOSE SERPL-MCNC: 129 MG/DL (ref 65–140)
GLUCOSE SERPL-MCNC: 130 MG/DL (ref 65–140)
GLUCOSE SERPL-MCNC: 131 MG/DL (ref 65–140)
GLUCOSE SERPL-MCNC: 161 MG/DL (ref 65–140)
HCT VFR BLD AUTO: 26.6 % (ref 36.5–49.3)
HELMET CELLS BLD QL SMEAR: PRESENT
HGB BLD-MCNC: 8.7 G/DL (ref 12–17)
LYMPHOCYTES # BLD AUTO: 0.87 THOUSAND/UL (ref 0.6–4.47)
LYMPHOCYTES # BLD AUTO: 11 % (ref 14–44)
MAGNESIUM SERPL-MCNC: 2.5 MG/DL (ref 1.9–2.7)
MCH RBC QN AUTO: 29.2 PG (ref 26.8–34.3)
MCHC RBC AUTO-ENTMCNC: 32.7 G/DL (ref 31.4–37.4)
MCV RBC AUTO: 89 FL (ref 82–98)
MONOCYTES # BLD AUTO: 0.32 THOUSAND/UL (ref 0–1.22)
MONOCYTES NFR BLD: 4 % (ref 4–12)
NEUTROPHILS # BLD MANUAL: 6.4 THOUSAND/UL (ref 1.85–7.62)
NEUTS BAND NFR BLD MANUAL: 13 % (ref 0–8)
NEUTS SEG NFR BLD AUTO: 68 % (ref 43–75)
OVALOCYTES BLD QL SMEAR: PRESENT
P AXIS: 62 DEGREES
P AXIS: 68 DEGREES
PHOSPHATE SERPL-MCNC: 2.8 MG/DL (ref 2.3–4.1)
PLATELET # BLD AUTO: 155 THOUSANDS/UL (ref 149–390)
PLATELET BLD QL SMEAR: ADEQUATE
PMV BLD AUTO: 9.5 FL (ref 8.9–12.7)
POTASSIUM SERPL-SCNC: 4.5 MMOL/L (ref 3.5–5.3)
PR INTERVAL: 142 MS
PR INTERVAL: 148 MS
QRS AXIS: 67 DEGREES
QRS AXIS: 69 DEGREES
QRSD INTERVAL: 72 MS
QRSD INTERVAL: 80 MS
QT INTERVAL: 304 MS
QT INTERVAL: 312 MS
QTC INTERVAL: 407 MS
QTC INTERVAL: 414 MS
RBC # BLD AUTO: 2.98 MILLION/UL (ref 3.88–5.62)
RBC MORPH BLD: PRESENT
SCHISTOCYTES BLD QL SMEAR: PRESENT
SODIUM SERPL-SCNC: 133 MMOL/L (ref 135–147)
T WAVE AXIS: 52 DEGREES
T WAVE AXIS: 56 DEGREES
VENTRICULAR RATE: 106 BPM
VENTRICULAR RATE: 108 BPM
WBC # BLD AUTO: 7.9 THOUSAND/UL (ref 4.31–10.16)

## 2025-02-10 PROCEDURE — 93010 ELECTROCARDIOGRAM REPORT: CPT

## 2025-02-10 PROCEDURE — NC001 PR NO CHARGE: Performed by: SURGERY

## 2025-02-10 PROCEDURE — 85007 BL SMEAR W/DIFF WBC COUNT: CPT

## 2025-02-10 PROCEDURE — 80048 BASIC METABOLIC PNL TOTAL CA: CPT

## 2025-02-10 PROCEDURE — 83735 ASSAY OF MAGNESIUM: CPT

## 2025-02-10 PROCEDURE — 85027 COMPLETE CBC AUTOMATED: CPT

## 2025-02-10 PROCEDURE — 82948 REAGENT STRIP/BLOOD GLUCOSE: CPT

## 2025-02-10 PROCEDURE — 84100 ASSAY OF PHOSPHORUS: CPT

## 2025-02-10 PROCEDURE — 82330 ASSAY OF CALCIUM: CPT

## 2025-02-10 RX ORDER — CALCIUM GLUCONATE 20 MG/ML
2 INJECTION, SOLUTION INTRAVENOUS ONCE
Status: COMPLETED | OUTPATIENT
Start: 2025-02-10 | End: 2025-02-10

## 2025-02-10 RX ORDER — CALCIUM GLUCONATE 20 MG/ML
1 INJECTION, SOLUTION INTRAVENOUS ONCE
Status: COMPLETED | OUTPATIENT
Start: 2025-02-10 | End: 2025-02-10

## 2025-02-10 RX ORDER — TAMSULOSIN HYDROCHLORIDE 0.4 MG/1
0.4 CAPSULE ORAL DAILY
Status: DISCONTINUED | OUTPATIENT
Start: 2025-02-10 | End: 2025-02-19 | Stop reason: HOSPADM

## 2025-02-10 RX ADMIN — LABETALOL HYDROCHLORIDE 10 MG: 5 INJECTION, SOLUTION INTRAVENOUS at 10:39

## 2025-02-10 RX ADMIN — LABETALOL HYDROCHLORIDE 10 MG: 5 INJECTION, SOLUTION INTRAVENOUS at 05:36

## 2025-02-10 RX ADMIN — HYDROMORPHONE HYDROCHLORIDE 0.5 MG: 1 INJECTION, SOLUTION INTRAMUSCULAR; INTRAVENOUS; SUBCUTANEOUS at 00:08

## 2025-02-10 RX ADMIN — SODIUM CHLORIDE, SODIUM GLUCONATE, SODIUM ACETATE, POTASSIUM CHLORIDE, MAGNESIUM CHLORIDE, SODIUM PHOSPHATE, DIBASIC, AND POTASSIUM PHOSPHATE 125 ML/HR: .53; .5; .37; .037; .03; .012; .00082 INJECTION, SOLUTION INTRAVENOUS at 01:59

## 2025-02-10 RX ADMIN — ENOXAPARIN SODIUM 40 MG: 40 INJECTION SUBCUTANEOUS at 08:34

## 2025-02-10 RX ADMIN — ACETAMINOPHEN 1000 MG: 10 INJECTION INTRAVENOUS at 05:07

## 2025-02-10 RX ADMIN — CALCIUM GLUCONATE 2 G: 20 INJECTION, SOLUTION INTRAVENOUS at 08:34

## 2025-02-10 RX ADMIN — INSULIN LISPRO 1 UNITS: 100 INJECTION, SOLUTION INTRAVENOUS; SUBCUTANEOUS at 12:33

## 2025-02-10 RX ADMIN — PIPERACILLIN AND TAZOBACTAM 4.5 G: 36; 4.5 INJECTION, POWDER, LYOPHILIZED, FOR SOLUTION INTRAVENOUS at 20:26

## 2025-02-10 RX ADMIN — SODIUM CHLORIDE, SODIUM GLUCONATE, SODIUM ACETATE, POTASSIUM CHLORIDE, MAGNESIUM CHLORIDE, SODIUM PHOSPHATE, DIBASIC, AND POTASSIUM PHOSPHATE 125 ML/HR: .53; .5; .37; .037; .03; .012; .00082 INJECTION, SOLUTION INTRAVENOUS at 09:33

## 2025-02-10 RX ADMIN — ACETAMINOPHEN 1000 MG: 10 INJECTION INTRAVENOUS at 12:33

## 2025-02-10 RX ADMIN — ACETAMINOPHEN 1000 MG: 10 INJECTION INTRAVENOUS at 00:15

## 2025-02-10 RX ADMIN — HYDROMORPHONE HYDROCHLORIDE 0.5 MG: 1 INJECTION, SOLUTION INTRAMUSCULAR; INTRAVENOUS; SUBCUTANEOUS at 16:10

## 2025-02-10 RX ADMIN — TAMSULOSIN HYDROCHLORIDE 0.4 MG: 0.4 CAPSULE ORAL at 08:34

## 2025-02-10 RX ADMIN — ACETAMINOPHEN 1000 MG: 10 INJECTION INTRAVENOUS at 19:09

## 2025-02-10 RX ADMIN — PIPERACILLIN AND TAZOBACTAM 4.5 G: 36; 4.5 INJECTION, POWDER, LYOPHILIZED, FOR SOLUTION INTRAVENOUS at 05:07

## 2025-02-10 RX ADMIN — CALCIUM GLUCONATE 1 G: 20 INJECTION, SOLUTION INTRAVENOUS at 09:40

## 2025-02-10 RX ADMIN — PIPERACILLIN AND TAZOBACTAM 4.5 G: 36; 4.5 INJECTION, POWDER, LYOPHILIZED, FOR SOLUTION INTRAVENOUS at 12:33

## 2025-02-10 NOTE — PLAN OF CARE
Problem: Potential for Falls  Goal: Patient will remain free of falls  Description: INTERVENTIONS:  - Educate patient/family on patient safety including physical limitations  - Instruct patient to call for assistance with activity   - Consult OT/PT to assist with strengthening/mobility   - Keep Call bell within reach  - Keep bed low and locked with side rails adjusted as appropriate  - Keep care items and personal belongings within reach  - Initiate and maintain comfort rounds  - Make Fall Risk Sign visible to staff  - Offer Toileting every 2 Hours, in advance of need  - Initiate/Maintain Bed alarm  - Obtain necessary fall risk management equipment  - Apply yellow socks and bracelet for high fall risk patients  - Consider moving patient to room near nurses station  Outcome: Progressing     Problem: MUSCULOSKELETAL - ADULT  Goal: Maintain or return mobility to safest level of function  Description: INTERVENTIONS:  - Assess patient's ability to carry out ADLs; assess patient's baseline for ADL function and identify physical deficits which impact ability to perform ADLs (bathing, care of mouth/teeth, toileting, grooming, dressing, etc.)  - Assess/evaluate cause of self-care deficits   - Assess range of motion  - Assess patient's mobility  - Assess patient's need for assistive devices and provide as appropriate  - Encourage maximum independence but intervene and supervise when necessary  - Involve family in performance of ADLs  - Assess for home care needs following discharge   - Consider OT consult to assist with ADL evaluation and planning for discharge  - Provide patient education as appropriate  Outcome: Progressing     Problem: DISCHARGE PLANNING  Goal: Discharge to home or other facility with appropriate resources  Description: INTERVENTIONS:  - Identify barriers to discharge w/patient and caregiver  - Arrange for needed discharge resources and transportation as appropriate  - Identify discharge learning needs  (meds, wound care, etc.)  - Arrange for interpretive services to assist at discharge as needed  - Refer to Case Management Department for coordinating discharge planning if the patient needs post-hospital services based on physician/advanced practitioner order or complex needs related to functional status, cognitive ability, or social support system  Outcome: Progressing     Problem: Knowledge Deficit  Goal: Patient/family/caregiver demonstrates understanding of disease process, treatment plan, medications, and discharge instructions  Description: Complete learning assessment and assess knowledge base.  Interventions:  - Provide teaching at level of understanding  - Provide teaching via preferred learning methods  Outcome: Progressing     Problem: Prexisting or High Potential for Compromised Skin Integrity  Goal: Skin integrity is maintained or improved  Description: INTERVENTIONS:  - Identify patients at risk for skin breakdown  - Assess and monitor skin integrity  - Assess and monitor nutrition and hydration status  - Monitor labs   - Assess for incontinence   - Turn and reposition patient  - Assist with mobility/ambulation  - Relieve pressure over bony prominences  - Avoid friction and shearing  - Provide appropriate hygiene as needed including keeping skin clean and dry  - Evaluate need for skin moisturizer/barrier cream  - Collaborate with interdisciplinary team   - Patient/family teaching  - Consider wound care consult   Outcome: Progressing

## 2025-02-10 NOTE — PLAN OF CARE
Problem: Potential for Falls  Goal: Patient will remain free of falls  Description: INTERVENTIONS:  - Educate patient/family on patient safety including physical limitations  - Instruct patient to call for assistance with activity   - Consult OT/PT to assist with strengthening/mobility   - Keep Call bell within reach  - Keep bed low and locked with side rails adjusted as appropriate  - Keep care items and personal belongings within reach  - Initiate and maintain comfort rounds  - Make Fall Risk Sign visible to staff  - Offer Toileting every  Hours, in advance of need  - Initiate/Maintain bedalarm  - Obtain necessary fall risk management equipment: yellow sock  Problem: MUSCULOSKELETAL - ADULT  Goal: Maintain or return mobility to safest level of function  Description: INTERVENTIONS:  - Assess patient's ability to carry out ADLs; assess patient's baseline for ADL function and identify physical deficits which impact ability to perform ADLs (bathing, care of mouth/teeth, toileting, grooming, dressing, etc.)  - Assess/evaluate cause of self-care deficits   - Assess range of motion  - Assess patient's mobility  - Assess patient's need for assistive devices and provide as appropriate  - Encourage maximum independence but intervene and supervise when necessary  - Involve family in performance of ADLs  - Assess for home care needs following discharge   - Consider OT consult to assist with ADL evaluation and planning for discharge  - Provide patient education as appropriate  Reactivated     Problem: DISCHARGE PLANNING  Goal: Discharge to home or other facility with appropriate resources  Description: INTERVENTIONS:  - Identify barriers to discharge w/patient and caregiver  - Arrange for needed discharge resources and transportation as appropriate  - Identify discharge learning needs (meds, wound care, etc.)  - Arrange for interpretive services to assist at discharge as needed  - Refer to Case Management Department for  coordinating discharge planning if the patient needs post-hospital services based on physician/advanced practitioner order or complex needs related to functional status, cognitive ability, or social support system  Reactivated     Problem: Knowledge Deficit  Goal: Patient/family/caregiver demonstrates understanding of disease process, treatment plan, medications, and discharge instructions  Description: Complete learning assessment and assess knowledge base.  Interventions:  - Provide teaching at level of understanding  - Provide teaching via preferred learning methods  Reactivated     - Apply yellow socks and bracelet for high fall risk patients  - Consider moving patient to room near nurses station  2/9/2025 2027 by Azra Rea RN  Outcome: Progressing  2/9/2025 2026 by Azra Rea RN  Reactivated

## 2025-02-10 NOTE — PROGRESS NOTES
Progress Note - Surgery-General   Name: Duncan Sánchez 69 y.o. male I MRN: [id number]  Unit/Bed#: ICU 03 I Date of Admission: 2/9/2025   Date of Service: 2/10/2025 I Hospital Day: 1    Assessment & Plan  Diverticulitis  70yo M presenting with abdominal pain, nausea, diarrhea, subjective fever worsening over the last day. CT scan concerning for perforated R sided diverticulitis a/w TI and appendix inflammation.    Afebrile, HTN to 212/81, o2 sat Wnl on room air  UOP: 700cc (straight cath x1)    WBC 7.9 from 10.5  Hgb 8.7 from 10.2  Cr 0.53 from 0.73  LA 0.5    Plan  - NPO  - IV fluids  - Serial abdominal exams  - Continue IV abx: zosyn  - Strict I/O, consider valdez for accurate monitoring  - Monitor for worsening tachycardia, hypotension.  - Trend fever curve, WBC  - Low threshold for operative intervention  - prn analgesia and antiemetics  - dvt ppx: lovenox        Subjective   Patient seen and examined at bedside.  Pain controlled with as needed medication but continues to endorse severe and diffuse abdominal pain, more in RLQ. The pain is stable/same from prior exams. Denies nausea or vomiting.  Passing flatus but no bowel movement.  Has not voided spontaneously, requiring straight cath x 1 overnight.  Refusing straight cath this morning. Denies fever, chills, chest pain, shortness of breath.    Objective :  Temp:  [98.1 °F (36.7 °C)-100.1 °F (37.8 °C)] 98.1 °F (36.7 °C)  HR:  [] 82  BP: (103-212)/(55-81) 142/67  Resp:  [9-23] 12  SpO2:  [96 %-100 %] 98 %  O2 Device: None (Room air)    I/O         02/08 0701  02/09 0700 02/09 0701  02/10 0700    I.V. (mL/kg)  1377.1 (20.3)    IV Piggyback  1250    Total Intake(mL/kg)  2627.1 (38.7)    Urine (mL/kg/hr)  700    Total Output  700    Net  +1927.1                  Physical Exam  General: NAD  HENT: NCAT MMM  Neck: supple, no JVD  CV: nl rate  Lungs: nl wob. No resp distress  ABD: Soft, diffuse severe tenderness to palpation more pronounced in RLQ, nondistended.    Extrem: No CCE  Neuro: AAOx3      Lab Results: I have reviewed the following results:  Recent Labs     02/09/25  1312 02/09/25  1522 02/09/25  1946 02/10/25  0500   WBC 10.47*  --   --  7.90   HGB 10.2*  --   --  8.7*   HCT 31.3*  --   --  26.6*     --   --  155   BANDSPCT 31*  --   --   --    SODIUM 131*  --   --   --    K 3.8  --   --   --      --   --   --    CO2 22  --   --   --    BUN 17  --   --   --    CREATININE 0.73  --   --   --    GLUC 175*  --   --   --    MG 1.4*  --   --   --    AST 22  --   --   --    ALT 30  --   --   --    ALB 3.4*  --   --   --    TBILI 0.62  --   --   --    ALKPHOS 45  --   --   --    PTT 37*  --   --   --    INR 1.33*  --   --   --    HSTNI0 14  --   --   --    HSTNI2  --  13  --   --    LACTICACID 1.4  --  0.5  --              VTE Pharmacologic Prophylaxis: VTE covered by:  enoxaparin, Subcutaneous     VTE Mechanical Prophylaxis: sequential compression device

## 2025-02-10 NOTE — ASSESSMENT & PLAN NOTE
70yo M presenting with abdominal pain, nausea, diarrhea, subjective fever worsening over the last day. CT scan concerning for perforated R sided diverticulitis a/w TI and appendix inflammation.    Afebrile, HTN to 212/81, o2 sat Wnl on room air  UOP: 700cc (straight cath x1)    WBC 7.9 from 10.5  Hgb 8.7 from 10.2  Cr 0.53 from 0.73  LA 0.5    Plan  - NPO  - IV fluids  - Serial abdominal exams  - Continue IV abx: zosyn  - Strict I/O, consider valdez for accurate monitoring  - Monitor for worsening tachycardia, hypotension.  - Trend fever curve, WBC  - Low threshold for operative intervention  - prn analgesia and antiemetics  - dvt ppx: lovenox

## 2025-02-11 LAB
ANION GAP SERPL CALCULATED.3IONS-SCNC: 6 MMOL/L (ref 4–13)
BASOPHILS # BLD AUTO: 0.05 THOUSANDS/ΜL (ref 0–0.1)
BASOPHILS NFR BLD AUTO: 1 % (ref 0–1)
BUN SERPL-MCNC: 7 MG/DL (ref 5–25)
CA-I BLD-SCNC: 1.08 MMOL/L (ref 1.12–1.32)
CALCIUM SERPL-MCNC: 8.5 MG/DL (ref 8.4–10.2)
CHLORIDE SERPL-SCNC: 104 MMOL/L (ref 96–108)
CO2 SERPL-SCNC: 23 MMOL/L (ref 21–32)
CREAT SERPL-MCNC: 0.61 MG/DL (ref 0.6–1.3)
EOSINOPHIL # BLD AUTO: 0.09 THOUSAND/ΜL (ref 0–0.61)
EOSINOPHIL NFR BLD AUTO: 1 % (ref 0–6)
ERYTHROCYTE [DISTWIDTH] IN BLOOD BY AUTOMATED COUNT: 13.8 % (ref 11.6–15.1)
GFR SERPL CREATININE-BSD FRML MDRD: 100 ML/MIN/1.73SQ M
GLUCOSE SERPL-MCNC: 120 MG/DL (ref 65–140)
GLUCOSE SERPL-MCNC: 130 MG/DL (ref 65–140)
GLUCOSE SERPL-MCNC: 142 MG/DL (ref 65–140)
GLUCOSE SERPL-MCNC: 142 MG/DL (ref 65–140)
GLUCOSE SERPL-MCNC: 144 MG/DL (ref 65–140)
GLUCOSE SERPL-MCNC: 146 MG/DL (ref 65–140)
HCT VFR BLD AUTO: 30.8 % (ref 36.5–49.3)
HGB BLD-MCNC: 10.1 G/DL (ref 12–17)
IMM GRANULOCYTES # BLD AUTO: 0.03 THOUSAND/UL (ref 0–0.2)
IMM GRANULOCYTES NFR BLD AUTO: 0 % (ref 0–2)
LYMPHOCYTES # BLD AUTO: 0.64 THOUSANDS/ΜL (ref 0.6–4.47)
LYMPHOCYTES NFR BLD AUTO: 7 % (ref 14–44)
MAGNESIUM SERPL-MCNC: 1.9 MG/DL (ref 1.9–2.7)
MCH RBC QN AUTO: 29.2 PG (ref 26.8–34.3)
MCHC RBC AUTO-ENTMCNC: 32.8 G/DL (ref 31.4–37.4)
MCV RBC AUTO: 89 FL (ref 82–98)
MONOCYTES # BLD AUTO: 0.61 THOUSAND/ΜL (ref 0.17–1.22)
MONOCYTES NFR BLD AUTO: 7 % (ref 4–12)
NEUTROPHILS # BLD AUTO: 7.24 THOUSANDS/ΜL (ref 1.85–7.62)
NEUTS SEG NFR BLD AUTO: 84 % (ref 43–75)
NRBC BLD AUTO-RTO: 0 /100 WBCS
PHOSPHATE SERPL-MCNC: 2.9 MG/DL (ref 2.3–4.1)
PLATELET # BLD AUTO: 216 THOUSANDS/UL (ref 149–390)
PMV BLD AUTO: 10 FL (ref 8.9–12.7)
POTASSIUM SERPL-SCNC: 3.9 MMOL/L (ref 3.5–5.3)
RBC # BLD AUTO: 3.46 MILLION/UL (ref 3.88–5.62)
SODIUM SERPL-SCNC: 133 MMOL/L (ref 135–147)
WBC # BLD AUTO: 8.66 THOUSAND/UL (ref 4.31–10.16)

## 2025-02-11 PROCEDURE — 82948 REAGENT STRIP/BLOOD GLUCOSE: CPT

## 2025-02-11 PROCEDURE — 85027 COMPLETE CBC AUTOMATED: CPT

## 2025-02-11 PROCEDURE — 80048 BASIC METABOLIC PNL TOTAL CA: CPT

## 2025-02-11 PROCEDURE — 82330 ASSAY OF CALCIUM: CPT

## 2025-02-11 PROCEDURE — 84100 ASSAY OF PHOSPHORUS: CPT

## 2025-02-11 PROCEDURE — 97166 OT EVAL MOD COMPLEX 45 MIN: CPT

## 2025-02-11 PROCEDURE — 97162 PT EVAL MOD COMPLEX 30 MIN: CPT

## 2025-02-11 PROCEDURE — 99232 SBSQ HOSP IP/OBS MODERATE 35: CPT | Performed by: SPECIALIST

## 2025-02-11 PROCEDURE — 83735 ASSAY OF MAGNESIUM: CPT

## 2025-02-11 RX ORDER — MAGNESIUM SULFATE HEPTAHYDRATE 40 MG/ML
2 INJECTION, SOLUTION INTRAVENOUS ONCE
Status: COMPLETED | OUTPATIENT
Start: 2025-02-11 | End: 2025-02-11

## 2025-02-11 RX ORDER — CALCIUM GLUCONATE 20 MG/ML
2 INJECTION, SOLUTION INTRAVENOUS ONCE
Status: COMPLETED | OUTPATIENT
Start: 2025-02-11 | End: 2025-02-11

## 2025-02-11 RX ORDER — LABETALOL HYDROCHLORIDE 5 MG/ML
20 INJECTION, SOLUTION INTRAVENOUS EVERY 4 HOURS PRN
Status: DISCONTINUED | OUTPATIENT
Start: 2025-02-11 | End: 2025-02-19 | Stop reason: HOSPADM

## 2025-02-11 RX ORDER — HYDRALAZINE HYDROCHLORIDE 20 MG/ML
10 INJECTION INTRAMUSCULAR; INTRAVENOUS EVERY 4 HOURS PRN
Status: DISCONTINUED | OUTPATIENT
Start: 2025-02-11 | End: 2025-02-19 | Stop reason: HOSPADM

## 2025-02-11 RX ADMIN — ACETAMINOPHEN 1000 MG: 10 INJECTION INTRAVENOUS at 11:14

## 2025-02-11 RX ADMIN — SODIUM CHLORIDE, SODIUM GLUCONATE, SODIUM ACETATE, POTASSIUM CHLORIDE, MAGNESIUM CHLORIDE, SODIUM PHOSPHATE, DIBASIC, AND POTASSIUM PHOSPHATE 125 ML/HR: .53; .5; .37; .037; .03; .012; .00082 INJECTION, SOLUTION INTRAVENOUS at 09:15

## 2025-02-11 RX ADMIN — HYDRALAZINE HYDROCHLORIDE 10 MG: 20 INJECTION, SOLUTION INTRAMUSCULAR; INTRAVENOUS at 21:16

## 2025-02-11 RX ADMIN — PIPERACILLIN AND TAZOBACTAM 4.5 G: 36; 4.5 INJECTION, POWDER, LYOPHILIZED, FOR SOLUTION INTRAVENOUS at 21:25

## 2025-02-11 RX ADMIN — PIPERACILLIN AND TAZOBACTAM 4.5 G: 36; 4.5 INJECTION, POWDER, LYOPHILIZED, FOR SOLUTION INTRAVENOUS at 11:52

## 2025-02-11 RX ADMIN — MAGNESIUM SULFATE HEPTAHYDRATE 2 G: 40 INJECTION, SOLUTION INTRAVENOUS at 09:16

## 2025-02-11 RX ADMIN — LABETALOL HYDROCHLORIDE 10 MG: 5 INJECTION, SOLUTION INTRAVENOUS at 06:14

## 2025-02-11 RX ADMIN — PIPERACILLIN AND TAZOBACTAM 4.5 G: 36; 4.5 INJECTION, POWDER, LYOPHILIZED, FOR SOLUTION INTRAVENOUS at 06:13

## 2025-02-11 RX ADMIN — TAMSULOSIN HYDROCHLORIDE 0.4 MG: 0.4 CAPSULE ORAL at 09:16

## 2025-02-11 RX ADMIN — SODIUM CHLORIDE, SODIUM GLUCONATE, SODIUM ACETATE, POTASSIUM CHLORIDE, MAGNESIUM CHLORIDE, SODIUM PHOSPHATE, DIBASIC, AND POTASSIUM PHOSPHATE 75 ML/HR: .53; .5; .37; .037; .03; .012; .00082 INJECTION, SOLUTION INTRAVENOUS at 20:29

## 2025-02-11 RX ADMIN — CALCIUM GLUCONATE 2 G: 20 INJECTION, SOLUTION INTRAVENOUS at 09:16

## 2025-02-11 RX ADMIN — SODIUM CHLORIDE, SODIUM GLUCONATE, SODIUM ACETATE, POTASSIUM CHLORIDE, MAGNESIUM CHLORIDE, SODIUM PHOSPHATE, DIBASIC, AND POTASSIUM PHOSPHATE 125 ML/HR: .53; .5; .37; .037; .03; .012; .00082 INJECTION, SOLUTION INTRAVENOUS at 01:17

## 2025-02-11 RX ADMIN — ACETAMINOPHEN 1000 MG: 10 INJECTION INTRAVENOUS at 01:14

## 2025-02-11 RX ADMIN — LABETALOL HYDROCHLORIDE 20 MG: 5 INJECTION, SOLUTION INTRAVENOUS at 23:51

## 2025-02-11 RX ADMIN — ENOXAPARIN SODIUM 40 MG: 40 INJECTION SUBCUTANEOUS at 09:16

## 2025-02-11 RX ADMIN — ACETAMINOPHEN 1000 MG: 10 INJECTION INTRAVENOUS at 06:13

## 2025-02-11 RX ADMIN — LABETALOL HYDROCHLORIDE 10 MG: 5 INJECTION, SOLUTION INTRAVENOUS at 18:36

## 2025-02-11 RX ADMIN — LABETALOL HYDROCHLORIDE 10 MG: 5 INJECTION, SOLUTION INTRAVENOUS at 01:21

## 2025-02-11 NOTE — PLAN OF CARE
Problem: PHYSICAL THERAPY ADULT  Goal: Performs mobility at highest level of function for planned discharge setting.  See evaluation for individualized goals.  Description: Treatment/Interventions: Functional transfer training, LE strengthening/ROM, Elevations, Therapeutic exercise, Patient/family training, Equipment eval/education, Bed mobility, Gait training, Continued evaluation, Spoke to nursing, OT          See flowsheet documentation for full assessment, interventions and recommendations.  Note: Prognosis: Good     Assessment: Duncan Sánchez is a 69 y.o. male admitted to [facility] on 2/9/2025 for Diverticulitis. PT was consulted and pt was seen on 2/11/2025 for mobility assessment and d/c planning. Pt presents w high fall risk, multiple lines, language barrier requiring use of [employer]  (#89680). Per patient, he lives w his caregiver who provides A for ADLs, IADLs. He ambulates w a quad cane. Pt is currently functioning at a min A for bed mobility, S for transfers and ambulation. Pt demonstrated ability to ambulate unlimited household distances. No LOB ambulating without an AD or w use of cane. Encouraged the patient to continue to mobilize w the [facility] staff. He reports having issues w balance normally so encouraged the use of RW prn at home for improved stability.  Barriers to Discharge: None     Rehab Resource Intensity Level, PT: III (Minimum Resource Intensity) (OP PT for balance prn)    See flowsheet documentation for full assessment.

## 2025-02-11 NOTE — PHYSICAL THERAPY NOTE
"         PHYSICAL THERAPY EVALUATION          Patient Name: Duncan Sánchez  Today's Date: 2/11/2025 02/11/25 1055   PT Last Visit   PT Visit Date 02/11/25   Note Type   Note type Evaluation   Pain Assessment   Pain Assessment Tool 0-10   Pain Score No Pain   Restrictions/Precautions   Other Precautions Chair Alarm;Bed Alarm;Multiple lines;Telemetry;Fall Risk   Home Living   Type of Home House   Home Layout Bed/bath upstairs   Home Equipment Walker;Quad cane   Prior Function   Level of [place] Needs assistance with ADLs;Needs assistance with IADLS;Independent with functional mobility   Lives With Other (Comment)  (caregiver)   Receives Help From Personal care attendant  (reports 56 hrs/wk)   Comments per patient, he lives w a \"lady\" who is his caregiver. she helps him w everything at home. he uses the quad cane to walk   General   Additional Pertinent History pt admitted 2/9/25 for diverticulitis. activity as tolerated orders. PMHx significant for overweight, T2DM, CVA, neuropathy   Cognition   Overall Cognitive Status WFL   Arousal/Participation Cooperative   Orientation Level Oriented to person;Oriented to place;Oriented to time   Following Commands Follows one step commands without difficulty   Bed Mobility   Supine to Sit 4  Minimal assistance   Additional items Increased time required;Assist x 1;Other  (trunk support)   Sit to Supine 4  Minimal assistance   Additional items Assist x 1;LE management   Transfers   Sit to Stand 5  Supervision   Stand to Sit 5  Supervision   Ambulation/Elevation   Gait pattern Improper Weight shift;Wide CHARLA;Short stride;Excessively slow   Gait Assistance 5  Supervision   Additional items Assist x 1   Assistive Device None;Small base quad cane   Distance 5x2' no AD then 100' (10' w no AD, 90' w RW)   Balance   Static Standing Fair   Dynamic Standing Fair -   Ambulatory Fair -   Endurance Deficit   Endurance Deficit No  (vitals during session 160/70, 95, 99% on RA at rest. " 173/79, 91, 99% on RA post amb)   Activity Tolerance   Activity Tolerance Patient tolerated treatment well   Medical Staff Made Aware Majo OT   Nurse Made Aware Natividad RN   Assessment   Prognosis Good   Assessment Duncan Sánchez is a 69 y.o. male admitted to [facility] on 2/9/2025 for Diverticulitis. PT was consulted and pt was seen on 2/11/2025 for mobility assessment and d/c planning. Pt presents w high fall risk, multiple lines, language barrier requiring use of [employer]  (#[id number]). Per patient, he lives w his caregiver who provides A for ADLs, IADLs. He ambulates w a quad cane. Pt is currently functioning at a min A for bed mobility, S for transfers and ambulation. Pt demonstrated ability to ambulate unlimited household distances. No LOB ambulating without an AD or w use of cane. Encouraged the patient to continue to mobilize w the [facility] staff. He reports having issues w balance normally so encouraged the use of RW prn at home for improved stability.   Barriers to Discharge None   Goals   STG Expiration Date 02/18/25   Short Term Goal #1 1) Pt will perform bed mobility mod I demonstrating appropriate technique 100% of the time in order to improve function. 2) Pt will perform all transfers mod I demonstrating safe technique 100% of the time in order to improve ability to negotiate safely in home environment. 3) Amb with least restrictive AD > 150'x1 with mod I in order to demonstrate ability to negotiate in home environment. 4) Improve overall strength and balance 1/2 grade in order to optimize ability to perform functional tasks and reduce fall risk. 5) Improve am-pac by 2 to demonstrate functional progress and return to baseline function/reduced caregiver burden. 6) Negotiate stairs using most appropriate technique and S in order to be able to negotiate safely in home environment.   Plan   Treatment/Interventions Functional transfer training;LE strengthening/ROM;Elevations;Therapeutic  exercise;Patient/family training;Equipment eval/education;Bed mobility;Gait training;Continued evaluation;Spoke to nursing;OT   PT Frequency Other (Comment)  (1-2 f/u)   Discharge Recommendation   Rehab Resource Intensity Level, PT III (Minimum Resource Intensity)  (OP PT for balance prn)   AM-PAC Basic Mobility Inpatient   Turning in Flat Bed Without Bedrails 4   Lying on Back to Sitting on Edge of Flat Bed Without Bedrails 3   Moving Bed to Chair 3   Standing Up From Chair Using Arms 3   Walk in Room 3   Climb 3-5 Stairs With Railing 3   Basic Mobility Inpatient Raw Score 19   Basic Mobility Standardized Score 42.48   [facility] Highest Level Of Mobility   -HLM Goal 6: Walk 10 steps or more   -HLM Achieved 7: Walk 25 feet or more   End of Consult   Patient Position at End of Consult Supine;All needs within reach     History: co - morbidities including age, use of assistive device, assist for adl's, current experience including fall risk, multiple lines  Exam: impairments in systems including multiple body structures involved; neuromuscular (balance, gait, transfers), cardiopulmonary (vitals), cognition; activity limitations  (difficulties executing an action); participation restrictions (problems associated w involvement in life situations), AM-PAC  Clinical: stable/unpredictable  Complexity: moderate    Sue Turner, PT

## 2025-02-11 NOTE — PLAN OF CARE
Problem: OCCUPATIONAL THERAPY ADULT  Goal: Performs self-care activities at highest level of function for planned discharge setting.  See evaluation for individualized goals.  Description: Treatment Interventions: ADL retraining, Functional transfer training, Endurance training, Patient/family training, Cognitive reorientation, Neuromuscular reeducation, Fine motor coordination activities, Energy conservation, Activityengagement          See flowsheet documentation for full assessment, interventions and recommendations.   Note: Limitation: Decreased ADL status, Decreased UE strength, Decreased Safe judgement during ADL, Decreased endurance, Decreased self-care trans, Decreased high-level ADLs  Prognosis: Good  Assessment: Duncan Sánchez is a 69 y.o. male seen for OT evaluation s/p admit to [facility] on 2/9/2025 w/ Diverticulitis.  Comorbidities affecting pt's functional performance at time of assessment include: DM and HTN. Pt with active OT orders and activity orders for Up and OOB as tolerated. Personal factors affecting pt at time of IE include:[place] home environment, limited home support, difficulty performing ADLs, difficulty performing IADLs, and difficulty performing transfers/mobility. Prior to admission, pt lives alone in a home on the 2nd floor. 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 Owns a RW. Upon evaluation: Pt currently requires supervision for UB ADLs, [place] for LB ADLs, supervision for toileting, [place] for bed mobility, supervision for functional transfers, and supervision cane mobility 2* the following deficits impacting occupational performance:weakness, decreased strength , decreased balance, and decreased activity tolerance. VITALS during session: 160/70.  Pt to benefit from continued skilled OT tx while in the hospital to address deficits as defined above and maximize level of functional independence w ADL's and functional mobility. Occupational Performance areas to address  include: grooming, bathing/shower, toilet hygiene, dressing, functional mobility, and clothing management. From OT standpoint, recommendation at time of d/c would be level 3 resources. OT to follow pt on caseload 2-3x/wk.     Rehab Resource Intensity Level, OT: III (Minimum Resource Intensity)

## 2025-02-11 NOTE — ASSESSMENT & PLAN NOTE
69-year-old male with right sided perforated diverticulitis managed conservatively.  Patient with asymptomatic hypertension overnight.  Labetalol dose increased to 20 mg.  Hydralazine dose increased to 10 mg.  Will continue to monitor and treat as appropriate.    Vitals: AVSS on room air.  /62.  UOP: 1650 cc  WBC 11.17 from 8.66  Hgb 9.1 from 10.1      Plan  - Advance diet to fulls, toast and crackers  - Maintenance IV fluids  - Serial abdominal exams  - Continue IV abx: zosyn  - Strict I/O, consider valdez for accurate monitoring  - Continue SBP management, adjust as needed  - Trend fever curve, WBC  - Low threshold for operative intervention  - prn analgesia and antiemetics  - dvt ppx: lovenox

## 2025-02-11 NOTE — OCCUPATIONAL THERAPY NOTE
Occupational Therapy Evaluation     Patient Name: Duncan Sánchez  Today's Date: 2/11/2025  Problem List  Principal Problem:    Diverticulitis    Past Medical History  Past Medical History:   Diagnosis Date    Benign prostatic hyperplasia 08/10/2024    History of CVA (cerebrovascular accident) 08/06/2024    Hyperlipidemia 08/06/2024    Neuropathy 09/23/2024    Primary hypertension 08/06/2024    Type 2 diabetes mellitus without complication, without long-term current use of insulin (HCC) 08/06/2024     Past Surgical History  History reviewed. No pertinent surgical history.        02/11/25 1041   OT Last Visit   OT Visit Date 02/11/25   Note Type   Note type Evaluation   Additional Comments greeted in supine and agreeable. 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)   Receives Help From Personal care attendant   IADLs Family/Friend/Other provides transportation;Family/Friend/Other provides meals;Family/Friend/Other provides medication management   ADL   Where Assessed Edge of bed   Eating Assistance 5  Supervision/Setup   Grooming Assistance 5  Supervision/Setup   UB Bathing Assistance 5  Supervision/Setup   LB Bathing Assistance 4  Minimal Assistance   UB Dressing Assistance 5  Supervision/Setup   LB Dressing Assistance 4  Minimal Assistance   Toileting Assistance  5  Supervision/Setup   Bed Mobility   Supine to Sit 4  Minimal assistance   Additional items Assist x  1;HOB elevated;Bedrails   Sit to Supine 4  Minimal assistance   Additional items Assist x 1;LE management   Transfers   Sit to Stand 5  Supervision   Stand to Sit 5  Supervision   Functional Mobility   Functional Mobility 5  Supervision   Additional Comments Ax1, quad cane. functional distances.   Balance   Static Sitting Good   Dynamic Sitting Fair +   Static Standing Fair   Dynamic Standing Fair -   Ambulatory Fair -   Activity Tolerance   Activity Tolerance Patient tolerated treatment well   Medical Staff Made Aware PT Sue   Nurse Made Aware TOMMY Henson   RUKEM Assessment   RUE Assessment WFL   LUE Assessment   LUE Assessment WFL   Hand Function   Gross Motor Coordination Functional   Fine Motor Coordination Functional   Psychosocial   Psychosocial (WDL) WDL   Cognition   Overall Cognitive Status WFL   Arousal/Participation Cooperative   Attention Within functional limits   Orientation Level Oriented to person;Oriented to place;Oriented to time;Oriented to situation   Memory Within functional limits   Following Commands Follows one step commands without difficulty   Comments cooperative.   Assessment   Limitation Decreased ADL status;Decreased UE strength;Decreased Safe judgement during ADL;Decreased endurance;Decreased self-care trans;Decreased high-level ADLs   Prognosis Good   Assessment Duncan Sánchez is a 69 y.o. male seen for OT evaluation s/p admit to [facility] on 2/9/2025 w/ Diverticulitis.  Comorbidities affecting pt's functional performance at time of assessment include: DM and HTN. Pt with active OT orders and activity orders for Up and OOB as tolerated. Personal factors affecting pt at time of IE include:[place] home environment, limited home support, difficulty performing ADLs, difficulty performing IADLs, and difficulty performing transfers/mobility. Prior to admission, pt lives alone in a home on the 2nd floor. 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 Owns a RW. Upon evaluation:  Pt currently requires supervision for UB ADLs, [place] for LB ADLs, supervision for toileting, [place] for bed mobility, supervision for functional transfers, and supervision cane mobility 2* the following deficits impacting occupational performance:weakness, decreased strength , decreased balance, and decreased activity tolerance. VITALS during session: 160/70.  Pt to benefit from continued skilled OT tx while in the hospital to address deficits as defined above and maximize level of functional independence w ADL's and functional mobility. Occupational Performance areas to address include: grooming, bathing/shower, toilet hygiene, dressing, functional mobility, and clothing management. From OT standpoint, recommendation at time of d/c would be level 3 resources. OT to follow pt on caseload 2-3x/wk.   Goals   STG Time Frame 3-5   Short Term Goal #1 Pt will improve activity tolerance to G for min 30 min txment sessions for increase engagement in functional tasks   Short Term Goal #2 Pt will complete bed mobility at a Mod I level w/ G balance/safety demonstrated to decrease caregiver assistance required   Short Term Goal  Pt will complete LB dressing/self care w/ mod I using adaptive device and DME as needed   LTG Time Frame 10-14   Long Term Goal #1 Pt will complete toileting w/ mod I w/ G hygiene/thoroughness using DME as needed   Long Term Goal #2 Pt will improve functional transfers to Mod I on/off all surfaces using DME as needed w/ G balance/safety   Long Term Goal Pt will improve functional mobility during ADL/IADL/leisure tasks to Mod I using DME as needed w/ G balance/safety   Plan   Treatment Interventions ADL retraining;Functional transfer training;Endurance training;Patient/family training;Cognitive reorientation;Neuromuscular reeducation;Fine motor coordination activities;Energy conservation;Activityengagement   Goal Expiration Date 02/25/25   OT Treatment Day 0   OT Frequency 2-3x/wk   Discharge Recommendation    Rehab Resource Intensity Level, OT III (Minimum Resource Intensity)   AM-PAC Daily Activity Inpatient   Lower Body Dressing 3   Bathing 3   Toileting 3   Upper Body Dressing 3   Grooming 4   Eating 4   Daily Activity Raw Score 20   Daily Activity Standardized Score (Calc for Raw Score >=11) 42.03   AM-PAC Applied Cognition Inpatient   Following a Speech/Presentation 4   Understanding Ordinary Conversation 4   Taking Medications 3   Remembering Where Things Are Placed or Put Away 3   Remembering List of 4-5 Errands 2   Taking Care of Complicated Tasks 2   Applied Cognition Raw Score 18   Applied Cognition Standardized Score 38.07   Erika Farah, OT

## 2025-02-11 NOTE — PLAN OF CARE
Problem: DISCHARGE PLANNING  Goal: Discharge to home or other facility with appropriate resources  Description: INTERVENTIONS:  - Identify barriers to discharge w/patient and caregiver  - Arrange for needed discharge resources and transportation as appropriate  - Identify discharge learning needs (meds, wound care, etc.)  - Arrange for interpretive services to assist at discharge as needed  - Refer to Case Management Department for coordinating discharge planning if the patient needs post-hospital services based on physician/advanced practitioner order or complex needs related to functional status, cognitive ability, or social support system  Outcome: Progressing     Problem: Knowledge Deficit  Goal: Patient/family/caregiver demonstrates understanding of disease process, treatment plan, medications, and discharge instructions  Description: Complete learning assessment and assess knowledge base.  Interventions:  - Provide teaching at level of understanding  - Provide teaching via preferred learning methods  Outcome: Progressing     Problem: Prexisting or High Potential for Compromised Skin Integrity  Goal: Skin integrity is maintained or improved  Description: INTERVENTIONS:  - Identify patients at risk for skin breakdown  - Assess and monitor skin integrity  - Assess and monitor nutrition and hydration status  - Monitor labs   - Assess for incontinence   - Turn and reposition patient  - Assist with mobility/ambulation  - Relieve pressure over bony prominences  - Avoid friction and shearing  - Provide appropriate hygiene as needed including keeping skin clean and dry  - Evaluate need for skin moisturizer/barrier cream  - Collaborate with interdisciplinary team   - Patient/family teaching  - Consider wound care consult   Outcome: Progressing     Problem: MUSCULOSKELETAL - ADULT  Goal: Maintain or return mobility to safest level of function  Description: INTERVENTIONS:  - Assess patient's ability to carry out ADLs;  10/25/20 0747   Vital Signs   Temp 97 °F (36.1 °C)   Temp Source Oral   Pulse 64   Resp 18   BP (!) 168/86   BP Location Left upper arm   Patient Position Up in chair   Level of Consciousness 0   MEWS Score 1   Oxygen Therapy   SpO2 95 %   O2 Device Nasal cannula   O2 Flow Rate (L/min) 2 L/min       Vitals as above this am. Patient states she feels much better today then she did yesterday. Medications given per order. Patient seen sitting up in chair at this time. No signs or symptoms of distress noted. Patient's chair alarm in place, droplet plus precautions maintained. assess patient's baseline for ADL function and identify physical deficits which impact ability to perform ADLs (bathing, care of mouth/teeth, toileting, grooming, dressing, etc.)  - Assess/evaluate cause of self-care deficits   - Assess range of motion  - Assess patient's mobility  - Assess patient's need for assistive devices and provide as appropriate  - Encourage maximum independence but intervene and supervise when necessary  - Involve family in performance of ADLs  - Assess for home care needs following discharge   - Consider OT consult to assist with ADL evaluation and planning for discharge  - Provide patient education as appropriate  Outcome: Progressing     Problem: Potential for Falls  Goal: Patient will remain free of falls  Description: INTERVENTIONS:  - Educate patient/family on patient safety including physical limitations  - Instruct patient to call for assistance with activity   - Consult OT/PT to assist with strengthening/mobility   - Keep Call bell within reach  - Keep bed low and locked with side rails adjusted as appropriate  - Keep care items and personal belongings within reach  - Initiate and maintain comfort rounds  - Make Fall Risk Sign visible to staff  - Offer Toileting every 2 Hours, in advance of need  - Initiate/Maintain bedalarm  - Obtain necessary fall risk management equipment: yellow sock  - Apply yellow socks and bracelet for high fall risk patients  - Consider moving patient to room near nurses station  Outcome: Progressing

## 2025-02-11 NOTE — PROGRESS NOTES
"Progress Note - Surgery-General   Name: Duncan Sánchez 69 y.o. male I MRN: [id number]  Unit/Bed#: [place] [address] -01 I Date of Admission: 2/9/2025   Date of Service: 2/11/2025 I Hospital Day: 2     Assessment & Plan  Diverticulitis  69-year-old male with right sided perforated diverticulitis managed conservatively.  Patient with asymptomatic hypertension overnight.  Labetalol dose increased to 20 mg.  Hydralazine dose increased to 10 mg.  Will continue to monitor and treat as appropriate.    Vitals: AVSS on room air.  /62.  UOP: 1650 cc  WBC 11.17 from 8.66  Hgb 9.1 from 10.1      Plan  - Advance diet to fulls, toast and crackers  - Maintenance IV fluids  - Serial abdominal exams  - Continue IV abx: zosyn  - Strict I/O, consider valdez for accurate monitoring  - Continue SBP management, adjust as needed  - Trend fever curve, WBC  - Low threshold for operative intervention  - prn analgesia and antiemetics  - dvt ppx: lovenox    24 Hour Events : Asymptomatic HTN overnight as mentioned above. Currently, return to baseline     Subjective : Patient continues to have intermittent abdominal pain throughout the night.  He denies any nausea, vomiting, fevers or chills overnight.  Patient endorses having diarrhea and gas.    Objective :  Visit Vitals  /62   Pulse 79   Temp 99.6 °F (37.6 °C)   Resp 16   Ht 5' 1\" (1.549 m)   Wt 67.8 kg (149 lb 7.6 oz)   SpO2 95%   BMI 28.24 kg/m²   Smoking Status Never   BSA 1.67 m²        Physical Exam  Constitutional:       General: He is not in acute distress.     Appearance: Normal appearance. He is not ill-appearing, toxic-appearing or diaphoretic.   Cardiovascular:      Rate and Rhythm: Normal rate and regular rhythm.      Pulses: Normal pulses.      Heart sounds: Normal heart sounds. No murmur heard.  Pulmonary:      Effort: Pulmonary effort is normal.      Breath sounds: Normal breath sounds.   Abdominal:      General: There is distension (Diffuse abdominal tenderness " throughout the abdomen.  No guarding or rigidity.).      Palpations: Abdomen is soft. There is no mass.      Tenderness: There is abdominal tenderness. There is no right CVA tenderness, left CVA tenderness, guarding or rebound.      Hernia: No hernia is present.   Skin:     General: Skin is warm and dry.   Neurological:      General: No focal deficit present.      Mental Status: He is alert and oriented to person, place, and time.         Lab Results: I have reviewed the following results:  Recent Labs     02/09/25  1312 02/10/25  0500 02/11/25  0603 02/12/25  0457   WBC 10.47* 7.90 8.66 11.17*   HGB 10.2* 8.7* 10.1* 9.1*    155 216 246   SODIUM 131* 133* 133*  --    K 3.8 4.5 3.9  --     101 104  --    CO2 22 18* 23  --    BUN 17 8 7  --    CREATININE 0.73 0.53* 0.61  --    GLUC 175* 103 142*  --    CALCIUM 9.0 6.4* 8.5  --    MG 1.4* 2.5 1.9  --    PHOS  --  2.8 2.9  --    AST 22  --   --   --    ALT 30  --   --   --    ALKPHOS 45  --   --   --    TBILI 0.62  --   --   --           VTE Pharmacologic Prophylaxis: VTE covered by:  enoxaparin, Subcutaneous, 40 mg at 02/11/25 0916     VTE Mechanical Prophylaxis: sequential compression device

## 2025-02-11 NOTE — PROGRESS NOTES
"Progress Note - Surgery-General   Name: Duncan Sánchez 69 y.o. male I MRN: [id number]  Unit/Bed#: ICU 03 I Date of Admission: 2/9/2025   Date of Service: 2/10/2025 I Hospital Day: 1     Assessment & Plan  Diverticulitis  69-year-old male with right sided perforated diverticulitis managed conservatively.    Vitals: AVSS on room air  UOP: 2525 cc    WBC 8.66 from 7.9  Hgb 10.1 from 8.7  Cr 0.61 from 0.53    Plan  - NPO  - IV fluids  - Serial abdominal exams  - Continue IV abx: zosyn  - Strict I/O, consider valdez for accurate monitoring  - Monitor for worsening tachycardia, hypotension.  - Trend fever curve, WBC  - Low threshold for operative intervention  - prn analgesia and antiemetics  - dvt ppx: lovenox    24 Hour Events : Intermittent, asymptomatic, hypertension overnight. Managed appropriately.  Otherwise, no acute overnight events  Subjective : This morning, patient still complains of moderate amount of abdominal pain located in the central area.  He denies passing gas or having bowel movements overnight.  Patient complains of bloating.  He denies any nausea, vomiting, fevers or chills overnight.    Objective :  Visit Vitals  /63   Pulse 91   Temp 99.2 °F (37.3 °C) (Oral)   Resp 20   Ht 5' 1\" (1.549 m)   Wt 67.8 kg (149 lb 7.6 oz)   SpO2 96%   BMI 28.24 kg/m²   Smoking Status Never   BSA 1.67 m²        Physical Exam  Constitutional:       General: He is not in acute distress.     Appearance: Normal appearance. He is not ill-appearing, toxic-appearing or diaphoretic.   Cardiovascular:      Rate and Rhythm: Normal rate and regular rhythm.      Pulses: Normal pulses.      Heart sounds: Normal heart sounds.   Pulmonary:      Effort: Pulmonary effort is normal. No respiratory distress.      Breath sounds: Normal breath sounds.   Abdominal:      General: There is distension.      Palpations: Abdomen is soft. There is no mass.      Tenderness: There is abdominal tenderness (Diffuse abdominal tenderness to " palpation.  Some tympany.). There is no right CVA tenderness, left CVA tenderness, guarding or rebound.      Hernia: No hernia is present.   Neurological:      Mental Status: He is alert.         Lab Results: I have reviewed the following results:  Recent Labs     02/09/25  1312 02/10/25  0500 02/11/25  0603   WBC 10.47* 7.90 8.66   HGB 10.2* 8.7* 10.1*    155 216   SODIUM 131* 133* 133*   K 3.8 4.5 3.9    101 104   CO2 22 18* 23   BUN 17 8 7   CREATININE 0.73 0.53* 0.61   GLUC 175* 103 142*   CALCIUM 9.0 6.4* 8.5   MG 1.4* 2.5 1.9   PHOS  --  2.8 2.9   AST 22  --   --    ALT 30  --   --    ALKPHOS 45  --   --    TBILI 0.62  --   --           VTE Pharmacologic Prophylaxis: VTE covered by:  enoxaparin, Subcutaneous, 40 mg at 02/10/25 0834     VTE Mechanical Prophylaxis: sequential compression device

## 2025-02-11 NOTE — CASE MANAGEMENT
Case Management Assessment & Discharge Planning Note    Patient name Duncan Sánchez  Location ICU 03/ICU 03 MRN [id number]  : 1955 Date 2025       Current Admission Date: 2025  Current Admission Diagnosis:Diverticulitis   Patient Active Problem List    Diagnosis Date Noted Date Diagnosed    Diverticulitis 2025     Acute left-sided low back pain without sciatica 2024     Balance problem 2024     Chronic pain of both shoulders 2024     Neuropathy 2024     Benign prostatic hyperplasia 08/10/2024     Primary hypertension 2024     Hyperlipidemia 2024     Type 2 diabetes mellitus without complication, without long-term current use of insulin (HCC) 2024     History of CVA (cerebrovascular accident) 2024       LOS (days): 2  Geometric Mean LOS (GMLOS) (days): 2.5  Days to GMLOS:0.6     OBJECTIVE:    Risk of Unplanned Readmission Score: 11.96         Current admission status: Inpatient     Preferred Pharmacy:   [id number] [place] - [address]  [address]  [place] [address]  Phone: [phone] Fax: [phone]    Primary Care Provider: Mena Alexis PA-C    Primary Insurance: MEDICARE  Secondary Insurance: [employer] [facility]    ASSESSMENT:  Active Health Care Proxies       Nichelle Srinivasan Health Care Representative - Care Giver   Primary Phone: [phone] (Mobile)                 Advance Directives  Does patient have a Health Care POA?: No  Was patient offered paperwork?: No  Does patient currently have a Health Care decision maker?: Yes, please see Health Care Proxy section  Does patient have Advance Directives?: No  Was patient offered paperwork?: No  Primary Contact: Nichelle Srinivasan (caregiver) [phone]       Readmission Root Cause  30 Day Readmission: No    Patient Information  Admitted from:: Home  Mental Status: Alert  During Assessment patient was accompanied by: Not accompanied  during assessment  Assessment information provided by:: Patient (Assisted by  # [id number])  Primary Caregiver: Self  Support Systems: Self (Care biver Nichlele)  County of Residence: [place]  What city do you live in?: [place]  Home entry access options. Select all that apply.: Stairs  Number of steps to enter home.: 2  Do the steps have railings?: Yes  Type of Current Residence: 2 story home  Upon entering residence, is there a bedroom on the main floor (no further steps)?: Yes  Upon entering residence, is there a bathroom on the main floor (no further steps)?: Yes  Living Arrangements: Other (Comment) (Lives with caregiver Nichelle)  Is patient a ?: No    Activities of Daily Living Prior to Admission  Functional Status: Independent  Completes ADLs independently?: Yes  Does patient use assisted devices?: Yes  Assisted Devices (DME) used: Straight Cane  Does patient currently own DME?: Yes  What DME does the patient currently own?: Straight Cane  Does patient have a history of Outpatient Therapy (PT/OT)?: No  Does the patient have a history of Short-Term Rehab?: No  Does patient have a history of HHC?: No  Does patient currently have HHC?: No       Patient Information Continued  Income Source: SSI/SSD  Does patient have prescription coverage?: Yes  Does patient receive dialysis treatments?: No  Does patient have a history of substance abuse?: No  Does patient have a history of Mental Health Diagnosis?: No       Means of Transportation  Means of Transport to [facility]:: Other (Comment) (Caregiver)      Social Determinants of Health (SDOH)      Flowsheet Row Most Recent Value   Housing Stability    In the last 12 months, was there a time when you were not able to pay the mortgage or rent on time? N  [Aided by  # [id number]]   In the past 12 months, how many times have you moved where you were living? 0   At any time in the past 12 months, were you homeless or living in a shelter  (including now)? N   Transportation Needs    In the past 12 months, has lack of transportation kept you from medical appointments or from getting medications? no   In the past 12 months, has lack of transportation kept you from meetings, work, or from getting things needed for daily living? No   Food Insecurity    Within the past 12 months, you worried that your food would run out before you got the money to buy more. Never true   Within the past 12 months, the food you bought just didn't last and you didn't have money to get more. Never true   Utilities    In the past 12 months has the electric, gas, oil, or water company threatened to shut off services in your home? No            DISCHARGE DETAILS:    Discharge planning discussed with:: patient, assisted by  # [id number]  Freedom of Choice: Yes  Comments - Freedom of Choice: home with OP follow up  CM contacted family/caregiver?: No- see comments  Were Treatment Team discharge recommendations reviewed with patient/caregiver?: Yes  Did patient/caregiver verbalize understanding of patient care needs?: Yes  Were patient/caregiver advised of the risks associated with not following Treatment Team discharge recommendations?: Yes    Contacts  Patient Contacts: Nichelle Srinivasan (caregiver) [phone]  Relationship to Patient:: Other (Comment) (Caregiver)     Would you like to participate in our [facility] service program?  : No - Declined    Treatment Team Recommendation: Home with Home Health Care  Discharge Destination Plan:: Home with Home Health Care         Additional Comments: CM met with the patient at the bedside for intake assessment and discharge planning. CM was assisted by  # [id number] CM introduced self and reviewed role. The patient informed care manager that he lives with his caregiver Nichelle who assists him with everything. PT recommendation puts the patient at a Minimum Intensity level III. Patient agrees to  outpatient PT, does not wish to have home PT at the moment. CM will provide the patient with a list of SL PT facilities and will continue to follow the patient for discharge planning should he need surgery.

## 2025-02-11 NOTE — ASSESSMENT & PLAN NOTE
69-year-old male with right sided perforated diverticulitis managed conservatively.    Vitals: AVSS on room air  UOP: 2525 cc    WBC 8.66 from 7.9  Hgb 10.1 from 8.7  Cr 0.61 from 0.53    Plan  - NPO  - IV fluids  - Serial abdominal exams  - Continue IV abx: zosyn  - Strict I/O, consider valdez for accurate monitoring  - Monitor for worsening tachycardia, hypotension.  - Trend fever curve, WBC  - Low threshold for operative intervention  - prn analgesia and antiemetics  - dvt ppx: lovenox

## 2025-02-12 LAB
ANION GAP SERPL CALCULATED.3IONS-SCNC: 11 MMOL/L (ref 4–13)
BASOPHILS # BLD AUTO: 0.07 THOUSANDS/ΜL (ref 0–0.1)
BASOPHILS NFR BLD AUTO: 1 % (ref 0–1)
BUN SERPL-MCNC: 6 MG/DL (ref 5–25)
CA-I BLD-SCNC: 1.11 MMOL/L (ref 1.12–1.32)
CALCIUM SERPL-MCNC: 8.2 MG/DL (ref 8.4–10.2)
CHLORIDE SERPL-SCNC: 102 MMOL/L (ref 96–108)
CO2 SERPL-SCNC: 21 MMOL/L (ref 21–32)
CREAT SERPL-MCNC: 0.53 MG/DL (ref 0.6–1.3)
EOSINOPHIL # BLD AUTO: 0.1 THOUSAND/ΜL (ref 0–0.61)
EOSINOPHIL NFR BLD AUTO: 1 % (ref 0–6)
ERYTHROCYTE [DISTWIDTH] IN BLOOD BY AUTOMATED COUNT: 13.8 % (ref 11.6–15.1)
GFR SERPL CREATININE-BSD FRML MDRD: 106 ML/MIN/1.73SQ M
GLUCOSE SERPL-MCNC: 106 MG/DL (ref 65–140)
GLUCOSE SERPL-MCNC: 108 MG/DL (ref 65–140)
GLUCOSE SERPL-MCNC: 124 MG/DL (ref 65–140)
GLUCOSE SERPL-MCNC: 166 MG/DL (ref 65–140)
HCT VFR BLD AUTO: 27.6 % (ref 36.5–49.3)
HGB BLD-MCNC: 9.1 G/DL (ref 12–17)
IMM GRANULOCYTES # BLD AUTO: 0.07 THOUSAND/UL (ref 0–0.2)
IMM GRANULOCYTES NFR BLD AUTO: 1 % (ref 0–2)
LYMPHOCYTES # BLD AUTO: 1.4 THOUSANDS/ΜL (ref 0.6–4.47)
LYMPHOCYTES NFR BLD AUTO: 13 % (ref 14–44)
MAGNESIUM SERPL-MCNC: 1.8 MG/DL (ref 1.9–2.7)
MCH RBC QN AUTO: 29 PG (ref 26.8–34.3)
MCHC RBC AUTO-ENTMCNC: 33 G/DL (ref 31.4–37.4)
MCV RBC AUTO: 88 FL (ref 82–98)
MONOCYTES # BLD AUTO: 0.8 THOUSAND/ΜL (ref 0.17–1.22)
MONOCYTES NFR BLD AUTO: 7 % (ref 4–12)
NEUTROPHILS # BLD AUTO: 8.73 THOUSANDS/ΜL (ref 1.85–7.62)
NEUTS SEG NFR BLD AUTO: 77 % (ref 43–75)
NRBC BLD AUTO-RTO: 0 /100 WBCS
PHOSPHATE SERPL-MCNC: 2.6 MG/DL (ref 2.3–4.1)
PLATELET # BLD AUTO: 246 THOUSANDS/UL (ref 149–390)
PMV BLD AUTO: 10.3 FL (ref 8.9–12.7)
POTASSIUM SERPL-SCNC: 3.4 MMOL/L (ref 3.5–5.3)
RBC # BLD AUTO: 3.14 MILLION/UL (ref 3.88–5.62)
SODIUM SERPL-SCNC: 134 MMOL/L (ref 135–147)
WBC # BLD AUTO: 11.17 THOUSAND/UL (ref 4.31–10.16)

## 2025-02-12 PROCEDURE — 97530 THERAPEUTIC ACTIVITIES: CPT

## 2025-02-12 PROCEDURE — 85025 COMPLETE CBC W/AUTO DIFF WBC: CPT

## 2025-02-12 PROCEDURE — 82948 REAGENT STRIP/BLOOD GLUCOSE: CPT

## 2025-02-12 PROCEDURE — 80048 BASIC METABOLIC PNL TOTAL CA: CPT

## 2025-02-12 PROCEDURE — 84100 ASSAY OF PHOSPHORUS: CPT

## 2025-02-12 PROCEDURE — 82330 ASSAY OF CALCIUM: CPT

## 2025-02-12 PROCEDURE — 83735 ASSAY OF MAGNESIUM: CPT

## 2025-02-12 PROCEDURE — 99232 SBSQ HOSP IP/OBS MODERATE 35: CPT | Performed by: SPECIALIST

## 2025-02-12 RX ORDER — LANOLIN ALCOHOL/MO/W.PET/CERES
800 CREAM (GRAM) TOPICAL ONCE
Status: COMPLETED | OUTPATIENT
Start: 2025-02-12 | End: 2025-02-12

## 2025-02-12 RX ORDER — POTASSIUM CHLORIDE 1500 MG/1
40 TABLET, EXTENDED RELEASE ORAL ONCE
Status: COMPLETED | OUTPATIENT
Start: 2025-02-12 | End: 2025-02-12

## 2025-02-12 RX ORDER — CALCIUM CARBONATE 500 MG/1
1000 TABLET, CHEWABLE ORAL ONCE
Status: COMPLETED | OUTPATIENT
Start: 2025-02-12 | End: 2025-02-12

## 2025-02-12 RX ADMIN — PIPERACILLIN AND TAZOBACTAM 4.5 G: 36; 4.5 INJECTION, POWDER, LYOPHILIZED, FOR SOLUTION INTRAVENOUS at 12:36

## 2025-02-12 RX ADMIN — PIPERACILLIN AND TAZOBACTAM 4.5 G: 36; 4.5 INJECTION, POWDER, LYOPHILIZED, FOR SOLUTION INTRAVENOUS at 21:07

## 2025-02-12 RX ADMIN — PIPERACILLIN AND TAZOBACTAM 4.5 G: 36; 4.5 INJECTION, POWDER, LYOPHILIZED, FOR SOLUTION INTRAVENOUS at 06:26

## 2025-02-12 RX ADMIN — SODIUM CHLORIDE, SODIUM GLUCONATE, SODIUM ACETATE, POTASSIUM CHLORIDE, MAGNESIUM CHLORIDE, SODIUM PHOSPHATE, DIBASIC, AND POTASSIUM PHOSPHATE 75 ML/HR: .53; .5; .37; .037; .03; .012; .00082 INJECTION, SOLUTION INTRAVENOUS at 23:27

## 2025-02-12 RX ADMIN — TAMSULOSIN HYDROCHLORIDE 0.4 MG: 0.4 CAPSULE ORAL at 08:56

## 2025-02-12 RX ADMIN — POTASSIUM CHLORIDE 40 MEQ: 1500 TABLET, EXTENDED RELEASE ORAL at 12:39

## 2025-02-12 RX ADMIN — Medication 800 MG: at 12:39

## 2025-02-12 RX ADMIN — ENOXAPARIN SODIUM 40 MG: 40 INJECTION SUBCUTANEOUS at 08:56

## 2025-02-12 RX ADMIN — CALCIUM CARBONATE (ANTACID) CHEW TAB 500 MG 1000 MG: 500 CHEW TAB at 12:39

## 2025-02-12 RX ADMIN — LABETALOL HYDROCHLORIDE 20 MG: 5 INJECTION, SOLUTION INTRAVENOUS at 17:43

## 2025-02-12 NOTE — PHYSICAL THERAPY NOTE
PHYSICAL THERAPY NOTE          Patient Name: Duncan Sánchez  Today's Date: 2/12/2025 02/12/25 1412   PT Last Visit   PT Visit Date 02/12/25   Note Type   Note Type Treatment   Pain Assessment   Pain Assessment Tool 0-10   Pain Score No Pain   Restrictions/Precautions   Other Precautions Chair Alarm;Bed Alarm;Multiple lines;Fall Risk   General   Chart Reviewed Yes   Response to Previous Treatment Patient with no complaints from previous session.   Cognition   Overall Cognitive Status WFL   Arousal/Participation Cooperative   Orientation Level Oriented to person;Oriented to time;Oriented to place   Following Commands Follows one step commands without difficulty   Bed Mobility   Supine to Sit 6  Modified independent   Additional items HOB elevated;Increased time required   Sit to Supine 6  Modified independent   Additional items HOB elevated   Transfers   Sit to Stand 6  Modified independent   Additional items Increased time required   Stand to Sit 6  Modified independent   Additional items Increased time required;Other  (RW)   Ambulation/Elevation   Gait pattern Excessively slow  (shorter stride and improper wt shift w QC)   Gait Assistance 6  Modified independent   Additional items Assist x 1   Assistive Device Rolling walker;Small base quad cane   Distance 220' (10' w QC, 210' w RW)   Stair Management Assistance 5  Supervision   Additional items Assist x 1   Stair Management Technique One rail L;With cane;Alternating pattern;Step to pattern;Foreward   Number of Stairs 5   Balance   Static Standing Fair +  (w RW; fair - w quad cane)   Dynamic Standing Fair   Ambulatory Fair  (w RW; fair - w quad cane)   Endurance Deficit   Endurance Deficit No   Activity Tolerance   Activity Tolerance Patient tolerated treatment well   Nurse Made Aware Abigail SANDERS   Assessment   Prognosis Good   Assessment Duncan demonstrates progress towards goals  including improved am-pac, increased walking distance, progression to stair trial, decreased level of assist for bed mobility, transfers and ambulation. Ambulating community distances without difficulty. Negotiating steps to simulate multi level home environment. Continues to present w improved balance/ decreased gait deviations w use of RW vs QC. Encourage use of RW to improve stability and reduce fall risk. No further acute PT needs- maintain on restorative. The patient's AM-PAC Basic Mobility Inpatient Short Form Raw Score is 23. A Raw score of greater than 16 suggests the patient may benefit from discharge to home.   Barriers to Discharge None   Goals   PT Treatment Day 1   Plan   Progress Improving as expected   PT Frequency   (d/c PT: maintain on restorative)   Discharge Recommendation   Rehab Resource Intensity Level, PT III (Minimum Resource Intensity)  (OPPT for higher level balance)   AM-PAC Basic Mobility Inpatient   Turning in Flat Bed Without Bedrails 4   Lying on Back to Sitting on Edge of Flat Bed Without Bedrails 4   Moving Bed to Chair 4   Standing Up From Chair Using Arms 4   Walk in Room 4   Climb 3-5 Stairs With Railing 3   Basic Mobility Inpatient Raw Score 23   Basic Mobility Standardized Score 50.88   [facility] Highest Level Of Mobility   -HLM Goal 7: Walk 25 feet or more   -HLM Achieved 7: Walk 25 feet or more   End of Consult   Patient Position at End of Consult Supine;Bed/Chair alarm activated;All needs within reach     Sue Turner, PT

## 2025-02-12 NOTE — TELEPHONE ENCOUNTER
As a final attempt, a third outreach has been made via telephone call to facility. Please see Contacts section for details. This encounter will be closed and completed by end of day. Should we receive the requested information because of previous outreach attempts, the requested patient's chart will be updated appropriately.     Thank you  Talia Marrero MA

## 2025-02-12 NOTE — NURSING NOTE
Pt arrived to unit from ICU at 1754 with belongings.  RN agrees with prior RN nursing assessment. Pt oriented to room and staff and provided with call bell.

## 2025-02-12 NOTE — NURSING NOTE
This RN assumed care of pt @0300 and agrees with the previous nurse's assessment. This RN found Pt to be A & O x4 and in no apparent distress. VSS, IVF & Abx.

## 2025-02-12 NOTE — PLAN OF CARE
Problem: Potential for Falls  Goal: Patient will remain free of falls  Description: INTERVENTIONS:  - Educate patient/family on patient safety including physical limitations  - Instruct patient to call for assistance with activity   - Consult OT/PT to assist with strengthening/mobility   - Keep Call bell within reach  - Keep bed low and locked with side rails adjusted as appropriate  - Keep care items and personal belongings within reach  - Initiate and maintain comfort rounds  - Make Fall Risk Sign visible to staff  - Offer Toileting every 2 Hours, in advance of need  - Initiate/Maintain bed alarm  - Obtain necessary fall risk management equipment:  - Apply yellow socks and bracelet for high fall risk patients  - Consider moving patient to room near nurses station  Outcome: Progressing     Problem: MUSCULOSKELETAL - ADULT  Goal: Maintain or return mobility to safest level of function  Description: INTERVENTIONS:  - Assess patient's ability to carry out ADLs; assess patient's baseline for ADL function and identify physical deficits which impact ability to perform ADLs (bathing, care of mouth/teeth, toileting, grooming, dressing, etc.)  - Assess/evaluate cause of self-care deficits   - Assess range of motion  - Assess patient's mobility  - Assess patient's need for assistive devices and provide as appropriate  - Encourage maximum independence but intervene and supervise when necessary  - Involve family in performance of ADLs  - Assess for home care needs following discharge   - Consider OT consult to assist with ADL evaluation and planning for discharge  - Provide patient education as appropriate  Outcome: Progressing     Problem: DISCHARGE PLANNING  Goal: Discharge to home or other facility with appropriate resources  Description: INTERVENTIONS:  - Identify barriers to discharge w/patient and caregiver  - Arrange for needed discharge resources and transportation as appropriate  - Identify discharge learning needs  (meds, wound care, etc.)  - Arrange for interpretive services to assist at discharge as needed  - Refer to Case Management Department for coordinating discharge planning if the patient needs post-hospital services based on physician/advanced practitioner order or complex needs related to functional status, cognitive ability, or social support system  Outcome: Progressing     Problem: Knowledge Deficit  Goal: Patient/family/caregiver demonstrates understanding of disease process, treatment plan, medications, and discharge instructions  Description: Complete learning assessment and assess knowledge base.  Interventions:  - Provide teaching at level of understanding  - Provide teaching via preferred learning methods  Outcome: Progressing     Problem: Prexisting or High Potential for Compromised Skin Integrity  Goal: Skin integrity is maintained or improved  Description: INTERVENTIONS:  - Identify patients at risk for skin breakdown  - Assess and monitor skin integrity  - Assess and monitor nutrition and hydration status  - Monitor labs   - Assess for incontinence   - Turn and reposition patient  - Assist with mobility/ambulation2  - Relieve pressure over bony prominences  - Avoid friction and shearing  - Provide appropriate hygiene as needed including keeping skin clean and dry  - Evaluate need for skin moisturizer/barrier cream  - Collaborate with interdisciplinary team   - Patient/family teaching  - Consider wound care consult   Outcome: Progressing

## 2025-02-13 ENCOUNTER — APPOINTMENT (INPATIENT)
Dept: CT IMAGING | Facility: HOSPITAL | Age: 70
DRG: 331 | End: 2025-02-13
Payer: COMMERCIAL

## 2025-02-13 ENCOUNTER — ANESTHESIA EVENT (INPATIENT)
Dept: PERIOP | Facility: HOSPITAL | Age: 70
DRG: 331 | End: 2025-02-13
Payer: COMMERCIAL

## 2025-02-13 ENCOUNTER — TELEPHONE (OUTPATIENT)
Dept: FAMILY MEDICINE CLINIC | Facility: CLINIC | Age: 70
End: 2025-02-13

## 2025-02-13 LAB
ANION GAP SERPL CALCULATED.3IONS-SCNC: 9 MMOL/L (ref 4–13)
BACTERIA BLD CULT: ABNORMAL
BACTERIA BLD CULT: ABNORMAL
BASOPHILS # BLD AUTO: 0.04 THOUSANDS/ΜL (ref 0–0.1)
BASOPHILS NFR BLD AUTO: 0 % (ref 0–1)
BUN SERPL-MCNC: 6 MG/DL (ref 5–25)
CA-I BLD-SCNC: 1.1 MMOL/L (ref 1.12–1.32)
CALCIUM SERPL-MCNC: 8.4 MG/DL (ref 8.4–10.2)
CHLORIDE SERPL-SCNC: 104 MMOL/L (ref 96–108)
CO2 SERPL-SCNC: 24 MMOL/L (ref 21–32)
CREAT SERPL-MCNC: 0.57 MG/DL (ref 0.6–1.3)
E COLI DNA BLD POS QL NAA+NON-PROBE: DETECTED
EOSINOPHIL # BLD AUTO: 0.11 THOUSAND/ΜL (ref 0–0.61)
EOSINOPHIL NFR BLD AUTO: 1 % (ref 0–6)
ERYTHROCYTE [DISTWIDTH] IN BLOOD BY AUTOMATED COUNT: 14.1 % (ref 11.6–15.1)
GFR SERPL CREATININE-BSD FRML MDRD: 103 ML/MIN/1.73SQ M
GLUCOSE SERPL-MCNC: 115 MG/DL (ref 65–140)
GLUCOSE SERPL-MCNC: 119 MG/DL (ref 65–140)
GLUCOSE SERPL-MCNC: 128 MG/DL (ref 65–140)
GLUCOSE SERPL-MCNC: 128 MG/DL (ref 65–140)
GLUCOSE SERPL-MCNC: 143 MG/DL (ref 65–140)
GLUCOSE SERPL-MCNC: 166 MG/DL (ref 65–140)
GRAM STN SPEC: ABNORMAL
GRAM STN SPEC: ABNORMAL
HCT VFR BLD AUTO: 26.7 % (ref 36.5–49.3)
HGB BLD-MCNC: 8.8 G/DL (ref 12–17)
IMM GRANULOCYTES # BLD AUTO: 0.08 THOUSAND/UL (ref 0–0.2)
IMM GRANULOCYTES NFR BLD AUTO: 1 % (ref 0–2)
LYMPHOCYTES # BLD AUTO: 1.88 THOUSANDS/ΜL (ref 0.6–4.47)
LYMPHOCYTES NFR BLD AUTO: 19 % (ref 14–44)
MAGNESIUM SERPL-MCNC: 1.9 MG/DL (ref 1.9–2.7)
MCH RBC QN AUTO: 28.4 PG (ref 26.8–34.3)
MCHC RBC AUTO-ENTMCNC: 33 G/DL (ref 31.4–37.4)
MCV RBC AUTO: 86 FL (ref 82–98)
MONOCYTES # BLD AUTO: 0.82 THOUSAND/ΜL (ref 0.17–1.22)
MONOCYTES NFR BLD AUTO: 8 % (ref 4–12)
NEUTROPHILS # BLD AUTO: 7.04 THOUSANDS/ΜL (ref 1.85–7.62)
NEUTS SEG NFR BLD AUTO: 71 % (ref 43–75)
NRBC BLD AUTO-RTO: 0 /100 WBCS
PLATELET # BLD AUTO: 278 THOUSANDS/UL (ref 149–390)
PMV BLD AUTO: 10 FL (ref 8.9–12.7)
POTASSIUM SERPL-SCNC: 3.8 MMOL/L (ref 3.5–5.3)
RBC # BLD AUTO: 3.1 MILLION/UL (ref 3.88–5.62)
SODIUM SERPL-SCNC: 137 MMOL/L (ref 135–147)
WBC # BLD AUTO: 9.97 THOUSAND/UL (ref 4.31–10.16)

## 2025-02-13 PROCEDURE — 97535 SELF CARE MNGMENT TRAINING: CPT

## 2025-02-13 PROCEDURE — 82330 ASSAY OF CALCIUM: CPT

## 2025-02-13 PROCEDURE — 97530 THERAPEUTIC ACTIVITIES: CPT

## 2025-02-13 PROCEDURE — 85027 COMPLETE CBC AUTOMATED: CPT

## 2025-02-13 PROCEDURE — 82948 REAGENT STRIP/BLOOD GLUCOSE: CPT

## 2025-02-13 PROCEDURE — 83735 ASSAY OF MAGNESIUM: CPT

## 2025-02-13 PROCEDURE — 80048 BASIC METABOLIC PNL TOTAL CA: CPT

## 2025-02-13 PROCEDURE — 74177 CT ABD & PELVIS W/CONTRAST: CPT

## 2025-02-13 PROCEDURE — 99233 SBSQ HOSP IP/OBS HIGH 50: CPT | Performed by: SPECIALIST

## 2025-02-13 RX ORDER — GABAPENTIN 100 MG/1
100 CAPSULE ORAL 3 TIMES DAILY
Status: DISCONTINUED | OUTPATIENT
Start: 2025-02-13 | End: 2025-02-13

## 2025-02-13 RX ORDER — MAGNESIUM SULFATE HEPTAHYDRATE 40 MG/ML
2 INJECTION, SOLUTION INTRAVENOUS ONCE
Status: COMPLETED | OUTPATIENT
Start: 2025-02-13 | End: 2025-02-13

## 2025-02-13 RX ORDER — METRONIDAZOLE 500 MG/100ML
500 INJECTION, SOLUTION INTRAVENOUS
Status: COMPLETED | OUTPATIENT
Start: 2025-02-13 | End: 2025-02-14

## 2025-02-13 RX ORDER — METHOCARBAMOL 500 MG/1
500 TABLET, FILM COATED ORAL EVERY 6 HOURS SCHEDULED
Status: DISCONTINUED | OUTPATIENT
Start: 2025-02-13 | End: 2025-02-19 | Stop reason: HOSPADM

## 2025-02-13 RX ORDER — POLYETHYLENE GLYCOL 3350 17 G/17G
238 POWDER, FOR SOLUTION ORAL ONCE
Status: COMPLETED | OUTPATIENT
Start: 2025-02-13 | End: 2025-02-13

## 2025-02-13 RX ORDER — METRONIDAZOLE 500 MG/1
500 TABLET ORAL 3 TIMES DAILY
Status: COMPLETED | OUTPATIENT
Start: 2025-02-13 | End: 2025-02-13

## 2025-02-13 RX ORDER — BISACODYL 5 MG/1
10 TABLET, DELAYED RELEASE ORAL 2 TIMES DAILY
Status: COMPLETED | OUTPATIENT
Start: 2025-02-13 | End: 2025-02-13

## 2025-02-13 RX ORDER — CEFAZOLIN SODIUM 2 G/50ML
2000 SOLUTION INTRAVENOUS
Status: COMPLETED | OUTPATIENT
Start: 2025-02-13 | End: 2025-02-14

## 2025-02-13 RX ORDER — GABAPENTIN 300 MG/1
300 CAPSULE ORAL 3 TIMES DAILY
Status: DISCONTINUED | OUTPATIENT
Start: 2025-02-13 | End: 2025-02-19 | Stop reason: HOSPADM

## 2025-02-13 RX ORDER — POTASSIUM CHLORIDE 14.9 MG/ML
20 INJECTION INTRAVENOUS ONCE
Status: COMPLETED | OUTPATIENT
Start: 2025-02-13 | End: 2025-02-13

## 2025-02-13 RX ORDER — NEOMYCIN SULFATE 500 MG/1
1000 TABLET ORAL 3 TIMES DAILY
Status: COMPLETED | OUTPATIENT
Start: 2025-02-13 | End: 2025-02-13

## 2025-02-13 RX ORDER — CALCIUM GLUCONATE 20 MG/ML
2 INJECTION, SOLUTION INTRAVENOUS ONCE
Status: COMPLETED | OUTPATIENT
Start: 2025-02-13 | End: 2025-02-13

## 2025-02-13 RX ORDER — ACETAMINOPHEN 325 MG/1
650 TABLET ORAL EVERY 6 HOURS SCHEDULED
Status: DISCONTINUED | OUTPATIENT
Start: 2025-02-13 | End: 2025-02-14

## 2025-02-13 RX ADMIN — IOHEXOL 100 ML: 350 INJECTION, SOLUTION INTRAVENOUS at 10:16

## 2025-02-13 RX ADMIN — GABAPENTIN 300 MG: 300 CAPSULE ORAL at 17:00

## 2025-02-13 RX ADMIN — PIPERACILLIN AND TAZOBACTAM 4.5 G: 36; 4.5 INJECTION, POWDER, LYOPHILIZED, FOR SOLUTION INTRAVENOUS at 04:42

## 2025-02-13 RX ADMIN — PIPERACILLIN AND TAZOBACTAM 4.5 G: 36; 4.5 INJECTION, POWDER, LYOPHILIZED, FOR SOLUTION INTRAVENOUS at 20:43

## 2025-02-13 RX ADMIN — NEOMYCIN SULFATE 1000 MG: 500 TABLET ORAL at 14:00

## 2025-02-13 RX ADMIN — POLYETHYLENE GLYCOL 3350 238 G: 17 POWDER, FOR SOLUTION ORAL at 13:20

## 2025-02-13 RX ADMIN — MAGNESIUM SULFATE HEPTAHYDRATE 2 G: 40 INJECTION, SOLUTION INTRAVENOUS at 08:12

## 2025-02-13 RX ADMIN — METHOCARBAMOL 500 MG: 500 TABLET ORAL at 17:31

## 2025-02-13 RX ADMIN — LABETALOL HYDROCHLORIDE 20 MG: 5 INJECTION, SOLUTION INTRAVENOUS at 21:36

## 2025-02-13 RX ADMIN — METRONIDAZOLE 500 MG: 500 TABLET ORAL at 14:00

## 2025-02-13 RX ADMIN — IOHEXOL 50 ML: 240 INJECTION, SOLUTION INTRATHECAL; INTRAVASCULAR; INTRAVENOUS; ORAL at 10:16

## 2025-02-13 RX ADMIN — NEOMYCIN SULFATE 1000 MG: 500 TABLET ORAL at 13:19

## 2025-02-13 RX ADMIN — METRONIDAZOLE 500 MG: 500 TABLET ORAL at 13:13

## 2025-02-13 RX ADMIN — PIPERACILLIN AND TAZOBACTAM 4.5 G: 36; 4.5 INJECTION, POWDER, LYOPHILIZED, FOR SOLUTION INTRAVENOUS at 13:21

## 2025-02-13 RX ADMIN — NEOMYCIN SULFATE 1000 MG: 500 TABLET ORAL at 21:35

## 2025-02-13 RX ADMIN — ACETAMINOPHEN 650 MG: 325 TABLET, FILM COATED ORAL at 17:31

## 2025-02-13 RX ADMIN — LABETALOL HYDROCHLORIDE 20 MG: 5 INJECTION, SOLUTION INTRAVENOUS at 04:34

## 2025-02-13 RX ADMIN — TAMSULOSIN HYDROCHLORIDE 0.4 MG: 0.4 CAPSULE ORAL at 08:13

## 2025-02-13 RX ADMIN — METHOCARBAMOL 500 MG: 500 TABLET ORAL at 11:12

## 2025-02-13 RX ADMIN — METRONIDAZOLE 500 MG: 500 TABLET ORAL at 21:35

## 2025-02-13 RX ADMIN — INSULIN LISPRO 1 UNITS: 100 INJECTION, SOLUTION INTRAVENOUS; SUBCUTANEOUS at 17:32

## 2025-02-13 RX ADMIN — CALCIUM GLUCONATE 2 G: 20 INJECTION, SOLUTION INTRAVENOUS at 11:13

## 2025-02-13 RX ADMIN — ACETAMINOPHEN 650 MG: 325 TABLET, FILM COATED ORAL at 11:12

## 2025-02-13 RX ADMIN — BISACODYL 10 MG: 5 TABLET, COATED ORAL at 13:13

## 2025-02-13 RX ADMIN — SODIUM CHLORIDE, SODIUM GLUCONATE, SODIUM ACETATE, POTASSIUM CHLORIDE, MAGNESIUM CHLORIDE, SODIUM PHOSPHATE, DIBASIC, AND POTASSIUM PHOSPHATE 75 ML/HR: .53; .5; .37; .037; .03; .012; .00082 INJECTION, SOLUTION INTRAVENOUS at 13:13

## 2025-02-13 RX ADMIN — GABAPENTIN 300 MG: 300 CAPSULE ORAL at 11:12

## 2025-02-13 RX ADMIN — BISACODYL 10 MG: 5 TABLET, COATED ORAL at 17:31

## 2025-02-13 RX ADMIN — POTASSIUM CHLORIDE 20 MEQ: 14.9 INJECTION, SOLUTION INTRAVENOUS at 08:12

## 2025-02-13 RX ADMIN — LABETALOL HYDROCHLORIDE 20 MG: 5 INJECTION, SOLUTION INTRAVENOUS at 08:31

## 2025-02-13 RX ADMIN — GABAPENTIN 300 MG: 300 CAPSULE ORAL at 21:35

## 2025-02-13 NOTE — OCCUPATIONAL THERAPY NOTE
Occupational Therapy Progress Note     Patient Name: Duncan Sánchez  Today's Date: 2/13/2025  Problem List  Principal Problem:    Diverticulitis        02/13/25 1524   OT Last Visit   OT Visit Date 02/13/25   Note Type   Note Type Treatment   Pain Assessment   Pain Assessment Tool 0-10   Pain Score No Pain   Restrictions/Precautions   Weight Bearing Precautions Per Order No   Other Precautions Chair Alarm;Bed Alarm;Multiple lines   ADL   Grooming Assistance 6  Modified Independent   Grooming Deficit Increased time to complete;Wash/dry hands   LB Dressing Assistance 4  Minimal Assistance   LB Dressing Deficit Setup;Verbal cueing;Supervision/safety;Increased time to complete;Don/doff R sock;Don/doff L sock   Toileting Assistance  6  Modified independent   Toileting Deficit Increased time to complete;Perineal hygiene  (standard toilet)   Functional Standing Tolerance   Time ~5 mins   Activity Dynamic standing balance activity   Comments Fair dynamic standing balance w/ RW support   Bed Mobility   Supine to Sit 6  Modified independent   Additional items HOB elevated;Bedrails;Increased time required   Sit to Supine 6  Modified independent   Additional items HOB elevated;Increased time required   Additional Comments Pt lying supine with bed alarm activated at end of session. Call bell and phone within reach. All needs met and pt reports no further questions for OT at this time.   Transfers   Sit to Stand 6  Modified independent   Additional items Armrests;Bedrails;Increased time required   Stand to Sit 6  Modified independent   Additional items Increased time required   Toilet transfer 6  Modified independent   Additional items Increased time required;Standard toilet  (grab bar use)   Functional Mobility   Functional Mobility 6  Modified independent   Additional items Rolling walker   Cognition   Overall Cognitive Status WFL   Arousal/Participation Cooperative   Attention Within functional limits   Orientation Level  Oriented to person;Oriented to place;Oriented to situation;Disoriented to time   Memory Within functional limits   Following Commands Follows one step commands without difficulty   Activity Tolerance   Activity Tolerance Patient tolerated treatment well   Medical Staff Made Aware TOMMY Ayala   Assessment   Assessment Pt seen for OT treatment session focusing on functional activity tolerance, bed mobility, ADLs, functional transfers/mobility, and Safe transfer techniques. Pt alert and cooperative throughout session. Pt lying supine at start of session, completing bed mobility (supine<>sit) at a Mod I level w/ HOB elevated and use of bed rail. Transfers (sit<>stand) progressed to Mod I w/ good safety awareness demonstrated. Pt progressed to Mod I for functional mobility w/ Fair dynamic standing balance demonstrated w/ use of RW. Grooming and toileting tasks completed w/ Mod I 2* increased time to complete. LB dressing completed w/ Min A 2* impaired functional reach. Pt reports requiring assist w/ LB ADLs from A PTA. Pt lying supine with bed alarm activated at end of session. Call bell and phone within reach. All needs met and pt reports no further questions for OT at this time. Pt appears to be functioning near baseline level of performance for ADLs. No acute skilled OT needs identified at this time. Will D/C OT and recommend pt be maintained on restorative program.   Plan   Treatment Interventions ADL retraining;Functional transfer training;Endurance training;Patient/family training;Equipment evaluation/education;Compensatory technique education;Continued evaluation;Activityengagement   Goal Expiration Date 02/25/25   OT Treatment Day 1   OT Frequency Other (comment)  (D/C OT- maintain on restorative)   Discharge Recommendation   Rehab Resource Intensity Level, OT III (Minimum Resource Intensity)   Additional Comments  The patient's raw score on the AM-PAC Daily Activity Inpatient Short Form is 22. A raw score of  greater than or equal to 19 suggests the patient may benefit from discharge to home. Please refer to the recommendation of the Occupational Therapist for safe discharge planning.   AM-PAC Daily Activity Inpatient   Lower Body Dressing 3   Bathing 3   Toileting 4   Upper Body Dressing 4   Grooming 4   Eating 4   Daily Activity Raw Score 22   Daily Activity Standardized Score (Calc for Raw Score >=11) 47.1   AM-PAC Applied Cognition Inpatient   Following a Speech/Presentation 4   Understanding Ordinary Conversation 4   Taking Medications 3   Remembering Where Things Are Placed or Put Away 3   Remembering List of 4-5 Errands 3   Taking Care of Complicated Tasks 3   Applied Cognition Raw Score 20   Applied Cognition Standardized Score 41.76         Quyen Colón, OTR/L

## 2025-02-13 NOTE — TELEMEDICINE
e-Consult (IPC)  - Interventional Radiology  Duncan Sánchez 69 y.o. male MRN: [id number]  Unit/Bed#: [place] [address] -01 Encounter: [id number]          Interventional Radiology has been consulted to evaluate Duncan Sánchez    We were consulted by surgery concerning this patient with RLQ fluid collection.    Inpatient Consult to IR  Consult performed by: Ashlyn Cabezas PA-C  Consult ordered by: Igor De La Rosa MD        02/13/25    Assessment/Recommendation:     69 year old male with pmh of HTN, DM2, presenting to hospital on 2/9/25 with abdominal pain, nausea, diarrhea, subjective fever. Original CT abd/pelvis 2/9 with inflammatory changes in RLQ with gas adjacent to cecum, presumed inflamed diverticulum. Repeat CT was obtained 2/13 which revealed pericecal gas and fluid collection with unknown etiology as there is no communication with the colonic lumen. IR consulted for review of collection.    - imaging reviewed with attending. Current collection is not well formed and is not accessible for IR drain  - continue abx per primary    11-20 minutes, >50% of the total time devoted to medical consultative verbal/EMR discussion between providers. Written report will be generated in the EMR.     Thank you for allowing Interventional Radiology to participate in the care of Duncan Sánchez. Please don't hesitate to call or TigerText us with any questions.     Ashlyn Cabezas PA-C

## 2025-02-13 NOTE — PLAN OF CARE
Problem: OCCUPATIONAL THERAPY ADULT  Goal: Performs self-care activities at highest level of function for planned discharge setting.  See evaluation for individualized goals.  Description: Treatment Interventions: ADL retraining, Functional transfer training, Endurance training, Patient/family training, Cognitive reorientation, Neuromuscular reeducation, Fine motor coordination activities, Energy conservation, Activityengagement          See flowsheet documentation for full assessment, interventions and recommendations.   Outcome: Completed  Note: Limitation: Decreased ADL status, Decreased UE strength, Decreased Safe judgement during ADL, Decreased endurance, Decreased self-care trans, Decreased high-level ADLs  Prognosis: Good  Assessment: Pt seen for OT treatment session focusing on functional activity tolerance, bed mobility, ADLs, functional transfers/mobility, and Safe transfer techniques. Pt alert and cooperative throughout session. Pt lying supine at start of session, completing bed mobility (supine<>sit) at a Mod I level w/ HOB elevated and use of bed rail. Transfers (sit<>stand) progressed to Mod I w/ good safety awareness demonstrated. Pt progressed to Mod I for functional mobility w/ Fair dynamic standing balance demonstrated w/ use of RW. Grooming and toileting tasks completed w/ Mod I 2* increased time to complete. LB dressing completed w/ Min A 2* impaired functional reach. Pt reports requiring assist w/ LB ADLs from A PTA. Pt lying supine with bed alarm activated at end of session. Call bell and phone within reach. All needs met and pt reports no further questions for OT at this time. Pt appears to be functioning near baseline level of performance for ADLs. No acute skilled OT needs identified at this time. Will D/C OT and recommend pt be maintained on restorative program.     Rehab Resource Intensity Level, OT: III (Minimum Resource Intensity)

## 2025-02-13 NOTE — PROGRESS NOTES
Progress Note - Surgery-General   Name: Duncan Sánchez 69 y.o. male I MRN: [id number]  Unit/Bed#: [place] [address] -01 I Date of Admission: 2/9/2025   Date of Service: 2/13/2025 I Hospital Day: 4    Assessment & Plan  Diverticulitis  Right sided diverticulitis. Symptoms and WBC smoldering, with trial of nonop management    Plan  - CT with IV/PO  - NPO pending scan  - Maintenance IV fluids  - Continue IV abx: zosyn  - Low threshold for operative intervention        Subjective   Stable pain. No N/V, but also no appetite    Objective :  Temp:  [98.5 °F (36.9 °C)-99.7 °F (37.6 °C)] 98.5 °F (36.9 °C)  HR:  [62-87] 64  BP: (147-185)/() 153/64  Resp:  [16-18] 18  SpO2:  [95 %-98 %] 95 %  O2 Device: None (Room air)    I/O         02/11 0701  02/12 0700 02/12 0701  02/13 0700    P.O. 0 480    I.V. (mL/kg) 1051.7 (15.5) 950 (14)    IV Piggyback 250     Total Intake(mL/kg) 1301.7 (19.2) 1430 (21.1)    Urine (mL/kg/hr) 1650 (1) 1500 (0.9)    Stool 0     Total Output 1650 1500    Net -348.3 -70          Unmeasured Stool Occurrence 1 x             Physical Exam  Physical Exam:  General: NAD  CV: nl rate  Lungs: nl wob. No resp distress.  Chest: no lesions  ABD: Soft, ND, lower abdomen tender  Extrem: No CCE      Lab Results: I have reviewed the following results:  Recent Labs     02/12/25  0457 02/13/25  0444   WBC 11.17* 9.97   HGB 9.1* 8.8*   HCT 27.6* 26.7*    278   SODIUM 134*  --    K 3.4*  --      --    CO2 21  --    BUN 6  --    CREATININE 0.53*  --    GLUC 106  --    CAIONIZED 1.11*  --    MG 1.8*  --    PHOS 2.6  --

## 2025-02-13 NOTE — ASSESSMENT & PLAN NOTE
Right sided diverticulitis. Symptoms and WBC smoldering, with trial of nonop management    Plan  - CT with IV/PO  - NPO pending scan  - Maintenance IV fluids  - Continue IV abx: zosyn  - Low threshold for operative intervention

## 2025-02-13 NOTE — ANESTHESIA PREPROCEDURE EVALUATION
Procedure:  RESECTION COLON RIGHT LAP, ileocecectomy (Abdomen)    Relevant Problems   ANESTHESIA (within normal limits)      CARDIO   (+) Hyperlipidemia   (+) Primary hypertension      ENDO   (+) Type 2 diabetes mellitus without complication, without long-term current use of insulin (HCC)      GI/HEPATIC (within normal limits)      /RENAL   (+) Benign prostatic hyperplasia      GYN (within normal limits)      HEMATOLOGY (within normal limits)      MUSCULOSKELETAL   (+) Acute left-sided low back pain without sciatica      NEURO/PSYCH   (+) Chronic pain of both shoulders      PULMONARY (within normal limits)        Physical Exam    Airway    Mallampati score: II         Dental       Cardiovascular  Rhythm: regular, Rate: normal    Pulmonary   Decreased breath sounds    Other Findings        Anesthesia Plan  ASA Score- 2     Anesthesia Type- general with ASA Monitors.         Additional Monitors:     Airway Plan: ETT.           Plan Factors-Exercise tolerance (METS): >4 METS.    Chart reviewed. EKG reviewed.  Existing labs reviewed. Patient summary reviewed.          There is medical exclusion for perioperative obstructive sleep apnea risk education.        Induction- intravenous.    Postoperative Plan- Plan for postoperative opioid use.     Perioperative Resuscitation Plan - Level 1 - Full Code.       Informed Consent- Anesthetic plan and risks discussed with patient.        NPO Status:  No vitals data found for the desired time range.

## 2025-02-13 NOTE — PLAN OF CARE
Problem: Potential for Falls  Goal: Patient will remain free of falls  Description: INTERVENTIONS:  - Educate patient/family on patient safety including physical limitations  - Instruct patient to call for assistance with activity   - Consult OT/PT to assist with strengthening/mobility   - Keep Call bell within reach  - Keep bed low and locked with side rails adjusted as appropriate  - Keep care items and personal belongings within reach  - Initiate and maintain comfort rounds  - Make Fall Risk Sign visible to staff  - Offer Toileting every 2 Hours, in advance of need  - Initiate/Maintain bed alarm  - Obtain necessary fall risk management equipment: alarms  - Apply yellow socks and bracelet for high fall risk patients  - Consider moving patient to room near nurses station  Outcome: Progressing     Problem: MUSCULOSKELETAL - ADULT  Goal: Maintain or return mobility to safest level of function  Description: INTERVENTIONS:  - Assess patient's ability to carry out ADLs; assess patient's baseline for ADL function and identify physical deficits which impact ability to perform ADLs (bathing, care of mouth/teeth, toileting, grooming, dressing, etc.)  - Assess/evaluate cause of self-care deficits   - Assess range of motion  - Assess patient's mobility  - Assess patient's need for assistive devices and provide as appropriate  - Encourage maximum independence but intervene and supervise when necessary  - Involve family in performance of ADLs  - Assess for home care needs following discharge   - Consider OT consult to assist with ADL evaluation and planning for discharge  - Provide patient education as appropriate  Outcome: Progressing     Problem: DISCHARGE PLANNING  Goal: Discharge to home or other facility with appropriate resources  Description: INTERVENTIONS:  - Identify barriers to discharge w/patient and caregiver  - Arrange for needed discharge resources and transportation as appropriate  - Identify discharge learning  needs (meds, wound care, etc.)  - Arrange for interpretive services to assist at discharge as needed  - Refer to Case Management Department for coordinating discharge planning if the patient needs post-hospital services based on physician/advanced practitioner order or complex needs related to functional status, cognitive ability, or social support system  Outcome: Progressing     Problem: Knowledge Deficit  Goal: Patient/family/caregiver demonstrates understanding of disease process, treatment plan, medications, and discharge instructions  Description: Complete learning assessment and assess knowledge base.  Interventions:  - Provide teaching at level of understanding  - Provide teaching via preferred learning methods  Outcome: Progressing     Problem: Prexisting or High Potential for Compromised Skin Integrity  Goal: Skin integrity is maintained or improved  Description: INTERVENTIONS:  - Identify patients at risk for skin breakdown  - Assess and monitor skin integrity  - Assess and monitor nutrition and hydration status  - Monitor labs   - Assess for incontinence   - Turn and reposition patient  - Assist with mobility/ambulation  - Relieve pressure over bony prominences  - Avoid friction and shearing  - Provide appropriate hygiene as needed including keeping skin clean and dry  - Evaluate need for skin moisturizer/barrier cream  - Collaborate with interdisciplinary team   - Patient/family teaching  - Consider wound care consult   Outcome: Progressing

## 2025-02-13 NOTE — TELEPHONE ENCOUNTER
first attempt to contact patient. left message to return my call on answering machine.     Please assist in rescheduling 3/10/25 appt

## 2025-02-14 ENCOUNTER — ANESTHESIA (INPATIENT)
Dept: PERIOP | Facility: HOSPITAL | Age: 70
DRG: 331 | End: 2025-02-14
Payer: COMMERCIAL

## 2025-02-14 LAB
ABO GROUP BLD: NORMAL
ABO GROUP BLD: NORMAL
ACANTHOCYTES BLD QL SMEAR: PRESENT
ANION GAP SERPL CALCULATED.3IONS-SCNC: 12 MMOL/L (ref 4–13)
BASO STIPL BLD QL SMEAR: PRESENT
BASOPHILS # BLD MANUAL: 0 THOUSAND/UL (ref 0–0.1)
BASOPHILS NFR MAR MANUAL: 0 % (ref 0–1)
BLASTS ABSOLUTE COUNT: 0.1 THOUSAND/UL (ref 0–0)
BLASTS NFR BLD MANUAL: 1 %
BLD GP AB SCN SERPL QL: NEGATIVE
BUN SERPL-MCNC: 4 MG/DL (ref 5–25)
BURR CELLS BLD QL SMEAR: PRESENT
CA-I BLD-SCNC: 1.14 MMOL/L (ref 1.12–1.32)
CALCIUM SERPL-MCNC: 8.5 MG/DL (ref 8.4–10.2)
CHLORIDE SERPL-SCNC: 106 MMOL/L (ref 96–108)
CO2 SERPL-SCNC: 18 MMOL/L (ref 21–32)
CREAT SERPL-MCNC: 0.62 MG/DL (ref 0.6–1.3)
EOSINOPHIL # BLD MANUAL: 0.4 THOUSAND/UL (ref 0–0.4)
EOSINOPHIL NFR BLD MANUAL: 4 % (ref 0–6)
ERYTHROCYTE [DISTWIDTH] IN BLOOD BY AUTOMATED COUNT: 14.3 % (ref 11.6–15.1)
GFR SERPL CREATININE-BSD FRML MDRD: 99 ML/MIN/1.73SQ M
GLUCOSE SERPL-MCNC: 102 MG/DL (ref 65–140)
GLUCOSE SERPL-MCNC: 144 MG/DL (ref 65–140)
GLUCOSE SERPL-MCNC: 184 MG/DL (ref 65–140)
GLUCOSE SERPL-MCNC: 88 MG/DL (ref 65–140)
GLUCOSE SERPL-MCNC: 92 MG/DL (ref 65–140)
HCT VFR BLD AUTO: 26.2 % (ref 36.5–49.3)
HCT VFR BLD AUTO: 27 % (ref 36.5–49.3)
HGB BLD-MCNC: 8.4 G/DL (ref 12–17)
HGB BLD-MCNC: 8.8 G/DL (ref 12–17)
LYMPHOCYTES # BLD AUTO: 2.4 THOUSAND/UL (ref 0.6–4.47)
LYMPHOCYTES # BLD AUTO: 24 % (ref 14–44)
MCH RBC QN AUTO: 29 PG (ref 26.8–34.3)
MCHC RBC AUTO-ENTMCNC: 32.6 G/DL (ref 31.4–37.4)
MCV RBC AUTO: 89 FL (ref 82–98)
MONOCYTES # BLD AUTO: 0.8 THOUSAND/UL (ref 0–1.22)
MONOCYTES NFR BLD: 8 % (ref 4–12)
NEUTROPHILS # BLD MANUAL: 6.31 THOUSAND/UL (ref 1.85–7.62)
NEUTS BAND NFR BLD MANUAL: 3 % (ref 0–8)
NEUTS SEG NFR BLD AUTO: 60 % (ref 43–75)
OVALOCYTES BLD QL SMEAR: PRESENT
PLATELET # BLD AUTO: 321 THOUSANDS/UL (ref 149–390)
PLATELET BLD QL SMEAR: ABNORMAL
PLATELET CLUMP BLD QL SMEAR: PRESENT
PMV BLD AUTO: 9.1 FL (ref 8.9–12.7)
POLYCHROMASIA BLD QL SMEAR: PRESENT
POTASSIUM SERPL-SCNC: 3.7 MMOL/L (ref 3.5–5.3)
RBC # BLD AUTO: 3.03 MILLION/UL (ref 3.88–5.62)
RBC MORPH BLD: PRESENT
RH BLD: POSITIVE
RH BLD: POSITIVE
SODIUM SERPL-SCNC: 136 MMOL/L (ref 135–147)
SPECIMEN EXPIRATION DATE: NORMAL
WBC # BLD AUTO: 10.02 THOUSAND/UL (ref 4.31–10.16)

## 2025-02-14 PROCEDURE — 0DBB4ZZ EXCISION OF ILEUM, PERCUTANEOUS ENDOSCOPIC APPROACH: ICD-10-PCS | Performed by: SPECIALIST

## 2025-02-14 PROCEDURE — 85027 COMPLETE CBC AUTOMATED: CPT

## 2025-02-14 PROCEDURE — NC001 PR NO CHARGE: Performed by: SPECIALIST

## 2025-02-14 PROCEDURE — 86923 COMPATIBILITY TEST ELECTRIC: CPT

## 2025-02-14 PROCEDURE — 86850 RBC ANTIBODY SCREEN: CPT | Performed by: ANESTHESIOLOGY

## 2025-02-14 PROCEDURE — 82948 REAGENT STRIP/BLOOD GLUCOSE: CPT

## 2025-02-14 PROCEDURE — 88307 TISSUE EXAM BY PATHOLOGIST: CPT | Performed by: STUDENT IN AN ORGANIZED HEALTH CARE EDUCATION/TRAINING PROGRAM

## 2025-02-14 PROCEDURE — 82330 ASSAY OF CALCIUM: CPT

## 2025-02-14 PROCEDURE — 85018 HEMOGLOBIN: CPT | Performed by: ANESTHESIOLOGY

## 2025-02-14 PROCEDURE — 86900 BLOOD TYPING SEROLOGIC ABO: CPT | Performed by: ANESTHESIOLOGY

## 2025-02-14 PROCEDURE — 0DTJ4ZZ RESECTION OF APPENDIX, PERCUTANEOUS ENDOSCOPIC APPROACH: ICD-10-PCS | Performed by: SPECIALIST

## 2025-02-14 PROCEDURE — 85014 HEMATOCRIT: CPT | Performed by: ANESTHESIOLOGY

## 2025-02-14 PROCEDURE — 80048 BASIC METABOLIC PNL TOTAL CA: CPT

## 2025-02-14 PROCEDURE — 44205 LAP COLECTOMY PART W/ILEUM: CPT | Performed by: SPECIALIST

## 2025-02-14 PROCEDURE — 86901 BLOOD TYPING SEROLOGIC RH(D): CPT | Performed by: ANESTHESIOLOGY

## 2025-02-14 PROCEDURE — 85007 BL SMEAR W/DIFF WBC COUNT: CPT

## 2025-02-14 PROCEDURE — 0DTH4ZZ RESECTION OF CECUM, PERCUTANEOUS ENDOSCOPIC APPROACH: ICD-10-PCS | Performed by: SPECIALIST

## 2025-02-14 RX ORDER — SODIUM CHLORIDE 9 MG/ML
INJECTION, SOLUTION INTRAVENOUS CONTINUOUS PRN
Status: DISCONTINUED | OUTPATIENT
Start: 2025-02-14 | End: 2025-02-14

## 2025-02-14 RX ORDER — ONDANSETRON 2 MG/ML
4 INJECTION INTRAMUSCULAR; INTRAVENOUS ONCE AS NEEDED
Status: DISCONTINUED | OUTPATIENT
Start: 2025-02-14 | End: 2025-02-14 | Stop reason: HOSPADM

## 2025-02-14 RX ORDER — DEXAMETHASONE SODIUM PHOSPHATE 10 MG/ML
INJECTION, SOLUTION INTRAMUSCULAR; INTRAVENOUS AS NEEDED
Status: DISCONTINUED | OUTPATIENT
Start: 2025-02-14 | End: 2025-02-14

## 2025-02-14 RX ORDER — FENTANYL CITRATE/PF 50 MCG/ML
25 SYRINGE (ML) INJECTION
Status: DISCONTINUED | OUTPATIENT
Start: 2025-02-14 | End: 2025-02-14 | Stop reason: HOSPADM

## 2025-02-14 RX ORDER — PROPOFOL 10 MG/ML
INJECTION, EMULSION INTRAVENOUS AS NEEDED
Status: DISCONTINUED | OUTPATIENT
Start: 2025-02-14 | End: 2025-02-14

## 2025-02-14 RX ORDER — CEFAZOLIN SODIUM 2 G/50ML
2000 SOLUTION INTRAVENOUS ONCE
Status: DISCONTINUED | OUTPATIENT
Start: 2025-02-14 | End: 2025-02-14 | Stop reason: HOSPADM

## 2025-02-14 RX ORDER — SUCCINYLCHOLINE/SOD CL,ISO/PF 100 MG/5ML
SYRINGE (ML) INTRAVENOUS AS NEEDED
Status: DISCONTINUED | OUTPATIENT
Start: 2025-02-14 | End: 2025-02-14

## 2025-02-14 RX ORDER — ACETAMINOPHEN 325 MG/1
975 TABLET ORAL EVERY 6 HOURS SCHEDULED
Status: DISCONTINUED | OUTPATIENT
Start: 2025-02-14 | End: 2025-02-19 | Stop reason: HOSPADM

## 2025-02-14 RX ORDER — FENTANYL CITRATE 50 UG/ML
INJECTION, SOLUTION INTRAMUSCULAR; INTRAVENOUS AS NEEDED
Status: DISCONTINUED | OUTPATIENT
Start: 2025-02-14 | End: 2025-02-14

## 2025-02-14 RX ORDER — LIDOCAINE HYDROCHLORIDE 20 MG/ML
INJECTION, SOLUTION EPIDURAL; INFILTRATION; INTRACAUDAL; PERINEURAL AS NEEDED
Status: DISCONTINUED | OUTPATIENT
Start: 2025-02-14 | End: 2025-02-14

## 2025-02-14 RX ORDER — BUPIVACAINE HYDROCHLORIDE AND EPINEPHRINE 2.5; 5 MG/ML; UG/ML
INJECTION, SOLUTION EPIDURAL; INFILTRATION; INTRACAUDAL; PERINEURAL AS NEEDED
Status: DISCONTINUED | OUTPATIENT
Start: 2025-02-14 | End: 2025-02-14 | Stop reason: HOSPADM

## 2025-02-14 RX ORDER — HYDROMORPHONE HCL/PF 1 MG/ML
SYRINGE (ML) INJECTION AS NEEDED
Status: DISCONTINUED | OUTPATIENT
Start: 2025-02-14 | End: 2025-02-14

## 2025-02-14 RX ORDER — MIDAZOLAM HYDROCHLORIDE 2 MG/2ML
INJECTION, SOLUTION INTRAMUSCULAR; INTRAVENOUS AS NEEDED
Status: DISCONTINUED | OUTPATIENT
Start: 2025-02-14 | End: 2025-02-14

## 2025-02-14 RX ORDER — ONDANSETRON 2 MG/ML
INJECTION INTRAMUSCULAR; INTRAVENOUS AS NEEDED
Status: DISCONTINUED | OUTPATIENT
Start: 2025-02-14 | End: 2025-02-14

## 2025-02-14 RX ORDER — METRONIDAZOLE 500 MG/100ML
500 INJECTION, SOLUTION INTRAVENOUS ONCE
Status: DISCONTINUED | OUTPATIENT
Start: 2025-02-14 | End: 2025-02-14 | Stop reason: HOSPADM

## 2025-02-14 RX ORDER — HYDROMORPHONE HCL/PF 1 MG/ML
0.5 SYRINGE (ML) INJECTION
Status: DISCONTINUED | OUTPATIENT
Start: 2025-02-14 | End: 2025-02-14 | Stop reason: HOSPADM

## 2025-02-14 RX ORDER — ROCURONIUM BROMIDE 10 MG/ML
INJECTION, SOLUTION INTRAVENOUS AS NEEDED
Status: DISCONTINUED | OUTPATIENT
Start: 2025-02-14 | End: 2025-02-14

## 2025-02-14 RX ADMIN — ROCURONIUM 10 MG: 50 INJECTION, SOLUTION INTRAVENOUS at 13:14

## 2025-02-14 RX ADMIN — ACETAMINOPHEN 650 MG: 325 TABLET, FILM COATED ORAL at 06:31

## 2025-02-14 RX ADMIN — ROCURONIUM 40 MG: 50 INJECTION, SOLUTION INTRAVENOUS at 13:20

## 2025-02-14 RX ADMIN — PIPERACILLIN AND TAZOBACTAM 4.5 G: 36; 4.5 INJECTION, POWDER, LYOPHILIZED, FOR SOLUTION INTRAVENOUS at 05:11

## 2025-02-14 RX ADMIN — SODIUM CHLORIDE: 9 INJECTION, SOLUTION INTRAVENOUS at 13:40

## 2025-02-14 RX ADMIN — HYDROMORPHONE HYDROCHLORIDE 0.5 MG: 1 INJECTION, SOLUTION INTRAMUSCULAR; INTRAVENOUS; SUBCUTANEOUS at 20:07

## 2025-02-14 RX ADMIN — LIDOCAINE HYDROCHLORIDE 100 MG: 20 INJECTION, SOLUTION EPIDURAL; INFILTRATION; INTRACAUDAL at 13:14

## 2025-02-14 RX ADMIN — CEFAZOLIN SODIUM 2000 MG: 2 SOLUTION INTRAVENOUS at 13:25

## 2025-02-14 RX ADMIN — TAMSULOSIN HYDROCHLORIDE 0.4 MG: 0.4 CAPSULE ORAL at 09:52

## 2025-02-14 RX ADMIN — GABAPENTIN 300 MG: 300 CAPSULE ORAL at 09:52

## 2025-02-14 RX ADMIN — FENTANYL CITRATE 50 MCG: 50 INJECTION INTRAMUSCULAR; INTRAVENOUS at 13:30

## 2025-02-14 RX ADMIN — PROPOFOL 80 MG: 10 INJECTION, EMULSION INTRAVENOUS at 13:14

## 2025-02-14 RX ADMIN — FENTANYL CITRATE 50 MCG: 50 INJECTION INTRAMUSCULAR; INTRAVENOUS at 14:11

## 2025-02-14 RX ADMIN — SUGAMMADEX 200 MG: 100 INJECTION, SOLUTION INTRAVENOUS at 16:21

## 2025-02-14 RX ADMIN — FENTANYL CITRATE 50 MCG: 50 INJECTION INTRAMUSCULAR; INTRAVENOUS at 14:58

## 2025-02-14 RX ADMIN — DEXAMETHASONE SODIUM PHOSPHATE 10 MG: 10 INJECTION, SOLUTION INTRAMUSCULAR; INTRAVENOUS at 13:17

## 2025-02-14 RX ADMIN — METHOCARBAMOL 500 MG: 500 TABLET ORAL at 00:58

## 2025-02-14 RX ADMIN — SODIUM CHLORIDE: 0.9 INJECTION, SOLUTION INTRAVENOUS at 15:59

## 2025-02-14 RX ADMIN — METRONIDAZOLE: 500 INJECTION, SOLUTION INTRAVENOUS at 13:26

## 2025-02-14 RX ADMIN — FENTANYL CITRATE 50 MCG: 50 INJECTION INTRAMUSCULAR; INTRAVENOUS at 13:15

## 2025-02-14 RX ADMIN — ONDANSETRON 4 MG: 2 INJECTION INTRAMUSCULAR; INTRAVENOUS at 16:14

## 2025-02-14 RX ADMIN — ROCURONIUM 20 MG: 50 INJECTION, SOLUTION INTRAVENOUS at 14:45

## 2025-02-14 RX ADMIN — ACETAMINOPHEN 650 MG: 325 TABLET, FILM COATED ORAL at 00:58

## 2025-02-14 RX ADMIN — METHOCARBAMOL 500 MG: 500 TABLET ORAL at 06:31

## 2025-02-14 RX ADMIN — ACETAMINOPHEN 650 MG: 325 TABLET, FILM COATED ORAL at 11:58

## 2025-02-14 RX ADMIN — Medication 100 MG: at 13:14

## 2025-02-14 RX ADMIN — MIDAZOLAM 2 MG: 1 INJECTION INTRAMUSCULAR; INTRAVENOUS at 13:13

## 2025-02-14 RX ADMIN — METHOCARBAMOL 500 MG: 500 TABLET ORAL at 11:58

## 2025-02-14 RX ADMIN — SODIUM CHLORIDE, SODIUM GLUCONATE, SODIUM ACETATE, POTASSIUM CHLORIDE, MAGNESIUM CHLORIDE, SODIUM PHOSPHATE, DIBASIC, AND POTASSIUM PHOSPHATE 75 ML/HR: .53; .5; .37; .037; .03; .012; .00082 INJECTION, SOLUTION INTRAVENOUS at 03:07

## 2025-02-14 RX ADMIN — HYDROMORPHONE HYDROCHLORIDE 0.5 MG: 1 INJECTION, SOLUTION INTRAMUSCULAR; INTRAVENOUS; SUBCUTANEOUS at 15:46

## 2025-02-14 RX ADMIN — GABAPENTIN 300 MG: 300 CAPSULE ORAL at 20:09

## 2025-02-14 RX ADMIN — PIPERACILLIN AND TAZOBACTAM 4.5 G: 36; 4.5 INJECTION, POWDER, LYOPHILIZED, FOR SOLUTION INTRAVENOUS at 11:59

## 2025-02-14 RX ADMIN — ROCURONIUM 10 MG: 50 INJECTION, SOLUTION INTRAVENOUS at 15:41

## 2025-02-14 RX ADMIN — SODIUM CHLORIDE: 0.9 INJECTION, SOLUTION INTRAVENOUS at 13:09

## 2025-02-14 RX ADMIN — PIPERACILLIN AND TAZOBACTAM 4.5 G: 36; 4.5 INJECTION, POWDER, LYOPHILIZED, FOR SOLUTION INTRAVENOUS at 20:07

## 2025-02-14 NOTE — PLAN OF CARE
Problem: Potential for Falls  Goal: Patient will remain free of falls  Description: INTERVENTIONS:  - Educate patient/family on patient safety including physical limitations  - Instruct patient to call for assistance with activity   - Consult OT/PT to assist with strengthening/mobility   - Keep Call bell within reach  - Keep bed low and locked with side rails adjusted as appropriate  - Keep care items and personal belongings within reach  - Initiate and maintain comfort rounds  - Make Fall Risk Sign visible to staff  - Offer Toileting every 2 Hours, in advance of need  - Initiate/Maintain bed alarm  - Obtain necessary fall risk management equipment: alarms  - Apply yellow socks and bracelet for high fall risk patients  - Consider moving patient to room near nurses station  Outcome: Progressing     Problem: MUSCULOSKELETAL - ADULT  Goal: Maintain or return mobility to safest level of function  Description: INTERVENTIONS:  - Assess patient's ability to carry out ADLs; assess patient's baseline for ADL function and identify physical deficits which impact ability to perform ADLs (bathing, care of mouth/teeth, toileting, grooming, dressing, etc.)  - Assess/evaluate cause of self-care deficits   - Assess range of motion  - Assess patient's mobility  - Assess patient's need for assistive devices and provide as appropriate  - Encourage maximum independence but intervene and supervise when necessary  - Involve family in performance of ADLs  - Assess for home care needs following discharge   - Consider OT consult to assist with ADL evaluation and planning for discharge  - Provide patient education as appropriate  Outcome: Progressing     Problem: DISCHARGE PLANNING  Goal: Discharge to home or other facility with appropriate resources  Description: INTERVENTIONS:  - Identify barriers to discharge w/patient and caregiver  - Arrange for needed discharge resources and transportation as appropriate  - Identify discharge learning  needs (meds, wound care, etc.)  - Arrange for interpretive services to assist at discharge as needed  - Refer to Case Management Department for coordinating discharge planning if the patient needs post-hospital services based on physician/advanced practitioner order or complex needs related to functional status, cognitive ability, or social support system  Outcome: Progressing     Problem: Knowledge Deficit  Goal: Patient/family/caregiver demonstrates understanding of disease process, treatment plan, medications, and discharge instructions  Description: Complete learning assessment and assess knowledge base.  Interventions:  - Provide teaching at level of understanding  - Provide teaching via preferred learning methods  Outcome: Progressing     Problem: Prexisting or High Potential for Compromised Skin Integrity  Goal: Skin integrity is maintained or improved  Description: INTERVENTIONS:  - Identify patients at risk for skin breakdown  - Assess and monitor skin integrity  - Assess and monitor nutrition and hydration status  - Monitor labs   - Assess for incontinence   - Turn and reposition patient  - Assist with mobility/ambulation  - Relieve pressure over bony prominences  - Avoid friction and shearing  - Provide appropriate hygiene as needed including keeping skin clean and dry  - Evaluate need for skin moisturizer/barrier cream  - Collaborate with interdisciplinary team   - Patient/family teaching  - Consider wound care consult   Outcome: Progressing     Problem: Nutrition/Hydration-ADULT  Goal: Nutrient/Hydration intake appropriate for improving, restoring or maintaining nutritional needs  Description: Monitor and assess patient's nutrition/hydration status for malnutrition. Collaborate with interdisciplinary team and initiate plan and interventions as ordered.  Monitor patient's weight and dietary intake as ordered or per policy. Utilize nutrition screening tool and intervene as necessary. Determine patient's  food preferences and provide high-protein, high-caloric foods as appropriate.     INTERVENTIONS:  - Monitor oral intake, urinary output, labs, and treatment plans  - Assess nutrition and hydration status and recommend course of action  - Evaluate amount of meals eaten  - Assist patient with eating if necessary   - Allow adequate time for meals  - Recommend/ encourage appropriate diets, oral nutritional supplements, and vitamin/mineral supplements  - Order, calculate, and assess calorie counts as needed  - Recommend, monitor, and adjust tube feedings and TPN/PPN based on assessed needs  - Assess need for intravenous fluids  - Provide specific nutrition/hydration education as appropriate  - Include patient/family/caregiver in decisions related to nutrition  Outcome: Progressing

## 2025-02-14 NOTE — ANESTHESIA POSTPROCEDURE EVALUATION
Post-Op Assessment Note    CV Status:  Stable    Pain management: adequate       Mental Status:  Sleepy and arousable   Hydration Status:  Euvolemic   PONV Controlled:  Controlled   Airway Patency:  Patent     Post Op Vitals Reviewed: Yes    No anethesia notable event occurred.    Staff: CRNA           Last Filed PACU Vitals:  Vitals Value Taken Time   Temp 98.4    Pulse 106 02/14/25 1637   /58 02/14/25 1634   Resp 15    SpO2 99 % 02/14/25 1637   Vitals shown include unfiled device data.

## 2025-02-14 NOTE — PERIOPERATIVE NURSING NOTE
Examined at bedside by Dr. Tovar.  Sleepy.  Opens eyes with verbal and tactile stimuli. Shakes head in respond to questions if he have any pain or cold. Brief smile noticed when awoke. Observed to moves right arm spontaneously, sekou then not follow commands when asked to move any extremity.  VSS.  Will continue to monitor. Dr. Tovar said just to give him more time to wake up.

## 2025-02-14 NOTE — PERIOPERATIVE NURSING NOTE
Sleeping. Easily arousable.  Follows commands.  Farida. VSS. No S/S of distress. Waiting for receiving nurse to take report.

## 2025-02-14 NOTE — OP NOTE
OPERATIVE REPORT  PATIENT NAME: Duncan Sánchez    :  1955  MRN: [id number]  Pt Location: [facility] OR ROOM 08    SURGERY DATE: 2025    Surgeons and Role:     * Pablo Conner MD - Primary  Nicholas Bull MD - Assisting    Preop Diagnosis:  Perforated diverticulum of large intestine [K57.20]  Peridiverticular Abscess    Post-Op Diagnosis Codes:     * Perforated diverticulum of large intestine [K57.20]    Procedure(s):  LAP ILEOCOLIC RESECTION. HAND-ASSISTED    Specimen(s):  ID Type Source Tests Collected by Time Destination   1 : Femoral ileum and cecum Tissue Intestine TISSUE EXAM Pablo Conner MD 2025 1526        Estimated Blood Loss:   350 mL    Drains:  Urethral Catheter Non-latex 16 Fr. (Active)   Number of days: 0       [REMOVED] NG/OG/Enteral Tube 18 Fr (Removed)   Number of days: 0       Anesthesia Type:   Choice    Operative Indications:  Perforated diverticulum of large intestine [K57.20]  68 yo  Male presenting with sepsis, bacteremia, suspected perforated Right sided diverticulitis with subsequent abscess not amendable to percutaneous drainage.    Operative Findings:  Phlegmon with Terminal ileum, cecum and appendix.  En Bloc resection of TI, Cecum, appendix with stapled side to side functional end to end anastomosis.      Complications:   None    Procedure and Technique:  The patient was identified by myself, taken to the operating room and placed in a supine position on the operating table.  After induction of satisfactory general endotracheal anesthesia an orogastric tube and a Castillo catheter were placed.  The patient was then prepped and draped in the usual sterile fashion using ChloraPrep solution.  The timeout was conducted, the patient identified as Duncan Sánchez, IV antibiotics were confirmed as given, sequential compression devices were on and functioning.  All parties agreed this was the appropriate patient for the proposed procedure.  Local anesthetic  consisting of quarter percent Marcaine with epinephrine was infiltrated following which a transverse incision was placed above into the left of the umbilicus with a #15 scalpel.  The abdominal wall was elevated with a perforating towel clip and a Veress needle was introduced.  After performing the saline drop test the abdomen was insufflated with carbon dioxide.  After achieving satisfactory pneumoperitoneum the Veress needle was removed and a 5 mm 0 degree scope within a 5 mm optical cannula was introduced.  After breaching the peritoneum the trocar portion was removed and the scope was reintroduced.  There is no evidence of trauma from the Veress needle or port placement.  Survey of the abdomen showed the cecum to be somewhat prominent as it being pushed forward by inflammatory process posterior to it, there was no intraperitoneal inflammation appreciated, however.  Under laparoscopic direction a 5 mm cannula was placed lateral to the umbilicus and the left side and a third 5 mm port in the left lower quadrant.  2 atraumatic graspers were introduced.  The patient was rolled to the left side and placed in Trendelenburg position.  The small bowel was delivered out of the pelvis found to be quite mobile up until the point where the terminal ileum became involved with the phlegmonous process.  The appendix was noted to be plastered on top, the cecum was not mobile.  The 5 mm LigaSure device was then brought onto the field.  The right colonic gutter was demonstrated, then the peritoneum nicked with the LigaSure and be gently spreading and demonstrating the peritoneum it was able to spread underneath the phlegmon demonstrating that and not perforated into the retroperitoneum.  Attention was then turned to the terminal ileum, and the mesentery medial to the phlegmon was again nicked with the LigaSure, and the overlying peritoneum divided which gave access to the mesentery of the terminal ileum and the cecum.  Careful  dissection was able to demonstrate the ileocolic pedicle which was then divided using the LigaSure.  Further progress was then possible without mobilizing the phlegmon and for this the HandPort was placed.  A muscle-splitting incision was performed in the right lower quadrant directly over the phlegmon and the Mychal retractor followed by the GelPort was placed, pneumoperitoneum was reestablished.  The phlegmon was elderly mobilized off the retroperitoneum and the LigaSure utilized to divide the remaining thickened inflamed mesentery such that the bowel would reach to the anterior abdominal wall without tension.  The GelPort was removed the patient returned to a level position and the bowel delivered.  The bowel was then resected using 2 firings of the BLANCHE 80 stapler and the specimen removed from the field.  The stapled ends were brought into approximation and the side-to-side, functional end-to-end, anastomosis performed using a third firing of the BLANCHE 80 stapler.  The enterotomy was then closed using a TA 60 stapler.  After inspecting the anastomosis the mesenteric defect was closed using a continuous 3-0 chromic suture.  The bowel was then returned to the abdominal cavity the pneumoperitoneum reestablished and the field irrigated with the suction , returns were clear and hemostasis was excellent.  The patient was again leveled out, the pneumoperitoneum was allowed to dissipate the HandPort and the trocars were removed.  The 5 mm trocar sites were closed using subcuticular 4 Monocryl suture followed by Histacol.  The muscle-splitting incision was closed in layers using 3-0 chromic for the peritoneum, and 2-0 Vicryl for the muscle layers.  Quarter percent Marcaine with epinephrine was infiltrated for postoperative analgesia following which the skin was closed using subcuticular 4-0 Monocryl followed by history of curl.  The orogastric tube was removed before the patient made an uneventful recovery from his  anesthetic.  He was extubated in the operating room moved to his waiting stretcher and taken to the recovery room in good condition having tolerated the procedure well.   I was present for the entire procedure.    Patient Disposition:  PACU  and extubated and stable    This procedure was not performed to treat colon cancer through resection           SIGNATURE: Pablo Conner MD  DATE: February 14, 2025  TIME: 4:19 PM

## 2025-02-14 NOTE — ASSESSMENT & PLAN NOTE
68 yo male with right sided perforated diverticulitis, plan for OR today    Htn to systolics of 180's, otherwise AVSS on RA   cc    WBC 10 from 9.9  Hgb 8.8 from 8.8  Cr 0.62 from 0.57    Plan  NPO  IV fluids  IV abx  PRN pain meds and anti nausea meds  DVT prophylaxis  Plan for OR today for washout, possible right ileocectomy

## 2025-02-14 NOTE — PROGRESS NOTES
Progress Note - Surgery-General   Name: Duncan Sánchez 69 y.o. male I MRN: [id number]  Unit/Bed#: [place] [address] -01 I Date of Admission: 2/9/2025   Date of Service: 2/14/2025 I Hospital Day: 5    Assessment & Plan  Diverticulitis  68 yo male with right sided perforated diverticulitis, plan for OR today    Htn to systolics of 180's, otherwise AVSS on RA   cc    WBC 10 from 9.9  Hgb 8.8 from 8.8  Cr 0.62 from 0.57    Plan  NPO  IV fluids  IV abx  PRN pain meds and anti nausea meds  DVT prophylaxis  Plan for OR today for washout, possible right ileocectomy        Subjective   No acute events overnight. Patient denies having nausea, vomiting, fevers, chills, chest pain, shortness of breath. Currently NPO. Voiding without difficulty. Having bowel movements and passing flatus.     Objective :  Temp:  [98.1 °F (36.7 °C)-98.2 °F (36.8 °C)] 98.2 °F (36.8 °C)  HR:  [62-85] 72  BP: (129-185)/(59-89) 154/59  Resp:  [18] 18  SpO2:  [96 %-99 %] 96 %  O2 Device: None (Room air)    I/O         02/12 0701  02/13 0700 02/13 0701  02/14 0700 02/14 0701  02/15 0700    P.O. 480      I.V. (mL/kg) 950 (14) 2073.8 (30.6)     IV Piggyback       Total Intake(mL/kg) 1430 (21.1) 2073.8 (30.6)     Urine (mL/kg/hr) 1500 (0.9) 450 (0.3)     Stool       Total Output 1500 450     Net -70 +1623.8                    Physical Exam      Physical Exam:  General: No acute distress, alert and oriented  Neuro: alert, oriented x3  HENT: PERRL, EOMI  CV: Well perfused, regular rate and rhythm  Lungs: Normal work of breathing, no increased respiratory effort  Abdomen: Soft, nontender, mildly distended.   MSK/Extremities: No edema, clubbing or cyanosis  Skin: Warm, dry      Lab Results: I have reviewed the following results:  Recent Labs     02/12/25  0427 02/13/25  0444 02/14/25  0638   WBC 11.17* 9.97 10.02   HGB 9.1* 8.8* 8.8*   HCT 27.6* 26.7* 27.0*    278 321   SODIUM 134* 137 136   K 3.4* 3.8 3.7    104 106   CO2 21 24 18*   BUN 6 6  4*   CREATININE 0.53* 0.57* 0.62   GLUC 106 128 88   CAIONIZED 1.11* 1.10* 1.14   MG 1.8* 1.9  --    PHOS 2.6  --   --            VTE Pharmacologic Prophylaxis: Enoxaparin (Lovenox)  VTE Mechanical Prophylaxis: sequential compression device

## 2025-02-15 LAB
ABO GROUP BLD BPU: NORMAL
ANION GAP SERPL CALCULATED.3IONS-SCNC: 11 MMOL/L (ref 4–13)
ANISOCYTOSIS BLD QL SMEAR: PRESENT
BASOPHILS # BLD MANUAL: 0.18 THOUSAND/UL (ref 0–0.1)
BASOPHILS NFR MAR MANUAL: 1 % (ref 0–1)
BPU ID: NORMAL
BUN SERPL-MCNC: 11 MG/DL (ref 5–25)
BURR CELLS BLD QL SMEAR: PRESENT
CALCIUM SERPL-MCNC: 8.1 MG/DL (ref 8.4–10.2)
CHLORIDE SERPL-SCNC: 110 MMOL/L (ref 96–108)
CO2 SERPL-SCNC: 15 MMOL/L (ref 21–32)
CREAT SERPL-MCNC: 0.83 MG/DL (ref 0.6–1.3)
CROSSMATCH: NORMAL
EOSINOPHIL # BLD MANUAL: 0 THOUSAND/UL (ref 0–0.4)
EOSINOPHIL NFR BLD MANUAL: 0 % (ref 0–6)
ERYTHROCYTE [DISTWIDTH] IN BLOOD BY AUTOMATED COUNT: 14.9 % (ref 11.6–15.1)
ERYTHROCYTE [DISTWIDTH] IN BLOOD BY AUTOMATED COUNT: 14.9 % (ref 11.6–15.1)
GFR SERPL CREATININE-BSD FRML MDRD: 88 ML/MIN/1.73SQ M
GIANT PLATELETS BLD QL SMEAR: PRESENT
GLUCOSE SERPL-MCNC: 128 MG/DL (ref 65–140)
GLUCOSE SERPL-MCNC: 129 MG/DL (ref 65–140)
GLUCOSE SERPL-MCNC: 150 MG/DL (ref 65–140)
GLUCOSE SERPL-MCNC: 167 MG/DL (ref 65–140)
GLUCOSE SERPL-MCNC: 170 MG/DL (ref 65–140)
GLUCOSE SERPL-MCNC: 181 MG/DL (ref 65–140)
GLUCOSE SERPL-MCNC: 187 MG/DL (ref 65–140)
HCT VFR BLD AUTO: 22.4 % (ref 36.5–49.3)
HCT VFR BLD AUTO: 22.9 % (ref 36.5–49.3)
HELMET CELLS BLD QL SMEAR: PRESENT
HGB BLD-MCNC: 7.1 G/DL (ref 12–17)
HGB BLD-MCNC: 7.2 G/DL (ref 12–17)
LYMPHOCYTES # BLD AUTO: 11 % (ref 14–44)
LYMPHOCYTES # BLD AUTO: 2.22 THOUSAND/UL (ref 0.6–4.47)
MAGNESIUM SERPL-MCNC: 2 MG/DL (ref 1.9–2.7)
MCH RBC QN AUTO: 29.1 PG (ref 26.8–34.3)
MCH RBC QN AUTO: 29.4 PG (ref 26.8–34.3)
MCHC RBC AUTO-ENTMCNC: 31.4 G/DL (ref 31.4–37.4)
MCHC RBC AUTO-ENTMCNC: 31.7 G/DL (ref 31.4–37.4)
MCV RBC AUTO: 92 FL (ref 82–98)
MCV RBC AUTO: 94 FL (ref 82–98)
METAMYELOCYTE ABSOLUTE CT: 0.18 THOUSAND/UL (ref 0–0.1)
METAMYELOCYTES NFR BLD MANUAL: 1 % (ref 0–1)
MONOCYTES # BLD AUTO: 1.29 THOUSAND/UL (ref 0–1.22)
MONOCYTES NFR BLD: 7 % (ref 4–12)
NEUTROPHILS # BLD MANUAL: 14.58 THOUSAND/UL (ref 1.85–7.62)
NEUTS BAND NFR BLD MANUAL: 6 % (ref 0–8)
NEUTS SEG NFR BLD AUTO: 73 % (ref 43–75)
OVALOCYTES BLD QL SMEAR: PRESENT
PHOSPHATE SERPL-MCNC: 3.6 MG/DL (ref 2.3–4.1)
PLASMA CELLS NFR BLD: 1 % (ref 0–0)
PLATELET # BLD AUTO: 461 THOUSANDS/UL (ref 149–390)
PLATELET # BLD AUTO: 469 THOUSANDS/UL (ref 149–390)
PLATELET BLD QL SMEAR: ABNORMAL
PLATELET CLUMP BLD QL SMEAR: PRESENT
PMV BLD AUTO: 9.2 FL (ref 8.9–12.7)
PMV BLD AUTO: 9.3 FL (ref 8.9–12.7)
POLYCHROMASIA BLD QL SMEAR: PRESENT
POTASSIUM SERPL-SCNC: 4.4 MMOL/L (ref 3.5–5.3)
RBC # BLD AUTO: 2.44 MILLION/UL (ref 3.88–5.62)
RBC # BLD AUTO: 2.45 MILLION/UL (ref 3.88–5.62)
RBC MORPH BLD: PRESENT
SCHISTOCYTES BLD QL SMEAR: PRESENT
SODIUM SERPL-SCNC: 136 MMOL/L (ref 135–147)
TOXIC GRANULES BLD QL SMEAR: PRESENT
UNIT DISPENSE STATUS: NORMAL
UNIT PRODUCT CODE: NORMAL
UNIT PRODUCT VOLUME: 350 ML
UNIT RH: NORMAL
WBC # BLD AUTO: 16.47 THOUSAND/UL (ref 4.31–10.16)
WBC # BLD AUTO: 18.46 THOUSAND/UL (ref 4.31–10.16)

## 2025-02-15 PROCEDURE — 30233N1 TRANSFUSION OF NONAUTOLOGOUS RED BLOOD CELLS INTO PERIPHERAL VEIN, PERCUTANEOUS APPROACH: ICD-10-PCS | Performed by: STUDENT IN AN ORGANIZED HEALTH CARE EDUCATION/TRAINING PROGRAM

## 2025-02-15 PROCEDURE — 84100 ASSAY OF PHOSPHORUS: CPT | Performed by: STUDENT IN AN ORGANIZED HEALTH CARE EDUCATION/TRAINING PROGRAM

## 2025-02-15 PROCEDURE — 83735 ASSAY OF MAGNESIUM: CPT | Performed by: STUDENT IN AN ORGANIZED HEALTH CARE EDUCATION/TRAINING PROGRAM

## 2025-02-15 PROCEDURE — 82948 REAGENT STRIP/BLOOD GLUCOSE: CPT

## 2025-02-15 PROCEDURE — 80048 BASIC METABOLIC PNL TOTAL CA: CPT | Performed by: STUDENT IN AN ORGANIZED HEALTH CARE EDUCATION/TRAINING PROGRAM

## 2025-02-15 PROCEDURE — 85027 COMPLETE CBC AUTOMATED: CPT | Performed by: STUDENT IN AN ORGANIZED HEALTH CARE EDUCATION/TRAINING PROGRAM

## 2025-02-15 PROCEDURE — 99024 POSTOP FOLLOW-UP VISIT: CPT | Performed by: SURGERY

## 2025-02-15 PROCEDURE — P9016 RBC LEUKOCYTES REDUCED: HCPCS

## 2025-02-15 PROCEDURE — 85007 BL SMEAR W/DIFF WBC COUNT: CPT | Performed by: STUDENT IN AN ORGANIZED HEALTH CARE EDUCATION/TRAINING PROGRAM

## 2025-02-15 RX ORDER — PRAVASTATIN SODIUM 80 MG/1
80 TABLET ORAL
Status: DISCONTINUED | OUTPATIENT
Start: 2025-02-15 | End: 2025-02-19 | Stop reason: HOSPADM

## 2025-02-15 RX ADMIN — ACETAMINOPHEN 975 MG: 325 TABLET, FILM COATED ORAL at 17:14

## 2025-02-15 RX ADMIN — PIPERACILLIN AND TAZOBACTAM 4.5 G: 36; 4.5 INJECTION, POWDER, LYOPHILIZED, FOR SOLUTION INTRAVENOUS at 21:03

## 2025-02-15 RX ADMIN — INSULIN LISPRO 1 UNITS: 100 INJECTION, SOLUTION INTRAVENOUS; SUBCUTANEOUS at 23:41

## 2025-02-15 RX ADMIN — HYDROMORPHONE HYDROCHLORIDE 0.5 MG: 1 INJECTION, SOLUTION INTRAMUSCULAR; INTRAVENOUS; SUBCUTANEOUS at 14:10

## 2025-02-15 RX ADMIN — METHOCARBAMOL 500 MG: 500 TABLET ORAL at 06:05

## 2025-02-15 RX ADMIN — GABAPENTIN 300 MG: 300 CAPSULE ORAL at 08:23

## 2025-02-15 RX ADMIN — PIPERACILLIN AND TAZOBACTAM 4.5 G: 36; 4.5 INJECTION, POWDER, LYOPHILIZED, FOR SOLUTION INTRAVENOUS at 13:00

## 2025-02-15 RX ADMIN — ENOXAPARIN SODIUM 40 MG: 40 INJECTION SUBCUTANEOUS at 08:23

## 2025-02-15 RX ADMIN — GABAPENTIN 300 MG: 300 CAPSULE ORAL at 21:03

## 2025-02-15 RX ADMIN — HYDROMORPHONE HYDROCHLORIDE 0.5 MG: 1 INJECTION, SOLUTION INTRAMUSCULAR; INTRAVENOUS; SUBCUTANEOUS at 17:53

## 2025-02-15 RX ADMIN — INSULIN LISPRO 1 UNITS: 100 INJECTION, SOLUTION INTRAVENOUS; SUBCUTANEOUS at 00:22

## 2025-02-15 RX ADMIN — GABAPENTIN 300 MG: 300 CAPSULE ORAL at 17:00

## 2025-02-15 RX ADMIN — ACETAMINOPHEN 975 MG: 325 TABLET, FILM COATED ORAL at 00:22

## 2025-02-15 RX ADMIN — PIPERACILLIN AND TAZOBACTAM 4.5 G: 36; 4.5 INJECTION, POWDER, LYOPHILIZED, FOR SOLUTION INTRAVENOUS at 04:14

## 2025-02-15 RX ADMIN — ACETAMINOPHEN 975 MG: 325 TABLET, FILM COATED ORAL at 23:36

## 2025-02-15 RX ADMIN — METHOCARBAMOL 500 MG: 500 TABLET ORAL at 23:36

## 2025-02-15 RX ADMIN — HYDROMORPHONE HYDROCHLORIDE 0.5 MG: 1 INJECTION, SOLUTION INTRAMUSCULAR; INTRAVENOUS; SUBCUTANEOUS at 23:33

## 2025-02-15 RX ADMIN — INSULIN LISPRO 1 UNITS: 100 INJECTION, SOLUTION INTRAVENOUS; SUBCUTANEOUS at 06:05

## 2025-02-15 RX ADMIN — PRAVASTATIN SODIUM 80 MG: 80 TABLET ORAL at 17:15

## 2025-02-15 RX ADMIN — ACETAMINOPHEN 975 MG: 325 TABLET, FILM COATED ORAL at 13:00

## 2025-02-15 RX ADMIN — ACETAMINOPHEN 975 MG: 325 TABLET, FILM COATED ORAL at 06:05

## 2025-02-15 RX ADMIN — METHOCARBAMOL 500 MG: 500 TABLET ORAL at 00:22

## 2025-02-15 RX ADMIN — TAMSULOSIN HYDROCHLORIDE 0.4 MG: 0.4 CAPSULE ORAL at 08:23

## 2025-02-15 RX ADMIN — METHOCARBAMOL 500 MG: 500 TABLET ORAL at 13:00

## 2025-02-15 RX ADMIN — HYDROMORPHONE HYDROCHLORIDE 0.5 MG: 1 INJECTION, SOLUTION INTRAMUSCULAR; INTRAVENOUS; SUBCUTANEOUS at 08:23

## 2025-02-15 RX ADMIN — METHOCARBAMOL 500 MG: 500 TABLET ORAL at 17:14

## 2025-02-15 NOTE — ASSESSMENT & PLAN NOTE
70 yo male with right sided perforated diverticulitis, now s/p lap hand assisted ileocecal resection.    Plan  Liquid diet  Follow-up intraoperative pathology  Continue Zosyn until 2/18  PRN pain meds and anti nausea meds  DVT prophylaxis  Monitor hemoglobin  Will transfuse for hemoglobin less than 7

## 2025-02-15 NOTE — ANESTHESIA POSTPROCEDURE EVALUATION
Post-Op Assessment Note    CV Status:  Stable  Pain Score: 0    Pain management: adequate       Mental Status:  Arousable and sleepy   Hydration Status:  Euvolemic   PONV Controlled:  Controlled   Airway Patency:  Patent  Airway: intubated     Post Op Vitals Reviewed: Yes    No anethesia notable event occurred.    Staff: Anesthesiologist           Last Filed PACU Vitals:  Vitals Value Taken Time   Temp 97.8 °F (36.6 °C) 02/14/25 1835   Pulse 108 02/14/25 1932   /70 02/14/25 1927   Resp 16 02/14/25 1927   SpO2 99 % 02/14/25 1932   Vitals shown include unfiled device data.    Modified [place]:     Vitals Value Taken Time   Activity 2 02/14/25 1835   Respiration 2 02/14/25 1835   Circulation 2 02/14/25 1835   Consciousness 1 02/14/25 1835   Oxygen Saturation 2 02/14/25 1835     Modified [place] Score: 9

## 2025-02-15 NOTE — PLAN OF CARE
Problem: Potential for Falls  Goal: Patient will remain free of falls  Description: INTERVENTIONS:  - Educate patient/family on patient safety including physical limitations  - Instruct patient to call for assistance with activity   - Consult OT/PT to assist with strengthening/mobility   - Keep Call bell within reach  - Keep bed low and locked with side rails adjusted as appropriate  - Keep care items and personal belongings within reach  - Initiate and maintain comfort rounds  - Make Fall Risk Sign visible to staff  - Offer Toileting every 2 Hours, in advance of need  - Initiate/Maintain bed alarm  - Obtain necessary fall risk management equipment: alarms  - Apply yellow socks and bracelet for high fall risk patients  - Consider moving patient to room near nurses station  2/15/2025 1136 by Lucy Vargas RN  Outcome: Progressing  Problem: MUSCULOSKELETAL - ADULT  Goal: Maintain or return mobility to safest level of function  Description: INTERVENTIONS:  - Assess patient's ability to carry out ADLs; assess patient's baseline for ADL function and identify physical deficits which impact ability to perform ADLs (bathing, care of mouth/teeth, toileting, grooming, dressing, etc.)  - Assess/evaluate cause of self-care deficits   - Assess range of motion  - Assess patient's mobility  - Assess patient's need for assistive devices and provide as appropriate  - Encourage maximum independence but intervene and supervise when necessary  - Involve family in performance of ADLs  - Assess for home care needs following discharge   - Consider OT consult to assist with ADL evaluation and planning for discharge  - Provide patient education as appropriate  2/15/2025 1136 by Lucy Vargas RN  Outcome: Progressing    Problem: Nutrition/Hydration-ADULT  Goal: Nutrient/Hydration intake appropriate for improving, restoring or maintaining nutritional needs  Description: Monitor and assess patient's nutrition/hydration status for  malnutrition. Collaborate with interdisciplinary team and initiate plan and interventions as ordered.  Monitor patient's weight and dietary intake as ordered or per policy. Utilize nutrition screening tool and intervene as necessary. Determine patient's food preferences and provide high-protein, high-caloric foods as appropriate.     INTERVENTIONS:  - Monitor oral intake, urinary output, labs, and treatment plans  - Assess nutrition and hydration status and recommend course of action  - Evaluate amount of meals eaten  - Assist patient with eating if necessary   - Allow adequate time for meals  - Recommend/ encourage appropriate diets, oral nutritional supplements, and vitamin/mineral supplements  - Order, calculate, and assess calorie counts as needed  - Recommend, monitor, and adjust tube feedings and TPN/PPN based on assessed needs  - Assess need for intravenous fluids  - Provide specific nutrition/hydration education as appropriate  - Include patient/family/caregiver in decisions related to nutrition  2/15/2025 1136 by Lucy Vargas RN  Outcome: Progressing  2/15/2025 1135 by Lucy Vargas RN  Outcome: Progressing     Problem: Prexisting or High Potential for Compromised Skin Integrity  Goal: Skin integrity is maintained or improved  Description: INTERVENTIONS:  - Identify patients at risk for skin breakdown  - Assess and monitor skin integrity  - Assess and monitor nutrition and hydration status  - Monitor labs   - Assess for incontinence   - Turn and reposition patient  - Assist with mobility/ambulation  - Relieve pressure over bony prominences  - Avoid friction and shearing  - Provide appropriate hygiene as needed including keeping skin clean and dry  - Evaluate need for skin moisturizer/barrier cream  - Collaborate with interdisciplinary team   - Patient/family teaching  - Consider wound care consult   2/15/2025 1136 by Lucy Vargas RN  Outcome: Progressing     Problem: Knowledge Deficit  Goal:  Patient/family/caregiver demonstrates understanding of disease process, treatment plan, medications, and discharge instructions  Description: Complete learning assessment and assess knowledge base.  Interventions:  - Provide teaching at level of understanding  - Provide teaching via preferred learning methods  2/15/2025 1136 by Lucy Vargas RN  Outcome: Progressing

## 2025-02-15 NOTE — PROGRESS NOTES
Progress Note - Surgery-General   Name: Duncan Sánchez 69 y.o. male I MRN: [id number]  Unit/Bed#: [place] [address] -01 I Date of Admission: 2/9/2025   Date of Service: 2/15/2025 I Hospital Day: 6    Assessment & Plan  Diverticulitis  68 yo male with right sided perforated diverticulitis, now s/p lap hand assisted ileocecal resection.    Plan  Liquid diet  Follow-up intraoperative pathology  Continue Zosyn until 2/18  PRN pain meds and anti nausea meds  DVT prophylaxis  Monitor hemoglobin  Will transfuse for hemoglobin less than 7    Please contact the SecureChat role for the Surgery-General service with any questions/concerns.    Subjective   Patient states he feels all right this morning.  Has some right-sided abdominal pain but otherwise pain is controlled.  Denies any nausea or emesis.    Objective :  Temp:  [97.1 °F (36.2 °C)-98.7 °F (37.1 °C)] 98.7 °F (37.1 °C)  HR:  [] 100  BP: (121-157)/(58-70) 156/65  Resp:  [16-20] 16  SpO2:  [97 %-100 %] 99 %  O2 Device: None (Room air)  Nasal Cannula O2 Flow Rate (L/min):  [2 L/min-4 L/min] 2 L/min    I/O         02/13 0701  02/14 0700 02/14 0701  02/15 0700 02/15 0701  02/16 0700    P.O.  100 0    I.V. (mL/kg) 2073.8 (30.6) 2000 (29.5)     IV Piggyback  150     Total Intake(mL/kg) 2073.8 (30.6) 2250 (33.2) 0 (0)    Urine (mL/kg/hr) 450 (0.3) 2350 (1.4)     Blood  350     Total Output 450 2700     Net +1623.8 -450 0                   Physical Exam  Vitals reviewed.   HENT:      Head: Normocephalic.      Mouth/Throat:      Mouth: Mucous membranes are moist.   Eyes:      Pupils: Pupils are equal, round, and reactive to light.   Cardiovascular:      Rate and Rhythm: Normal rate.   Pulmonary:      Effort: Pulmonary effort is normal. No respiratory distress.   Abdominal:      General: There is distension (Mild).      Palpations: Abdomen is soft.      Tenderness: There is abdominal tenderness (Right-sided).   Musculoskeletal:         General: Normal range of motion.       Cervical back: Normal range of motion.   Skin:     General: Skin is warm.      Capillary Refill: Capillary refill takes 2 to 3 seconds.   Neurological:      General: No focal deficit present.      Mental Status: He is alert and oriented to person, place, and time.           Lab Results: I have reviewed the following results:  Recent Labs     02/14/25  0638 02/14/25  1653 02/15/25  0831   WBC 10.02  --  18.46*   HGB 8.8*   < > 7.2*   HCT 27.0*   < > 22.9*     --  469*   BANDSPCT 3  --   --    SODIUM 136  --  136   K 3.7  --  4.4     --  110*   CO2 18*  --  15*   BUN 4*  --  11   CREATININE 0.62  --  0.83   GLUC 88  --  129   CAIONIZED 1.14  --   --    MG  --   --  2.0   PHOS  --   --  3.6    < > = values in this interval not displayed.       Imaging Results Review: No pertinent imaging studies reviewed.  Other Study Results Review: No additional pertinent studies reviewed.    VTE Pharmacologic Prophylaxis: VTE covered by:  enoxaparin, Subcutaneous, 40 mg at 02/15/25 0823     VTE Mechanical Prophylaxis: sequential compression device

## 2025-02-16 LAB
BASOPHILS # BLD AUTO: 0.06 THOUSANDS/ΜL (ref 0–0.1)
BASOPHILS NFR BLD AUTO: 0 % (ref 0–1)
EOSINOPHIL # BLD AUTO: 0.1 THOUSAND/ΜL (ref 0–0.61)
EOSINOPHIL NFR BLD AUTO: 1 % (ref 0–6)
ERYTHROCYTE [DISTWIDTH] IN BLOOD BY AUTOMATED COUNT: 15.3 % (ref 11.6–15.1)
ERYTHROCYTE [DISTWIDTH] IN BLOOD BY AUTOMATED COUNT: 15.6 % (ref 11.6–15.1)
GLUCOSE SERPL-MCNC: 105 MG/DL (ref 65–140)
GLUCOSE SERPL-MCNC: 125 MG/DL (ref 65–140)
GLUCOSE SERPL-MCNC: 219 MG/DL (ref 65–140)
GLUCOSE SERPL-MCNC: 265 MG/DL (ref 65–140)
HCT VFR BLD AUTO: 27.1 % (ref 36.5–49.3)
HCT VFR BLD AUTO: 29.9 % (ref 36.5–49.3)
HGB BLD-MCNC: 8.8 G/DL (ref 12–17)
HGB BLD-MCNC: 9.9 G/DL (ref 12–17)
IMM GRANULOCYTES # BLD AUTO: 0.11 THOUSAND/UL (ref 0–0.2)
IMM GRANULOCYTES NFR BLD AUTO: 1 % (ref 0–2)
LYMPHOCYTES # BLD AUTO: 2.62 THOUSANDS/ΜL (ref 0.6–4.47)
LYMPHOCYTES NFR BLD AUTO: 19 % (ref 14–44)
MCH RBC QN AUTO: 29.3 PG (ref 26.8–34.3)
MCH RBC QN AUTO: 29.4 PG (ref 26.8–34.3)
MCHC RBC AUTO-ENTMCNC: 32.5 G/DL (ref 31.4–37.4)
MCHC RBC AUTO-ENTMCNC: 33.1 G/DL (ref 31.4–37.4)
MCV RBC AUTO: 89 FL (ref 82–98)
MCV RBC AUTO: 90 FL (ref 82–98)
MONOCYTES # BLD AUTO: 0.92 THOUSAND/ΜL (ref 0.17–1.22)
MONOCYTES NFR BLD AUTO: 7 % (ref 4–12)
NEUTROPHILS # BLD AUTO: 10.28 THOUSANDS/ΜL (ref 1.85–7.62)
NEUTS SEG NFR BLD AUTO: 72 % (ref 43–75)
NRBC BLD AUTO-RTO: 0 /100 WBCS
PLATELET # BLD AUTO: 452 THOUSANDS/UL (ref 149–390)
PLATELET # BLD AUTO: 492 THOUSANDS/UL (ref 149–390)
PMV BLD AUTO: 9 FL (ref 8.9–12.7)
PMV BLD AUTO: 9.4 FL (ref 8.9–12.7)
RBC # BLD AUTO: 3 MILLION/UL (ref 3.88–5.62)
RBC # BLD AUTO: 3.37 MILLION/UL (ref 3.88–5.62)
WBC # BLD AUTO: 14.09 THOUSAND/UL (ref 4.31–10.16)
WBC # BLD AUTO: 14.1 THOUSAND/UL (ref 4.31–10.16)

## 2025-02-16 PROCEDURE — 82948 REAGENT STRIP/BLOOD GLUCOSE: CPT

## 2025-02-16 PROCEDURE — 99024 POSTOP FOLLOW-UP VISIT: CPT | Performed by: SURGERY

## 2025-02-16 PROCEDURE — 85027 COMPLETE CBC AUTOMATED: CPT | Performed by: STUDENT IN AN ORGANIZED HEALTH CARE EDUCATION/TRAINING PROGRAM

## 2025-02-16 RX ORDER — LOSARTAN POTASSIUM 50 MG/1
50 TABLET ORAL DAILY
Status: DISCONTINUED | OUTPATIENT
Start: 2025-02-16 | End: 2025-02-19 | Stop reason: HOSPADM

## 2025-02-16 RX ADMIN — INSULIN LISPRO 2 UNITS: 100 INJECTION, SOLUTION INTRAVENOUS; SUBCUTANEOUS at 23:31

## 2025-02-16 RX ADMIN — HYDROMORPHONE HYDROCHLORIDE 0.5 MG: 1 INJECTION, SOLUTION INTRAMUSCULAR; INTRAVENOUS; SUBCUTANEOUS at 08:05

## 2025-02-16 RX ADMIN — GABAPENTIN 300 MG: 300 CAPSULE ORAL at 08:05

## 2025-02-16 RX ADMIN — PRAVASTATIN SODIUM 80 MG: 80 TABLET ORAL at 16:01

## 2025-02-16 RX ADMIN — METHOCARBAMOL 500 MG: 500 TABLET ORAL at 17:13

## 2025-02-16 RX ADMIN — PIPERACILLIN AND TAZOBACTAM 4.5 G: 36; 4.5 INJECTION, POWDER, LYOPHILIZED, FOR SOLUTION INTRAVENOUS at 20:13

## 2025-02-16 RX ADMIN — ACETAMINOPHEN 975 MG: 325 TABLET, FILM COATED ORAL at 23:31

## 2025-02-16 RX ADMIN — ENOXAPARIN SODIUM 40 MG: 40 INJECTION SUBCUTANEOUS at 08:05

## 2025-02-16 RX ADMIN — METHOCARBAMOL 500 MG: 500 TABLET ORAL at 06:07

## 2025-02-16 RX ADMIN — INSULIN LISPRO 2 UNITS: 100 INJECTION, SOLUTION INTRAVENOUS; SUBCUTANEOUS at 18:37

## 2025-02-16 RX ADMIN — PIPERACILLIN AND TAZOBACTAM 4.5 G: 36; 4.5 INJECTION, POWDER, LYOPHILIZED, FOR SOLUTION INTRAVENOUS at 12:56

## 2025-02-16 RX ADMIN — METHOCARBAMOL 500 MG: 500 TABLET ORAL at 23:31

## 2025-02-16 RX ADMIN — TAMSULOSIN HYDROCHLORIDE 0.4 MG: 0.4 CAPSULE ORAL at 08:05

## 2025-02-16 RX ADMIN — HYDROMORPHONE HYDROCHLORIDE 0.2 MG: 0.2 INJECTION, SOLUTION INTRAMUSCULAR; INTRAVENOUS; SUBCUTANEOUS at 02:22

## 2025-02-16 RX ADMIN — GABAPENTIN 300 MG: 300 CAPSULE ORAL at 22:23

## 2025-02-16 RX ADMIN — GABAPENTIN 300 MG: 300 CAPSULE ORAL at 16:01

## 2025-02-16 RX ADMIN — PIPERACILLIN AND TAZOBACTAM 4.5 G: 36; 4.5 INJECTION, POWDER, LYOPHILIZED, FOR SOLUTION INTRAVENOUS at 04:47

## 2025-02-16 RX ADMIN — METHOCARBAMOL 500 MG: 500 TABLET ORAL at 12:56

## 2025-02-16 RX ADMIN — LOSARTAN POTASSIUM 50 MG: 50 TABLET, FILM COATED ORAL at 16:01

## 2025-02-16 RX ADMIN — ACETAMINOPHEN 975 MG: 325 TABLET, FILM COATED ORAL at 06:07

## 2025-02-16 RX ADMIN — HYDROMORPHONE HYDROCHLORIDE 0.5 MG: 1 INJECTION, SOLUTION INTRAMUSCULAR; INTRAVENOUS; SUBCUTANEOUS at 22:23

## 2025-02-16 RX ADMIN — HYDROMORPHONE HYDROCHLORIDE 0.5 MG: 1 INJECTION, SOLUTION INTRAMUSCULAR; INTRAVENOUS; SUBCUTANEOUS at 16:02

## 2025-02-16 NOTE — ASSESSMENT & PLAN NOTE
70 yo male with right sided perforated diverticulitis, now s/p lap hand assisted ileocecal resection on 2/14.  Patient still having distention this morning and has not yet had return of bowel function.  Patient transfused 1 unit yesterday for downtrending hemoglobin with tachycardia.  Responded appropriately.    Plan  Liquid diet.  Will add toast and crackers  Follow-up intraoperative pathology  Continue Zosyn until 2/18 for treatment of colitis  PRN pain meds and anti nausea meds  DVT prophylaxis  Monitor hemoglobin  Will transfuse for hemoglobin less than 7

## 2025-02-16 NOTE — PROGRESS NOTES
Progress Note - Surgery-General   Name: Duncan Sánchez 69 y.o. male I MRN: [id number]  Unit/Bed#: [place] [address] -01 I Date of Admission: 2/9/2025   Date of Service: 2/16/2025 I Hospital Day: 7    Assessment & Plan  Diverticulitis  70 yo male with right sided perforated diverticulitis, now s/p lap hand assisted ileocecal resection on 2/14.  Patient still having distention this morning and has not yet had return of bowel function.  Patient transfused 1 unit yesterday for downtrending hemoglobin with tachycardia.  Responded appropriately.    Plan  Liquid diet.  Will add toast and crackers  Follow-up intraoperative pathology  Continue Zosyn until 2/18 for treatment of colitis  PRN pain meds and anti nausea meds  DVT prophylaxis  Monitor hemoglobin  Will transfuse for hemoglobin less than 7    Please contact the SecureChat role for the Surgery-General service with any questions/concerns.    Subjective   Patient feels slightly better this morning.  Complains of pain when getting up to urinate.  Denies any flatus or bowel movements yet.  Tolerating clear liquid diet.  Denies any nausea.    Objective :  Temp:  [97.3 °F (36.3 °C)-98.4 °F (36.9 °C)] 98.1 °F (36.7 °C)  HR:  [] 74  BP: ()/(64-78) 158/73  Resp:  [14-18] 16  SpO2:  [97 %-98 %] 98 %  O2 Device: None (Room air)    I/O         02/14 0701  02/15 0700 02/15 0701  02/16 0700 02/16 0701  02/17 0700    P.O. 100 340     I.V. (mL/kg) 2000 (29.5)      Blood  420     IV Piggyback 150      Total Intake(mL/kg) 2250 (33.2) 760 (11.2)     Urine (mL/kg/hr) 2350 (1.4) 550 (0.3)     Blood 350      Total Output 2700 550     Net -450 +210                    Physical Exam  Vitals reviewed.   HENT:      Head: Normocephalic.      Mouth/Throat:      Mouth: Mucous membranes are moist.   Eyes:      Pupils: Pupils are equal, round, and reactive to light.   Pulmonary:      Effort: Pulmonary effort is normal. No respiratory distress.   Abdominal:      General: There is  distension.      Palpations: Abdomen is soft.      Tenderness: There is abdominal tenderness (Right-sided abdominal tenderness).      Comments: Surgical sites are clean, dry, and intact   Musculoskeletal:         General: Normal range of motion.      Cervical back: Normal range of motion.   Skin:     General: Skin is warm.      Capillary Refill: Capillary refill takes 2 to 3 seconds.   Neurological:      General: No focal deficit present.      Mental Status: He is alert and oriented to person, place, and time. Mental status is at baseline.           Lab Results: I have reviewed the following results:  Recent Labs     02/14/25  0638 02/14/25  1653 02/15/25  0831 02/15/25  1711 02/16/25  0454   WBC 10.02  --  18.46*   < > 14.09*   HGB 8.8*   < > 7.2*   < > 8.8*   HCT 27.0*   < > 22.9*   < > 27.1*     --  469*   < > 452*   BANDSPCT 3  --  6  --   --    SODIUM 136  --  136  --   --    K 3.7  --  4.4  --   --      --  110*  --   --    CO2 18*  --  15*  --   --    BUN 4*  --  11  --   --    CREATININE 0.62  --  0.83  --   --    GLUC 88  --  129  --   --    CAIONIZED 1.14  --   --   --   --    MG  --   --  2.0  --   --    PHOS  --   --  3.6  --   --     < > = values in this interval not displayed.       Imaging Results Review: No pertinent imaging studies reviewed.  Other Study Results Review: No additional pertinent studies reviewed.    VTE Pharmacologic Prophylaxis: VTE covered by:  enoxaparin, Subcutaneous, 40 mg at 02/16/25 0805     VTE Mechanical Prophylaxis: sequential compression device

## 2025-02-17 LAB
BASOPHILS # BLD AUTO: 0.04 THOUSANDS/ΜL (ref 0–0.1)
BASOPHILS NFR BLD AUTO: 0 % (ref 0–1)
EOSINOPHIL # BLD AUTO: 0.19 THOUSAND/ΜL (ref 0–0.61)
EOSINOPHIL NFR BLD AUTO: 2 % (ref 0–6)
ERYTHROCYTE [DISTWIDTH] IN BLOOD BY AUTOMATED COUNT: 15 % (ref 11.6–15.1)
GLUCOSE SERPL-MCNC: 135 MG/DL (ref 65–140)
GLUCOSE SERPL-MCNC: 151 MG/DL (ref 65–140)
GLUCOSE SERPL-MCNC: 188 MG/DL (ref 65–140)
GLUCOSE SERPL-MCNC: 232 MG/DL (ref 65–140)
GLUCOSE SERPL-MCNC: 286 MG/DL (ref 65–140)
GLUCOSE SERPL-MCNC: 81 MG/DL (ref 65–140)
HCT VFR BLD AUTO: 28.3 % (ref 36.5–49.3)
HGB BLD-MCNC: 9.1 G/DL (ref 12–17)
IMM GRANULOCYTES # BLD AUTO: 0.12 THOUSAND/UL (ref 0–0.2)
IMM GRANULOCYTES NFR BLD AUTO: 1 % (ref 0–2)
LYMPHOCYTES # BLD AUTO: 2.48 THOUSANDS/ΜL (ref 0.6–4.47)
LYMPHOCYTES NFR BLD AUTO: 20 % (ref 14–44)
MCH RBC QN AUTO: 28.6 PG (ref 26.8–34.3)
MCHC RBC AUTO-ENTMCNC: 32.2 G/DL (ref 31.4–37.4)
MCV RBC AUTO: 89 FL (ref 82–98)
MONOCYTES # BLD AUTO: 0.85 THOUSAND/ΜL (ref 0.17–1.22)
MONOCYTES NFR BLD AUTO: 7 % (ref 4–12)
NEUTROPHILS # BLD AUTO: 8.62 THOUSANDS/ΜL (ref 1.85–7.62)
NEUTS SEG NFR BLD AUTO: 70 % (ref 43–75)
NRBC BLD AUTO-RTO: 0 /100 WBCS
PLATELET # BLD AUTO: 530 THOUSANDS/UL (ref 149–390)
PMV BLD AUTO: 9.2 FL (ref 8.9–12.7)
RBC # BLD AUTO: 3.18 MILLION/UL (ref 3.88–5.62)
WBC # BLD AUTO: 12.3 THOUSAND/UL (ref 4.31–10.16)

## 2025-02-17 PROCEDURE — NC001 PR NO CHARGE: Performed by: SURGERY

## 2025-02-17 PROCEDURE — 85025 COMPLETE CBC W/AUTO DIFF WBC: CPT | Performed by: STUDENT IN AN ORGANIZED HEALTH CARE EDUCATION/TRAINING PROGRAM

## 2025-02-17 PROCEDURE — 82948 REAGENT STRIP/BLOOD GLUCOSE: CPT

## 2025-02-17 RX ADMIN — PRAVASTATIN SODIUM 80 MG: 80 TABLET ORAL at 17:51

## 2025-02-17 RX ADMIN — PIPERACILLIN AND TAZOBACTAM 4.5 G: 36; 4.5 INJECTION, POWDER, LYOPHILIZED, FOR SOLUTION INTRAVENOUS at 21:26

## 2025-02-17 RX ADMIN — GABAPENTIN 300 MG: 300 CAPSULE ORAL at 09:09

## 2025-02-17 RX ADMIN — ACETAMINOPHEN 975 MG: 325 TABLET, FILM COATED ORAL at 05:57

## 2025-02-17 RX ADMIN — ENOXAPARIN SODIUM 40 MG: 40 INJECTION SUBCUTANEOUS at 09:09

## 2025-02-17 RX ADMIN — PIPERACILLIN AND TAZOBACTAM 4.5 G: 36; 4.5 INJECTION, POWDER, LYOPHILIZED, FOR SOLUTION INTRAVENOUS at 05:57

## 2025-02-17 RX ADMIN — GABAPENTIN 300 MG: 300 CAPSULE ORAL at 17:51

## 2025-02-17 RX ADMIN — METHOCARBAMOL 500 MG: 500 TABLET ORAL at 05:57

## 2025-02-17 RX ADMIN — METHOCARBAMOL 500 MG: 500 TABLET ORAL at 17:51

## 2025-02-17 RX ADMIN — GABAPENTIN 300 MG: 300 CAPSULE ORAL at 21:26

## 2025-02-17 RX ADMIN — ACETAMINOPHEN 975 MG: 325 TABLET, FILM COATED ORAL at 12:00

## 2025-02-17 RX ADMIN — PIPERACILLIN AND TAZOBACTAM 4.5 G: 36; 4.5 INJECTION, POWDER, LYOPHILIZED, FOR SOLUTION INTRAVENOUS at 12:00

## 2025-02-17 RX ADMIN — ACETAMINOPHEN 975 MG: 325 TABLET, FILM COATED ORAL at 17:51

## 2025-02-17 RX ADMIN — METHOCARBAMOL 500 MG: 500 TABLET ORAL at 12:00

## 2025-02-17 RX ADMIN — TAMSULOSIN HYDROCHLORIDE 0.4 MG: 0.4 CAPSULE ORAL at 09:09

## 2025-02-17 RX ADMIN — LABETALOL HYDROCHLORIDE 20 MG: 5 INJECTION, SOLUTION INTRAVENOUS at 03:08

## 2025-02-17 RX ADMIN — INSULIN LISPRO 1 UNITS: 100 INJECTION, SOLUTION INTRAVENOUS; SUBCUTANEOUS at 06:08

## 2025-02-17 RX ADMIN — LOSARTAN POTASSIUM 50 MG: 50 TABLET, FILM COATED ORAL at 09:09

## 2025-02-17 RX ADMIN — INSULIN LISPRO 2 UNITS: 100 INJECTION, SOLUTION INTRAVENOUS; SUBCUTANEOUS at 12:02

## 2025-02-17 NOTE — RESTORATIVE TECHNICIAN NOTE
Restorative Technician Note      Patient Name: Duncan Sánchez     Restorative Tech Visit Date: 02/17/25  Note Type: Mobility  Patient Position Upon Consult: Supine  Activity Performed: Ambulated; Range of motion  Assistive Device: Roller walker  Patient Position at End of Consult: All needs within reach; Bedside chair

## 2025-02-17 NOTE — NURSING NOTE
At 0230, pt was found at the side of the bed on his knees.  The pt was attempting to use the urinal and he fell.  Pt denied head strike.  No c/o of pain or discomfort.  Surgery resident and supervisor notified.

## 2025-02-17 NOTE — PROGRESS NOTES
Progress Note - Surgery-General   Name: Duncan Sánchez 69 y.o. male I MRN: [id number]  Unit/Bed#: [place] [address] -01 I Date of Admission: 2/9/2025   Date of Service: 2/17/2025 I Hospital Day: 8    Assessment & Plan  Diverticulitis  68 yo male with right sided perforated diverticulitis, now s/p lap hand assisted ileocecal resection on 2/14.  Patient still having distention this morning and has not yet had return of bowel function.  Patient transfused 1 unit 2/15 for downtrending hemoglobin with tachycardia.  Responded appropriately.  Patient suffered a fall to his knees yesterday while trying to urinate.  Denies any head strikes or injuries.    Plan  Will advance to low residue diet  Follow-up intraoperative pathology  Continue Zosyn until 2/18 for treatment of colitis  PRN pain meds and anti nausea meds  DVT prophylaxis  Monitor hemoglobin  Will transfuse for hemoglobin less than 7  PT/OT    Please contact the SecureChat role for the Surgery-General service with any questions/concerns.    Subjective   Patient feels some abdominal pain still mostly, RLQ and lower abdomen.  Denies any pain to his knees.  Denies any head strike during his fall yesterday.    Objective :  Temp:  [98.1 °F (36.7 °C)-98.3 °F (36.8 °C)] 98.1 °F (36.7 °C)  HR:  [] 80  BP: (158-198)/() 158/77  Resp:  [16-20] 20  SpO2:  [97 %-98 %] 98 %  O2 Device: None (Room air)    I/O         02/15 0701  02/16 0700 02/16 0701  02/17 0700 02/17 0701  02/18 0700    P.O. 340 480     I.V. (mL/kg)       Blood 420      IV Piggyback       Total Intake(mL/kg) 760 (11.2) 480 (7.1)     Urine (mL/kg/hr) 550 (0.3) 2600 (1.6)     Blood       Total Output 550 2600     Net +210 -2120                    Physical Exam  Vitals reviewed.   HENT:      Mouth/Throat:      Mouth: Mucous membranes are moist.   Eyes:      Pupils: Pupils are equal, round, and reactive to light.   Cardiovascular:      Rate and Rhythm: Normal rate.   Pulmonary:      Effort: Pulmonary  effort is normal. No respiratory distress.   Abdominal:      Comments: Lower abdomen is tender.  Decreased abdominal distention.  Surgical sites are clean, dry, intact   Musculoskeletal:         General: No swelling, tenderness, deformity or signs of injury. Normal range of motion.   Skin:     General: Skin is warm.      Capillary Refill: Capillary refill takes 2 to 3 seconds.   Neurological:      General: No focal deficit present.      Mental Status: He is alert and oriented to person, place, and time. Mental status is at baseline.           Lab Results: I have reviewed the following results:  Recent Labs     02/15/25  0831 02/15/25  1711 02/17/25  0600   WBC 18.46*   < > 12.30*   HGB 7.2*   < > 9.1*   HCT 22.9*   < > 28.3*   *   < > 530*   BANDSPCT 6  --   --    SODIUM 136  --   --    K 4.4  --   --    *  --   --    CO2 15*  --   --    BUN 11  --   --    CREATININE 0.83  --   --    GLUC 129  --   --    MG 2.0  --   --    PHOS 3.6  --   --     < > = values in this interval not displayed.       Imaging Results Review: No pertinent imaging studies reviewed.  Other Study Results Review: No additional pertinent studies reviewed.    VTE Pharmacologic Prophylaxis: VTE covered by:  enoxaparin, Subcutaneous, 40 mg at 02/16/25 0805     VTE Mechanical Prophylaxis: sequential compression device

## 2025-02-17 NOTE — ASSESSMENT & PLAN NOTE
70 yo male with right sided perforated diverticulitis, now s/p lap hand assisted ileocecal resection on 2/14.  Patient still having distention this morning and has not yet had return of bowel function.  Patient transfused 1 unit 2/15 for downtrending hemoglobin with tachycardia.  Responded appropriately.  Patient suffered a fall to his knees yesterday while trying to urinate.  Denies any head strikes or injuries.    Plan  Will advance to low residue diet  Follow-up intraoperative pathology  Continue Zosyn until 2/18 for treatment of colitis  PRN pain meds and anti nausea meds  DVT prophylaxis  Monitor hemoglobin  Will transfuse for hemoglobin less than 7  PT/OT

## 2025-02-18 LAB
ABO GROUP BLD BPU: NORMAL
BPU ID: NORMAL
CROSSMATCH: NORMAL
GLUCOSE SERPL-MCNC: 156 MG/DL (ref 65–140)
GLUCOSE SERPL-MCNC: 169 MG/DL (ref 65–140)
GLUCOSE SERPL-MCNC: 178 MG/DL (ref 65–140)
GLUCOSE SERPL-MCNC: 186 MG/DL (ref 65–140)
GLUCOSE SERPL-MCNC: 266 MG/DL (ref 65–140)
GLUCOSE SERPL-MCNC: 273 MG/DL (ref 65–140)
UNIT DISPENSE STATUS: NORMAL
UNIT PRODUCT CODE: NORMAL
UNIT PRODUCT VOLUME: 300 ML
UNIT PRODUCT VOLUME: 350 ML
UNIT PRODUCT VOLUME: 350 ML
UNIT RH: NORMAL

## 2025-02-18 PROCEDURE — 97168 OT RE-EVAL EST PLAN CARE: CPT

## 2025-02-18 PROCEDURE — 82948 REAGENT STRIP/BLOOD GLUCOSE: CPT

## 2025-02-18 PROCEDURE — 99024 POSTOP FOLLOW-UP VISIT: CPT

## 2025-02-18 PROCEDURE — 97164 PT RE-EVAL EST PLAN CARE: CPT

## 2025-02-18 RX ORDER — INSULIN LISPRO 100 [IU]/ML
1-5 INJECTION, SOLUTION INTRAVENOUS; SUBCUTANEOUS
Status: DISCONTINUED | OUTPATIENT
Start: 2025-02-18 | End: 2025-02-19 | Stop reason: HOSPADM

## 2025-02-18 RX ADMIN — PIPERACILLIN AND TAZOBACTAM 4.5 G: 36; 4.5 INJECTION, POWDER, LYOPHILIZED, FOR SOLUTION INTRAVENOUS at 05:26

## 2025-02-18 RX ADMIN — ACETAMINOPHEN 975 MG: 325 TABLET, FILM COATED ORAL at 00:37

## 2025-02-18 RX ADMIN — INSULIN LISPRO 1 UNITS: 100 INJECTION, SOLUTION INTRAVENOUS; SUBCUTANEOUS at 00:38

## 2025-02-18 RX ADMIN — HYDROMORPHONE HYDROCHLORIDE 0.5 MG: 1 INJECTION, SOLUTION INTRAMUSCULAR; INTRAVENOUS; SUBCUTANEOUS at 21:39

## 2025-02-18 RX ADMIN — LABETALOL HYDROCHLORIDE 20 MG: 5 INJECTION, SOLUTION INTRAVENOUS at 23:15

## 2025-02-18 RX ADMIN — PIPERACILLIN AND TAZOBACTAM 4.5 G: 36; 4.5 INJECTION, POWDER, LYOPHILIZED, FOR SOLUTION INTRAVENOUS at 21:34

## 2025-02-18 RX ADMIN — GABAPENTIN 300 MG: 300 CAPSULE ORAL at 16:41

## 2025-02-18 RX ADMIN — INSULIN LISPRO 2 UNITS: 100 INJECTION, SOLUTION INTRAVENOUS; SUBCUTANEOUS at 12:53

## 2025-02-18 RX ADMIN — PRAVASTATIN SODIUM 80 MG: 80 TABLET ORAL at 16:41

## 2025-02-18 RX ADMIN — PIPERACILLIN AND TAZOBACTAM 4.5 G: 36; 4.5 INJECTION, POWDER, LYOPHILIZED, FOR SOLUTION INTRAVENOUS at 13:45

## 2025-02-18 RX ADMIN — ACETAMINOPHEN 975 MG: 325 TABLET, FILM COATED ORAL at 17:45

## 2025-02-18 RX ADMIN — INSULIN LISPRO 1 UNITS: 100 INJECTION, SOLUTION INTRAVENOUS; SUBCUTANEOUS at 06:34

## 2025-02-18 RX ADMIN — LABETALOL HYDROCHLORIDE 20 MG: 5 INJECTION, SOLUTION INTRAVENOUS at 16:35

## 2025-02-18 RX ADMIN — ACETAMINOPHEN 975 MG: 325 TABLET, FILM COATED ORAL at 06:34

## 2025-02-18 RX ADMIN — METHOCARBAMOL 500 MG: 500 TABLET ORAL at 06:34

## 2025-02-18 RX ADMIN — GABAPENTIN 300 MG: 300 CAPSULE ORAL at 21:33

## 2025-02-18 RX ADMIN — INSULIN LISPRO 1 UNITS: 100 INJECTION, SOLUTION INTRAVENOUS; SUBCUTANEOUS at 17:45

## 2025-02-18 RX ADMIN — METHOCARBAMOL 500 MG: 500 TABLET ORAL at 12:53

## 2025-02-18 RX ADMIN — ENOXAPARIN SODIUM 40 MG: 40 INJECTION SUBCUTANEOUS at 10:49

## 2025-02-18 RX ADMIN — ACETAMINOPHEN 975 MG: 325 TABLET, FILM COATED ORAL at 12:53

## 2025-02-18 RX ADMIN — GABAPENTIN 300 MG: 300 CAPSULE ORAL at 10:48

## 2025-02-18 RX ADMIN — METHOCARBAMOL 500 MG: 500 TABLET ORAL at 23:26

## 2025-02-18 RX ADMIN — TAMSULOSIN HYDROCHLORIDE 0.4 MG: 0.4 CAPSULE ORAL at 10:49

## 2025-02-18 RX ADMIN — METHOCARBAMOL 500 MG: 500 TABLET ORAL at 00:37

## 2025-02-18 RX ADMIN — LOSARTAN POTASSIUM 50 MG: 50 TABLET, FILM COATED ORAL at 10:48

## 2025-02-18 RX ADMIN — METHOCARBAMOL 500 MG: 500 TABLET ORAL at 16:41

## 2025-02-18 NOTE — PLAN OF CARE
Problem: Potential for Falls  Goal: Patient will remain free of falls  Description: INTERVENTIONS:  - Educate patient/family on patient safety including physical limitations  - Instruct patient to call for assistance with activity   - Consult OT/PT to assist with strengthening/mobility   - Keep Call bell within reach  - Keep bed low and locked with side rails adjusted as appropriate  - Keep care items and personal belongings within reach  - Initiate and maintain comfort rounds  - Make Fall Risk Sign visible to staff  - Offer Toileting every 2 Hours, in advance of need  - Initiate/Maintain bed alarm  - Obtain necessary fall risk management equipment: alarms  - Apply yellow socks and bracelet for high fall risk patients  - Consider moving patient to room near nurses station  Outcome: Progressing     Problem: MUSCULOSKELETAL - ADULT  Goal: Maintain or return mobility to safest level of function  Description: INTERVENTIONS:  - Assess patient's ability to carry out ADLs; assess patient's baseline for ADL function and identify physical deficits which impact ability to perform ADLs (bathing, care of mouth/teeth, toileting, grooming, dressing, etc.)  - Assess/evaluate cause of self-care deficits   - Assess range of motion  - Assess patient's mobility  - Assess patient's need for assistive devices and provide as appropriate  - Encourage maximum independence but intervene and supervise when necessary  - Involve family in performance of ADLs  - Assess for home care needs following discharge   - Consider OT consult to assist with ADL evaluation and planning for discharge  - Provide patient education as appropriate  Outcome: Progressing     Problem: DISCHARGE PLANNING  Goal: Discharge to home or other facility with appropriate resources  Description: INTERVENTIONS:  - Identify barriers to discharge w/patient and caregiver  - Arrange for needed discharge resources and transportation as appropriate  - Identify discharge learning  needs (meds, wound care, etc.)  - Arrange for interpretive services to assist at discharge as needed  - Refer to Case Management Department for coordinating discharge planning if the patient needs post-hospital services based on physician/advanced practitioner order or complex needs related to functional status, cognitive ability, or social support system  Outcome: Progressing     Problem: Knowledge Deficit  Goal: Patient/family/caregiver demonstrates understanding of disease process, treatment plan, medications, and discharge instructions  Description: Complete learning assessment and assess knowledge base.  Interventions:  - Provide teaching at level of understanding  - Provide teaching via preferred learning methods  Outcome: Progressing     Problem: Prexisting or High Potential for Compromised Skin Integrity  Goal: Skin integrity is maintained or improved  Description: INTERVENTIONS:  - Identify patients at risk for skin breakdown  - Assess and monitor skin integrity  - Assess and monitor nutrition and hydration status  - Monitor labs   - Assess for incontinence   - Turn and reposition patient  - Assist with mobility/ambulation  - Relieve pressure over bony prominences  - Avoid friction and shearing  - Provide appropriate hygiene as needed including keeping skin clean and dry  - Evaluate need for skin moisturizer/barrier cream  - Collaborate with interdisciplinary team   - Patient/family teaching  - Consider wound care consult   Outcome: Progressing     Problem: Nutrition/Hydration-ADULT  Goal: Nutrient/Hydration intake appropriate for improving, restoring or maintaining nutritional needs  Description: Monitor and assess patient's nutrition/hydration status for malnutrition. Collaborate with interdisciplinary team and initiate plan and interventions as ordered.  Monitor patient's weight and dietary intake as ordered or per policy. Utilize nutrition screening tool and intervene as necessary. Determine patient's  food preferences and provide high-protein, high-caloric foods as appropriate.     INTERVENTIONS:  - Monitor oral intake, urinary output, labs, and treatment plans  - Assess nutrition and hydration status and recommend course of action  - Evaluate amount of meals eaten  - Assist patient with eating if necessary   - Allow adequate time for meals  - Recommend/ encourage appropriate diets, oral nutritional supplements, and vitamin/mineral supplements  - Order, calculate, and assess calorie counts as needed  - Recommend, monitor, and adjust tube feedings and TPN/PPN based on assessed needs  - Assess need for intravenous fluids  - Provide specific nutrition/hydration education as appropriate  - Include patient/family/caregiver in decisions related to nutrition  Outcome: Progressing     Problem: GASTROINTESTINAL - ADULT  Goal: Minimal or absence of nausea and/or vomiting  Description: INTERVENTIONS:  - Administer IV fluids if ordered to ensure adequate hydration  - Maintain NPO status until nausea and vomiting are resolved  - Nasogastric tube if ordered  - Administer ordered antiemetic medications as needed  - Provide nonpharmacologic comfort measures as appropriate  - Advance diet as tolerated, if ordered  - Consider nutrition services referral to assist patient with adequate nutrition and appropriate food choices  Outcome: Progressing  Goal: Maintains or returns to baseline bowel function  Description: INTERVENTIONS:  - Assess bowel function  - Encourage oral fluids to ensure adequate hydration  - Administer IV fluids if ordered to ensure adequate hydration  - Administer ordered medications as needed  - Encourage mobilization and activity  - Consider nutritional services referral to assist patient with adequate nutrition and appropriate food choices  Outcome: Progressing  Goal: Maintains adequate nutritional intake  Description: INTERVENTIONS:  - Monitor percentage of each meal consumed  - Identify factors contributing  to decreased intake, treat as appropriate  - Assist with meals as needed  - Monitor I&O, weight, and lab values if indicated  - Obtain nutrition services referral as needed  Outcome: Progressing  Goal: Establish and maintain optimal ostomy function  Description: INTERVENTIONS:  - Assess bowel function  - Encourage oral fluids to ensure adequate hydration  - Administer IV fluids if ordered to ensure adequate hydration   - Administer ordered medications as needed  - Encourage mobilization and activity  - Nutrition services referral to assist patient with appropriate food choices  - Assess stoma site  - Consider wound care consult   Outcome: Progressing  Goal: Oral mucous membranes remain intact  Description: INTERVENTIONS  - Assess oral mucosa and hygiene practices  - Implement preventative oral hygiene regimen  - Implement oral medicated treatments as ordered  - Initiate Nutrition services referral as needed  Outcome: Progressing

## 2025-02-18 NOTE — PROGRESS NOTES
"Progress Note - Surgery-General   Name: Duncan Sánchez 69 y.o. male I MRN: [id number]  Unit/Bed#: [place] [address] -01 I Date of Admission: 2/9/2025   Date of Service: 2/17/2025 I Hospital Day: 8     Assessment & Plan  Diverticulitis  70 yo male with right sided perforated diverticulitis, now s/p lap hand assisted ileocecal resection on 2/14.  Patient still with pain near the incision sites as well as distention.  Patient has not had return of bowel function    2/15: transfused 1 unit for downtrending hemoglobin with tachycardia.  Responded appropriately.  2/16: fall onto knees while trying to urinate.  Denies any head strikes or injuries.    Plan  -F/u PT/OT eval  -F/u labs  -Continue diet as tolerated  -Follow-up intraoperative pathology  -Zosyn to fall off today  -PRN pain meds and anti nausea meds  -DVT prophylaxis  -Monitor hemoglobin  -Will transfuse for hemoglobin less than 7  -Serial abdominal exams  -Continue proper mgmt of intermittent HTN    24 Hour Events : No acute overnight events.  Subjective : Patient continues to have 8 out of 10 pain in the lower hemiabdomen.  He denies any nausea, vomiting, fevers or chills.  Patient has not had any bowel movements or passed gas overnight.    Objective :  Visit Vitals  /66   Pulse 89   Temp 98.4 °F (36.9 °C) (Oral)   Resp 18   Ht 5' 1\" (1.549 m)   Wt 67.8 kg (149 lb 7.6 oz)   SpO2 97%   BMI 28.24 kg/m²   Smoking Status Never   BSA 1.67 m²        Physical Exam  Constitutional:       General: He is not in acute distress.     Appearance: Normal appearance. He is obese. He is not ill-appearing, toxic-appearing or diaphoretic.   Cardiovascular:      Rate and Rhythm: Normal rate and regular rhythm.      Pulses: Normal pulses.      Heart sounds: Normal heart sounds.   Pulmonary:      Effort: Pulmonary effort is normal. No respiratory distress.      Breath sounds: Normal breath sounds.   Abdominal:      General: There is distension.      Palpations: Abdomen is soft. " There is no mass.      Tenderness: There is abdominal tenderness (Lower hemiabdominal tenderness to light and deep palpation.). There is no right CVA tenderness, left CVA tenderness, guarding or rebound.      Hernia: No hernia is present.   Neurological:      General: No focal deficit present.      Mental Status: He is alert and oriented to person, place, and time.         Lab Results: I have reviewed the following results:none      VTE Pharmacologic Prophylaxis: VTE covered by:  enoxaparin, Subcutaneous, 40 mg at 02/17/25 0909     VTE Mechanical Prophylaxis: sequential compression device

## 2025-02-18 NOTE — PHYSICAL THERAPY NOTE
PHYSICAL THERAPY RE-EVALUATION          Patient Name: Duncan Sánchez  Today's Date: 2/18/2025 02/18/25 1011   PT Last Visit   PT Visit Date 02/18/25   Note Type   Note type Re-Evaluation   Pain Assessment   Pain Assessment Tool 0-10   Pain Score 8   Pain Location/Orientation Orientation: Right;Orientation: Lower;Location: Abdomen   Restrictions/Precautions   Other Precautions Chair Alarm;Bed Alarm;Multiple lines;Fall Risk;Pain   Home Living   Additional Comments see IE 2/11   Prior Function   Comments see IE 2/11   General   Additional Pertinent History re-evaluation requested s/p fall at bedside   Cognition   Overall Cognitive Status WFL   Arousal/Participation Cooperative   Bed Mobility   Supine to Sit 6  Modified independent   Additional items HOB elevated;Bedrails;Increased time required   Sit to Supine 5  Supervision   Additional items HOB elevated;Increased time required;Verbal cues   Additional Comments cues for encouragement to promote independence   Transfers   Sit to Stand 5  Supervision   Stand to Sit 5  Supervision   Additional Comments AD   Ambulation/Elevation   Gait pattern Short stride;Excessively slow   Gait Assistance 5  Supervision   Additional items Assist x 1   Assistive Device Rolling walker;Small base quad cane   Distance 220' (about 15' w QC)   Stair Management Assistance 5  Supervision   Additional items Assist x 1   Stair Management Technique One rail L;With cane;Step to pattern;Foreward   Number of Stairs 4   Ambulation/Elevation Additional Comments ascend w LHR, descend w RHR   Balance   Static Standing Fair   Dynamic Standing Fair -   Ambulatory Fair -   Endurance Deficit   Endurance Deficit No   Activity Tolerance   Activity Tolerance Patient tolerated treatment well   Medical Staff Made Aware [facility] OT   Assessment   Prognosis Good   Problem List Decreased strength;Impaired balance;Pain   Assessment Duncan was seen for a re-evaluation secondary to fall at  bedside while trying to urinate (per chart). Noted consistent level of mobility compared to last session, including ability to ambulate community distances and negotiate steps. No LOB noted progressing from RW to QC. Did require slight increase in S compared to last session. Despite slight functional changes there continue to be no barriers to dc given his functioning at baseline and adequate social support. Will maintain on caseload to monitor function; if pt remains functioning at consistent level would consider dc back to restorative services while hospitalized.   Barriers to Discharge None   Goals   STG Expiration Date 02/28/25   Short Term Goal #1 1) Pt will perform bed mobility mod I demonstrating appropriate technique 100% of the time in order to improve function. 2) Pt will perform all transfers mod I demonstrating safe technique 100% of the time in order to improve ability to negotiate safely in home environment. 3) Amb with least restrictive AD > 200'x1 mod I in order to demonstrate ability to negotiate in home environment. 4) Improve overall strength and balance 1/2 grade in order to optimize ability to perform functional tasks and reduce fall risk. 5) Improve am-pac by 2 to demonstrate functional progress and return to baseline function/reduced caregiver burden. 6) Negotiate stairs using most appropriate technique and mod I in order to be able to negotiate safely in home environmen   Plan   Treatment/Interventions Functional transfer training;LE strengthening/ROM;Elevations;Therapeutic exercise;Patient/family training;Equipment eval/education;Bed mobility;Gait training;Continued evaluation;OT   PT Frequency Other (Comment);1-2x/wk  (f/u prn)   Discharge Recommendation   Rehab Resource Intensity Level, PT III (Minimum Resource Intensity)  (OP PT for balance)   AM-PAC Basic Mobility Inpatient   Turning in Flat Bed Without Bedrails 4   Lying on Back to Sitting on Edge of Flat Bed Without Bedrails 3   Moving  Bed to Chair 3   Standing Up From Chair Using Arms 3   Walk in Room 3   Climb 3-5 Stairs With Railing 3   Basic Mobility Inpatient Raw Score 19   Basic Mobility Standardized Score 42.48   [facility] Highest Level Of Mobility   -[facility] Goal 6: Walk 10 steps or more   -HLM Achieved 7: Walk 25 feet or more   End of Consult   Patient Position at End of Consult Supine;Bed/Chair alarm activated;All needs within reach     Sue Turner, PT

## 2025-02-18 NOTE — OCCUPATIONAL THERAPY NOTE
Occupational Therapy Re-Evaluation     Patient Name: Duncan Sánchez  Today's Date: 2/18/2025  Problem List  Principal Problem:    Diverticulitis    Past Medical History  Past Medical History:   Diagnosis Date    Benign prostatic hyperplasia 08/10/2024    History of CVA (cerebrovascular accident) 08/06/2024    Hyperlipidemia 08/06/2024    Neuropathy 09/23/2024    Primary hypertension 08/06/2024    Type 2 diabetes mellitus without complication, without long-term current use of insulin (HCC) 08/06/2024     Past Surgical History  Past Surgical History:   Procedure Laterality Date    HEMICOLOECTOMY W/ ANASTOMOSIS N/A 2/14/2025    Procedure: LAP ILEOCOLIC RESECTION, HAND-ASSISTED;  Surgeon: Pablo Conner MD;  Location: [facility];  Service: General           02/18/25 1012   OT Last Visit   OT Visit Date 02/18/25   Note Type   Note type Re-Evaluation   Pain Assessment   Pain Assessment Tool 0-10   Pain Score 8   Pain Location/Orientation Orientation: Right;Orientation: Lower;Location: Abdomen   Patient's Stated Pain Goal No pain   Hospital Pain Intervention(s) Repositioned;Ambulation/increased activity;Emotional support;Rest   Multiple Pain Sites No   Restrictions/Precautions   Weight Bearing Precautions Per Order No   Other Precautions Chair Alarm;Bed Alarm;Multiple lines;Fall Risk;Pain   Home Living   Additional Comments Please refer to initial OT eval on 2/11/25   Prior Function   Comments Please refer to initial OT eval on 2/11/25   ADL   Where Assessed Edge of bed   Eating Assistance 7  Independent   Grooming Assistance 5  Supervision/Setup   UB Bathing Assistance 5  Supervision/Setup   LB Bathing Assistance 4  Minimal Assistance   UB Dressing Assistance 5  Supervision/Setup   LB Dressing Assistance 4  Minimal Assistance   Toileting Assistance  5  Supervision/Setup   Bed Mobility   Supine to Sit 6  Modified independent   Additional items HOB elevated;Bedrails;Increased time required   Sit to Supine 5   Supervision   Additional items HOB elevated;Increased time required;Verbal cues  (Increased encouragement to complete task w/ greater independence)   Additional Comments Pt lying supine with bed alarm activated at end of session. Call bell and phone within reach. All needs met and pt reports no further questions for OT at this time.   Transfers   Sit to Stand 5  Supervision   Additional items Increased time required   Stand to Sit 5  Supervision   Additional items Increased time required   Functional Mobility   Functional Mobility 5  Supervision   Additional items Rolling walker  (Quad cane)   Balance   Static Sitting Good   Dynamic Sitting Fair +   Static Standing Fair   Dynamic Standing Fair -   Ambulatory Fair -   Activity Tolerance   Activity Tolerance Patient tolerated treatment well   Medical Staff Made Aware Sue, PT   Nurse Made Aware yes   RUE Assessment   RUE Assessment WFL   LUE Assessment   LUE Assessment WFL   Hand Function   Gross Motor Coordination Functional   Fine Motor Coordination Functional   Sensation   Light Touch No apparent deficits   Proprioception   Proprioception No apparent deficits   Vision - Complex Assessment   Acuity Able to read clock/calendar on wall without difficulty   Perception   Inattention/Neglect Appears intact   Cognition   Overall Cognitive Status WFL   Arousal/Participation Alert;Cooperative   Attention Within functional limits   Memory Within functional limits   Following Commands Follows one step commands without difficulty   Assessment   Limitation Decreased ADL status;Decreased UE strength;Decreased endurance;Decreased self-care trans;Decreased high-level ADLs   Prognosis Good   Assessment Pt is a 69 y.o. male seen for OT Re-evaluation 2* pt s/p fall on 2/16/25 at bedside will trying to urinate. Pt with active OT orders. Please refer to initial OT eval on 2/11/25 for info on home setup and PLOF. Upon Re-evaluation, pt currently requires Supervision for UB ADLs, Min  A for LB ADLs, Supervision for toileting, Supervision-Mod I for bed mobility, and Supervision for functional mobility/transfers 2* the following deficits impacting occupational performance: decreased strength , decreased balance, decreased activity tolerance, limited functional reach, and increased pain. These impairments, as well at pt’s personal factors of: steps within home environment, difficulty performing ADLs, difficulty performing transfers/mobility, fall risk , language barrier, and advanced age limit pt’s ability to safely engage in all baseline areas of occupation. Pt to continue to benefit from continued acute OT services during hospital stay to address defined deficits and to maximize level of functional independence in the following Occupational Performance areas: bathing/shower, toilet hygiene, dressing, health maintenance, functional mobility, community mobility, clothing management, and social participation. The patient's raw score on the -PAC Daily Activity Inpatient Short Form is 19. A raw score of greater than or equal to 19 suggests the patient may benefit from discharge to home. Please refer to the recommendation of the Occupational Therapist for safe discharge planning. OT will continue to follow pt 1-2x/wk to address the following goals to  w/in 10-14 days:   Goals   LTG Time Frame 10-14   Long Term Goal Please refer to LTGs listed below   Plan   Treatment Interventions ADL retraining;Functional transfer training;UE strengthening/ROM;Endurance training;Patient/family training;Equipment evaluation/education;Compensatory technique education;Continued evaluation;Activityengagement   Goal Expiration Date 25   OT Treatment Day 0   OT Frequency 1-2x/wk   Discharge Recommendation   Rehab Resource Intensity Level, OT III (Minimum Resource Intensity)   AM-PAC Daily Activity Inpatient   Lower Body Dressing 3   Bathing 3   Toileting 3   Upper Body Dressing 3   Grooming 3   Eating 4   Daily  Activity Raw Score 19   Daily Activity Standardized Score (Calc for Raw Score >=11) 40.22   AM-PAC Applied Cognition Inpatient   Following a Speech/Presentation 4   Understanding Ordinary Conversation 4   Taking Medications 3   Remembering Where Things Are Placed or Put Away 3   Remembering List of 4-5 Errands 3   Taking Care of Complicated Tasks 3   Applied Cognition Raw Score 20   Applied Cognition Standardized Score 41.76       GOALS    Pt will improve activity tolerance to G for min 30 min txment sessions for increase engagement in functional tasks    Pt will complete bed mobility at a Mod I level w/ G balance/safety demonstrated to decrease caregiver assistance required     Pt will complete UB dressing/self care w/ mod I using adaptive device and DME as needed     Pt will complete LB dressing/self care w/ mod I using adaptive device and DME as needed    Pt will complete toileting w/ mod I w/ G hygiene/thoroughness using DME as needed    Pt will improve functional transfers to Mod I on/off all surfaces using DME as needed w/ G balance/safety     Pt will improve functional mobility during ADL/IADL/leisure tasks to Mod I using DME as needed w/ G balance/safety     Pt will be attentive 100% of the time during ongoing cognitive assessment w/ G participation to assist w/ safe d/c planning/recommendations    Pt will demonstrate G carryover of pt/caregiver education and training as appropriate w/o cues w/ good tolerance to increase safety during functional tasks    Pt will increase BUE strength by 1MM grade via AROM/AAROM/PROM exercises to increase independence in ADLs and transfers    Pt will verbalize 3 potential fall hazards and identify appropriate compensatory techniques to decrease fall risk in home environment     Pt will increase standing tolerance to 5-8 mins with Fair+ dynamic standing balance to increase safety during participation in ADLs       Quyen Colón OTR/L

## 2025-02-18 NOTE — ASSESSMENT & PLAN NOTE
70 yo male with right sided perforated diverticulitis, now s/p lap hand assisted ileocecal resection on 2/14.  Patient still with pain near the incision sites as well as distention.  Patient has not had return of bowel function    2/15: transfused 1 unit for downtrending hemoglobin with tachycardia.  Responded appropriately.  2/16: fall onto knees while trying to urinate.  Denies any head strikes or injuries.    Plan  -F/u PT/OT eval  -F/u labs  -Continue diet as tolerated  -Follow-up intraoperative pathology  -Zosyn to fall off today  -PRN pain meds and anti nausea meds  -DVT prophylaxis  -Monitor hemoglobin  -Will transfuse for hemoglobin less than 7  -Serial abdominal exams  -Continue proper mgmt of intermittent HTN

## 2025-02-18 NOTE — PLAN OF CARE
Problem: OCCUPATIONAL THERAPY ADULT  Goal: Performs self-care activities at highest level of function for planned discharge setting.  See evaluation for individualized goals.  Description: Treatment Interventions: ADL retraining, Functional transfer training, UE strengthening/ROM, Endurance training, Patient/family training, Equipment evaluation/education, Compensatory technique education, Continued evaluation, Activityengagement          See flowsheet documentation for full assessment, interventions and recommendations.   Note: Limitation: Decreased ADL status, Decreased UE strength, Decreased endurance, Decreased self-care trans, Decreased high-level ADLs  Prognosis: Good  Assessment: Pt is a 69 y.o. male seen for OT Re-evaluation 2* pt s/p fall on 2/16/25 at bedside will trying to urinate. Pt with active OT orders. Please refer to initial OT eval on 2/11/25 for info on home setup and PLOF. Upon Re-evaluation, pt currently requires Supervision for UB ADLs, Min A for LB ADLs, Supervision for toileting, Supervision-Mod I for bed mobility, and Supervision for functional mobility/transfers 2* the following deficits impacting occupational performance: decreased strength , decreased balance, decreased activity tolerance, limited functional reach, and increased pain. These impairments, as well at pt’s personal factors of: steps within home environment, difficulty performing ADLs, difficulty performing transfers/mobility, fall risk , language barrier, and advanced age limit pt’s ability to safely engage in all baseline areas of occupation. Pt to continue to benefit from continued acute OT services during hospital stay to address defined deficits and to maximize level of functional independence in the following Occupational Performance areas: bathing/shower, toilet hygiene, dressing, health maintenance, functional mobility, community mobility, clothing management, and social participation. The patient's raw score on the  AM-PAC Daily Activity Inpatient Short Form is 19. A raw score of greater than or equal to 19 suggests the patient may benefit from discharge to home. Please refer to the recommendation of the Occupational Therapist for safe discharge planning. OT will continue to follow pt 1-2x/wk to address the following goals to  w/in 10-14 days:     Rehab Resource Intensity Level, OT: III (Minimum Resource Intensity)

## 2025-02-18 NOTE — PLAN OF CARE
Problem: PHYSICAL THERAPY ADULT  Goal: Performs mobility at highest level of function for planned discharge setting.  See evaluation for individualized goals.  Description: Treatment/Interventions: Functional transfer training, LE strengthening/ROM, Elevations, Therapeutic exercise, Patient/family training, Equipment eval/education, Bed mobility, Gait training, Continued evaluation, Spoke to nursing, OT          See flowsheet documentation for full assessment, interventions and recommendations.  Note: Prognosis: Good  Problem List: Decreased strength, Impaired balance, Pain  Assessment: Duncan was seen for a re-evaluation secondary to fall at bedside while trying to urinate (per chart). Noted consistent level of mobility compared to last session, including ability to ambulate community distances and negotiate steps. No LOB noted progressing from RW to QC. Did require slight increase in S compared to last session. Despite slight functional changes there continue to be no barriers to dc given his functioning at baseline and adequate social support. Will maintain on caseload to monitor function; if pt remains functioning at consistent level would consider dc back to restorative services while hospitalized.  Barriers to Discharge: None     Rehab Resource Intensity Level, PT: III (Minimum Resource Intensity) (OP PT for balance)    See flowsheet documentation for full assessment.

## 2025-02-19 VITALS
BODY MASS INDEX: 28.22 KG/M2 | RESPIRATION RATE: 18 BRPM | HEIGHT: 61 IN | TEMPERATURE: 99.4 F | WEIGHT: 149.47 LBS | HEART RATE: 84 BPM | DIASTOLIC BLOOD PRESSURE: 62 MMHG | OXYGEN SATURATION: 97 % | SYSTOLIC BLOOD PRESSURE: 159 MMHG

## 2025-02-19 LAB
BASOPHILS # BLD AUTO: 0.07 THOUSANDS/ΜL (ref 0–0.1)
BASOPHILS NFR BLD AUTO: 1 % (ref 0–1)
EOSINOPHIL # BLD AUTO: 0.14 THOUSAND/ΜL (ref 0–0.61)
EOSINOPHIL NFR BLD AUTO: 1 % (ref 0–6)
ERYTHROCYTE [DISTWIDTH] IN BLOOD BY AUTOMATED COUNT: 14.6 % (ref 11.6–15.1)
GLUCOSE SERPL-MCNC: 160 MG/DL (ref 65–140)
GLUCOSE SERPL-MCNC: 221 MG/DL (ref 65–140)
GLUCOSE SERPL-MCNC: 269 MG/DL (ref 65–140)
HCT VFR BLD AUTO: 27.1 % (ref 36.5–49.3)
HGB BLD-MCNC: 8.9 G/DL (ref 12–17)
IMM GRANULOCYTES # BLD AUTO: 0.06 THOUSAND/UL (ref 0–0.2)
IMM GRANULOCYTES NFR BLD AUTO: 1 % (ref 0–2)
LYMPHOCYTES # BLD AUTO: 2.68 THOUSANDS/ΜL (ref 0.6–4.47)
LYMPHOCYTES NFR BLD AUTO: 24 % (ref 14–44)
MCH RBC QN AUTO: 29.3 PG (ref 26.8–34.3)
MCHC RBC AUTO-ENTMCNC: 32.8 G/DL (ref 31.4–37.4)
MCV RBC AUTO: 89 FL (ref 82–98)
MONOCYTES # BLD AUTO: 0.78 THOUSAND/ΜL (ref 0.17–1.22)
MONOCYTES NFR BLD AUTO: 7 % (ref 4–12)
NEUTROPHILS # BLD AUTO: 7.47 THOUSANDS/ΜL (ref 1.85–7.62)
NEUTS SEG NFR BLD AUTO: 66 % (ref 43–75)
NRBC BLD AUTO-RTO: 0 /100 WBCS
PLATELET # BLD AUTO: 594 THOUSANDS/UL (ref 149–390)
PMV BLD AUTO: 9.2 FL (ref 8.9–12.7)
RBC # BLD AUTO: 3.04 MILLION/UL (ref 3.88–5.62)
WBC # BLD AUTO: 11.2 THOUSAND/UL (ref 4.31–10.16)

## 2025-02-19 PROCEDURE — 99024 POSTOP FOLLOW-UP VISIT: CPT

## 2025-02-19 PROCEDURE — 99024 POSTOP FOLLOW-UP VISIT: CPT | Performed by: PHYSICIAN ASSISTANT

## 2025-02-19 PROCEDURE — 82948 REAGENT STRIP/BLOOD GLUCOSE: CPT

## 2025-02-19 PROCEDURE — 85025 COMPLETE CBC W/AUTO DIFF WBC: CPT | Performed by: STUDENT IN AN ORGANIZED HEALTH CARE EDUCATION/TRAINING PROGRAM

## 2025-02-19 RX ORDER — OXYCODONE HYDROCHLORIDE 5 MG/1
5 TABLET ORAL EVERY 4 HOURS PRN
Qty: 10 TABLET | Refills: 0 | Status: SHIPPED | OUTPATIENT
Start: 2025-02-19

## 2025-02-19 RX ORDER — OXYCODONE HYDROCHLORIDE 5 MG/1
5 TABLET ORAL EVERY 4 HOURS PRN
Refills: 0 | Status: DISCONTINUED | OUTPATIENT
Start: 2025-02-19 | End: 2025-02-19 | Stop reason: HOSPADM

## 2025-02-19 RX ADMIN — INSULIN LISPRO 1 UNITS: 100 INJECTION, SOLUTION INTRAVENOUS; SUBCUTANEOUS at 08:15

## 2025-02-19 RX ADMIN — METHOCARBAMOL 500 MG: 500 TABLET ORAL at 12:04

## 2025-02-19 RX ADMIN — PIPERACILLIN AND TAZOBACTAM 4.5 G: 36; 4.5 INJECTION, POWDER, LYOPHILIZED, FOR SOLUTION INTRAVENOUS at 05:38

## 2025-02-19 RX ADMIN — GABAPENTIN 300 MG: 300 CAPSULE ORAL at 08:15

## 2025-02-19 RX ADMIN — LOSARTAN POTASSIUM 50 MG: 50 TABLET, FILM COATED ORAL at 08:15

## 2025-02-19 RX ADMIN — TAMSULOSIN HYDROCHLORIDE 0.4 MG: 0.4 CAPSULE ORAL at 08:15

## 2025-02-19 RX ADMIN — PIPERACILLIN AND TAZOBACTAM 4.5 G: 36; 4.5 INJECTION, POWDER, LYOPHILIZED, FOR SOLUTION INTRAVENOUS at 15:12

## 2025-02-19 RX ADMIN — METHOCARBAMOL 500 MG: 500 TABLET ORAL at 05:38

## 2025-02-19 RX ADMIN — METHOCARBAMOL 500 MG: 500 TABLET ORAL at 17:07

## 2025-02-19 RX ADMIN — ENOXAPARIN SODIUM 40 MG: 40 INJECTION SUBCUTANEOUS at 08:15

## 2025-02-19 RX ADMIN — INSULIN LISPRO 2 UNITS: 100 INJECTION, SOLUTION INTRAVENOUS; SUBCUTANEOUS at 12:04

## 2025-02-19 RX ADMIN — INSULIN LISPRO 2 UNITS: 100 INJECTION, SOLUTION INTRAVENOUS; SUBCUTANEOUS at 17:55

## 2025-02-19 RX ADMIN — ACETAMINOPHEN 975 MG: 325 TABLET, FILM COATED ORAL at 17:07

## 2025-02-19 RX ADMIN — PRAVASTATIN SODIUM 80 MG: 80 TABLET ORAL at 16:12

## 2025-02-19 RX ADMIN — ACETAMINOPHEN 975 MG: 325 TABLET, FILM COATED ORAL at 12:04

## 2025-02-19 RX ADMIN — GABAPENTIN 300 MG: 300 CAPSULE ORAL at 16:12

## 2025-02-19 NOTE — CASE MANAGEMENT
Case Management Discharge Planning Note    Patient name Duncan Sánchez  Location [facility] 2 /[facility] 2 M* MRN [id number]  : 1955 Date 2025       Current Admission Date: 2025  Current Admission Diagnosis:Diverticulitis   Patient Active Problem List    Diagnosis Date Noted Date Diagnosed    Diverticulitis 2025     Acute left-sided low back pain without sciatica 2024     Balance problem 2024     Chronic pain of both shoulders 2024     Neuropathy 2024     Benign prostatic hyperplasia 08/10/2024     Primary hypertension 2024     Hyperlipidemia 2024     Type 2 diabetes mellitus without complication, without long-term current use of insulin (HCC) 2024     History of CVA (cerebrovascular accident) 2024       LOS (days): 10  Geometric Mean LOS (GMLOS) (days): 2.8  Days to GMLOS:-7.2     OBJECTIVE:  Risk of Unplanned Readmission Score: 11.32         Current admission status: Inpatient   Preferred Pharmacy:   [id number] [place] - [address]  [address]  [place] [address]  Phone: [phone] Fax: [phone]    Primary Care Provider: Mena Alexis PA-C    Primary Insurance: MEDICARE  Secondary Insurance: [facility]    DISCHARGE DETAILS:    Discharge planning discussed with:: Patient and HHA caregiver at bedside with staff assisting with interpretation services  Freedom of Choice: Yes  Comments - Freedom of Choice: Patient agreeable to HHC with [facility] (PT/OT).  CM contacted family/caregiver?: No- see comments (Patient declined need)  Were Treatment Team discharge recommendations reviewed with patient/caregiver?: Yes  Did patient/caregiver verbalize understanding of patient care needs?: Yes       Contacts  Patient Contacts: Nichelle Srinivasan (caregiver) [phone]  Relationship to Patient:: Other (Comment) (HHA/ Caregiver)  Contact Method: In Person  Reason/Outcome: Emergency  Contact    Requested Home Health Care         Is the patient interested in HHC at discharge?: Yes  Home Health Discipline requested:: Physical Therapy, Occupational Therapy  Home Health Agency Name:: [facility]  Home Health Follow-Up Provider:: MANUEL  Home Health Services Needed:: Gait/ADL Training, Strengthening/Theraputic Exercises to Improve Function  Homebound Criteria Met:: Requires the Assistance of Another Person for Safe Ambulation or to Leave the Home  Supporting Clincal Findings:: Limited Endurance, Fatigues Easliy in Short Distances      Other Referral/Resources/Interventions Provided:  Interventions: HHC  Referral Comments: [facility]HC (PT/OT) with  [facility]    Treatment Team Recommendation: Home with Home Health Care  Discharge Destination Plan:: Home with Home Health Care  Transport at Discharge : Wheelchair van     Number/Name of Dispatcher: ERNESTO [phone]     ETA of Transport (Date): 02/19/25  ETA of Transport (Time): 1700     IMM Given (Date):: 02/19/25 (With assistance of PCA for Bangladeshi interpretation)  IMM Given to:: Patient (With PCA assisting in Bangladeshi at bedside)     Additional Comments: CM met with patient and patient's caregiver/ HHA at bedside and PCA to assist with Bangladeshi interpretation to follow up on discharge planning. Patient agreeable to  [facility] (PT/OT) CM reserved in [facility] and updated discharge plan. CM provided patient with IMM, patient agreeable and understood right to appeal. CM placed completed IMM and placed in scan bin. CM confirmed patient HHA 8-9 hrs daily M-F. CM confirmed patient okay with [facility] transportation request for 5:00P.M, patient agreeable. CM requested Roundtrip, [facility]. CM to follow for further discharge planning needs.

## 2025-02-19 NOTE — DISCHARGE INSTR - AVS FIRST PAGE
[facility]  Post-Operative Care Instructions  Nicholas Gaffney MD, [facility]  [phone]    1. General: You will feel pulling sensations around the wound or funny aches and pains around the incisions. This is normal. Even minor surgery is a change in your body and this is your body’s way of reaction to it. If you have had abdominal surgery, it may help to support the incision with a small pillow or blanket for comfort when moving or coughing.    2. Wound care:  Okay to shower.  The glue will fall off over the next week or 2.    3. Water: You may shower over the wound, unless there are drain tubes left in place. Do not bathe or use a pool or hot tub until cleared by the physician.    4. Activity: You may go up and down stairs, walk as much as you are comfortable, but walk at least 3 times each day. If you have had abdominal surgery, do not lift anything heavier than 15 pounds for at least 2-4 weeks, unless cleared by the doctor.    5. Diet: You may resume a regular diet. If you had a same-day surgery or overnight stay surgery, he may wish to eat lightly for a few days: soups, crackers, and sandwiches. You may resume a regular diet when ready.    6. Medications: Resume all of your previous medications, unless told otherwise by the doctor. Avoid aspirin or ibuprofen (Advil, Motrin, etc.) products for 2-3 days after the date of surgery. You may, at that time, began to take them again. Tylenol is always fine, unless you are taking any narcotic pain medication containing Tylenol (such as Percocet, Darvocet, Vicodin, or anything containing acetaminophen). Do not take Tylenol if you're taking these medications. You do not need to take the narcotic pain medications unless you are having significant pain and discomfort.    7. Driving: You will need someone to drive you home on the day of surgery. Do not drive or make any important decisions while on narcotic pain medication or 24 hours and after anesthesia or sedation  for surgery. Generally, you may drive when your off all narcotic pain medications.    8. Upset Stomach: You may take Maalox, Tums, or similar items for an upset stomach. If your narcotic pain medication causes an upset stomach, do not take it on an empty stomach. Try taking it with at least some crackers or toast.     9. Constipation: Patients often experienced constipation after surgery. You may take over-the-counter medication for this, such as Metamucil, Senokot, Dulcolax, milk of magnesia, etc. You may take a suppository unless you have had anorectal surgery such as a procedure on your hemorrhoids. If you experience significant nausea or vomiting after abdominal surgery, call the office before trying any of these medications.    10. Call the office: If you are experiencing any of the following, fevers above 101.5°, significant nausea or vomiting, if the wound develops drainage and/or is excessive redness around the wound, or if you have significant diarrhea or other worsening symptoms.    11. Pain: You may be given a prescription for pain. This will be given to the hospital, the day of surgery.    12. Sexual Activity: You may resume sexual activity when you feel ready and comfortable and your incision is sealed and healed without apparent infection risk.    13. Urination: If you haven't urinated in 6 hours, go directly to the ER for evaluation for urinary retention.     14. Follow-up in 2 weeks.

## 2025-02-19 NOTE — ASSESSMENT & PLAN NOTE
70 yo male with right sided perforated diverticulitis, now s/p lap hand assisted ileocecal resection on 2/14.  Patient still with pain near the incision sites as well as distention that is improved.  Patient had a bowel movement and passed gas yesterday, but nothing overnight.    2/15: transfused 1 unit for downtrending hemoglobin with tachycardia.  Responded appropriately.  2/16: fall onto knees while trying to urinate.  Denies any head strikes or injuries.    Plan  -Goal discharge within the next 24 hours  -Continue diet as tolerated  -Follow-up intraoperative pathology  -PRN pain meds and anti nausea meds  -DVT prophylaxis  -F/u AM labs  -Serial abdominal exams  -Continue proper mgmt of intermittent HTN

## 2025-02-19 NOTE — CASE MANAGEMENT
Case Management Discharge Planning Note    Patient name Duncan Sánchez  Location [facility] 2 /South 2 M* MRN [id number]  : 1955 Date 2025       Current Admission Date: 2025  Current Admission Diagnosis:Diverticulitis   Patient Active Problem List    Diagnosis Date Noted Date Diagnosed    Diverticulitis 2025     Acute left-sided low back pain without sciatica 2024     Balance problem 2024     Chronic pain of both shoulders 2024     Neuropathy 2024     Benign prostatic hyperplasia 08/10/2024     Primary hypertension 2024     Hyperlipidemia 2024     Type 2 diabetes mellitus without complication, without long-term current use of insulin (HCC) 2024     History of CVA (cerebrovascular accident) 2024       LOS (days): 10  Geometric Mean LOS (GMLOS) (days): 2.8  Days to GMLOS:-7.1     OBJECTIVE:  Risk of Unplanned Readmission Score: 11.04         Current admission status: Inpatient   Preferred Pharmacy:   [id number] [place] - [address]  [address]  [place] [address]  Phone: [phone] Fax: [phone]    Primary Care Provider: Mena Alexis PA-C    Primary Insurance: MEDICARE  Secondary Insurance: [facility]    DISCHARGE DETAILS:    Additional Comments: CM left message for patient (PH: [phone])  with assistance of interperter (Hayley, [phone]) to provide patient with IMM. CM left message with CM contact information. CM spoke with [facility] representative, Valentin Traylor (PH: [phone]) CM informed of patient discharge to home. CM to follow for further discharge planning.

## 2025-02-19 NOTE — TELEPHONE ENCOUNTER
Upon review of the In Basket request we were able to locate, review, and update the patient chart as requested for Diabetic Foot Exam.    Any additional questions or concerns should be emailed to the Practice Liaisons via the appropriate education email address, please do not reply via In Basket.    Thank you  Talia Marrero MA   [facility]

## 2025-02-19 NOTE — PROGRESS NOTES
"Progress Note - Surgery-General   Name: Duncan Sánchez 69 y.o. male I MRN: [id number]  Unit/Bed#: [place] [address] -01 I Date of Admission: 2/9/2025   Date of Service: 2/18/2025 I Hospital Day: 9     Assessment & Plan  Diverticulitis  70 yo male with right sided perforated diverticulitis, now s/p lap hand assisted ileocecal resection on 2/14.  Patient still with pain near the incision sites as well as distention that is improved.  Patient had a bowel movement and passed gas yesterday, but nothing overnight.    2/15: transfused 1 unit for downtrending hemoglobin with tachycardia.  Responded appropriately.  2/16: fall onto knees while trying to urinate.  Denies any head strikes or injuries.    Plan  -Goal discharge within the next 24 hours  -Continue diet as tolerated  -Follow-up intraoperative pathology  -PRN pain meds and anti nausea meds  -DVT prophylaxis  -F/u AM labs  -Serial abdominal exams  -Continue proper mgmt of intermittent HTN    24 Hour Events : No acute overnight events.  Subjective : Patient feels much better this morning with minimal amount of pain near the incision sites.  He denies any nausea, vomiting, fevers or chills overnight.  Patient is up and walking around.  He states having a bowel movement yesterday, but no bowel function overnight.    Objective :  Visit Vitals  /87   Pulse 98   Temp 98.8 °F (37.1 °C) (Oral)   Resp 20   Ht 5' 1\" (1.549 m)   Wt 67.8 kg (149 lb 7.6 oz)   SpO2 96%   BMI 28.24 kg/m²   Smoking Status Never   BSA 1.67 m²        Physical Exam  Constitutional:       General: He is not in acute distress.     Appearance: Normal appearance. He is not ill-appearing, toxic-appearing or diaphoretic.   Cardiovascular:      Rate and Rhythm: Normal rate and regular rhythm.      Pulses: Normal pulses.      Heart sounds: Normal heart sounds. No murmur heard.  Pulmonary:      Effort: Pulmonary effort is normal. No respiratory distress.      Breath sounds: Normal breath sounds. No " wheezing.   Abdominal:      General: Abdomen is flat. There is distension (Appropriately distended).      Palpations: Abdomen is soft. There is no mass.      Tenderness: There is abdominal tenderness (Appropriately tender near the incision sites.  Some ecchymosis.  Clean dry and intact.). There is no right CVA tenderness, left CVA tenderness, guarding or rebound.      Hernia: No hernia is present.   Neurological:      General: No focal deficit present.      Mental Status: He is alert and oriented to person, place, and time.         Lab Results: I have reviewed the following results:  Recent Labs     02/17/25  0600 02/19/25  0537   WBC 12.30* 11.20*   HGB 9.1* 8.9*   * 594*          VTE Pharmacologic Prophylaxis: VTE covered by:  enoxaparin, Subcutaneous, 40 mg at 02/18/25 1049     VTE Mechanical Prophylaxis: sequential compression device

## 2025-02-20 ENCOUNTER — HOME HEALTH ADMISSION (OUTPATIENT)
Dept: HOME HEALTH SERVICES | Facility: HOME HEALTHCARE | Age: 70
End: 2025-02-20
Payer: COMMERCIAL

## 2025-02-20 ENCOUNTER — TELEPHONE (OUTPATIENT)
Dept: GASTROENTEROLOGY | Facility: MEDICAL CENTER | Age: 70
End: 2025-02-20

## 2025-02-20 PROCEDURE — 88307 TISSUE EXAM BY PATHOLOGIST: CPT | Performed by: STUDENT IN AN ORGANIZED HEALTH CARE EDUCATION/TRAINING PROGRAM

## 2025-02-20 NOTE — UTILIZATION REVIEW
NOTIFICATION OF ADMISSION DISCHARGE   This is a Notification of Discharge from [facility]. Please be advised that this patient has been discharge from our facility. Below you will find the admission and discharge date and time including the patient’s disposition.   UTILIZATION REVIEW CONTACT:  Kate Curiel MA  Utilization   Network Utilization Review Department  Phone: [phone] x carefully listen to the prompts. All voicemails are confidential.  Email: [email or web address]     ADMISSION INFORMATION  PRESENTATION DATE: 2/9/2025 12:14 PM  OBERVATION ADMISSION DATE: N/A  INPATIENT ADMISSION DATE: 2/9/25  4:32 PM   DISCHARGE DATE: 2/19/2025 10:25 AM   DISPOSITION:Home with Home Health Care    Network Utilization Review Department  ATTENTION: Please call with any questions or concerns to [phone] and carefully listen to the prompts so that you are directed to the right person. All voicemails are confidential.   For Discharge needs, contact Care Management DC Support Team at [phone] opt. 2  Send all requests for admission clinical reviews, approved or denied determinations and any other requests to dedicated fax number below belonging to the campus where the patient is receiving treatment. List of dedicated fax numbers for the Facilities:  FACILITY NAME UR FAX NUMBER   ADMISSION DENIALS (Administrative/Medical Necessity) [phone]   DISCHARGE SUPPORT TEAM ([facility]) [phone]   PARENT CHILD HEALTH (Maternity/NICU/Pediatrics) [phone]   [facility] [phone]   [facility] [phone]   [facility] [phone]   [facility] [phone]   [facility] [phone]   [facility] [phone]   [facility] [phone]   [facility]  [place] [phone]   [facility] [phone]   [facility] [phone]   [facility] [phone]   [facility] [phone]

## 2025-02-20 NOTE — UTILIZATION REVIEW
Initial Clinical Review    Admission: Date/Time/Statement:   Admission Orders (From admission, onward)       Ordered        02/09/25 1632  INPATIENT ADMISSION  Once                          Orders Placed This Encounter   Procedures    INPATIENT ADMISSION     Standing Status:   Standing     Number of Occurrences:   1     Level of Care:   Level 2 Stepdown / HOT [14]     Estimated length of stay:   More than 2 Midnights     Certification:   I certify that inpatient services are medically necessary for this patient for a duration of greater than two midnights. See H&P and MD Progress Notes for additional information about the patient's course of treatment.     ED Arrival Information       Expected   -    Arrival   2/9/2025 12:14    Acuity   Urgent              Means of arrival   Ambulance    Escorted by   [facility] ([facility])    Service   Hospitalist    Admission type   Emergency              Arrival complaint   -             Chief Complaint   Patient presents with    Flu Symptoms     Patient arrives via EMS with c/o cough, fever, generalized body aches, headache, abdominal pain, N/V for past 2 days.        Initial Presentation: 69 y.o. male with a PMH of  non-insulin-dependent T2DM and HTN who presents  to the ED from home with complaint of  worsening abdominal pain, nausea without vomiting, subjective fevers, and foul-smelling dark stool which started last night.  Patient reports longstanding history of abdominal pain associated with eating which he controls with antiacids and Tylenol.  Worsening pain that developed overnight has been more severe.  He reports his last bowel movement was this morning. CT scan concerning for perforated R sided diverticulitis a/w TI and appendix inflammation.  WBC 10.4, 31 bands, procalcitonin 11.24 and mag 1.4.  In the ED, patient was treated with IV Zosyn, IVF, IV magnesium, IV Zofran and acetaminophen. Exam:  AOx3. Abdomen soft, non-distended,  diffuse severe tenderness,  more severe in RLQ.  Voluntary guarding.     2/9 Inpatient admission to Stepdown Level 2 for evaluation and treatment of diverticulitis:  NPO, IVF, continue Zosyn, follow repeat lactic acid.  Low threshold for operative intervention.      2/10 General Surgery:  Pain controlled with as needed medication but continues to endorse severe and diffuse abdominal pain, more in RLQ. Passing flatus but no bowel movement. Has not voided spontaneously, requiring straight cath x 1 overnight, 700 ml UOP.  Refusing straight cath this morning.  WBC 7.9 from 10.5,  Hemoglobin 8.7 from 10.2.  Lactic acid 0.5.  Continue NPO, IVF, IV Zosyn, consider Castillo. Trend WBC, fever curve.     2/11 Day 3: Has surpassed a 2nd midnight with active treatments and services. General Surgery:  Intermittent, asymptomatic hypertension overnight.  Patient still complains of moderate amount of abdominal pain located in the central area. He denies passing gas or having bowel movements overnight. Patient complains of bloating.  Continue IV Zosyn. NPO, IVF. Plan for repeat CT 2/13 to assess for interval development of abscess.       ED Treatment-Medication Administration from 02/09/2025 1214 to 02/09/2025 1831         Date/Time Order Dose Route Action     02/09/2025 1316 sodium chloride 0.9 % bolus 1,000 mL 1,000 mL Intravenous New Bag     02/09/2025 1317 ondansetron (ZOFRAN) injection 4 mg 4 mg Intravenous Given     02/09/2025 1412 iohexol (OMNIPAQUE) 350 MG/ML injection (MULTI-DOSE) 100 mL 100 mL Intravenous Given     02/09/2025 1519 piperacillin-tazobactam (ZOSYN) IVPB 4.5 g 4.5 g Intravenous New Bag     02/09/2025 1535 multi-electrolyte (ISOLYTE-S PH 7.4) bolus 1,000 mL 1,000 mL Intravenous New Bag     02/09/2025 1627 magnesium sulfate 2 g/50 mL IVPB (premix) 2 g 2 g Intravenous New Bag     02/09/2025 1705 multi-electrolyte (PLASMALYTE-A/ISOLYTE-S PH 7.4) IV solution 125 mL/hr Intravenous New Bag     02/09/2025 1713 acetaminophen (Ofirmev) injection  1,000 mg 1,000 mg Intravenous New Bag            Scheduled Medications:    Medications 02/09 02/10 02/11   acetaminophen (Ofirmev) injection 1,000 mg  Dose: 1,000 mg  Freq: Every 6 hours scheduled Route: IV  Last Dose: 1,000 mg (02/11/25 1114)  Start: 02/09/25 1800 End: 02/11/25 1129   Admin Instructions:   Protect from light.               0015     0100     0507     1233     1909      0114     0146     0613     0633     1114 [C]         calcium gluconate 2 g in sodium chloride 0.9% 100 mL (premix)  Dose: 2 g  Freq: Once Route: IV  Last Dose: Stopped (02/10/25 0940)  Start: 02/10/25 0730 End: 02/10/25 0940   Admin Instructions:   Vesicant. Central venous access preferred. Incompatible with phosphate containing solutions.     0834     0940         Followed by   calcium gluconate 1 g in sodium chloride 0.9% 50 mL (premix)  Dose: 1 g  Freq: Once Route: IV  Last Dose: 1 g (02/10/25 0940)  Start: 02/10/25 0730 End: 02/10/25 1010   Admin Instructions:   Vesicant. Central venous access preferred. Incompatible with phosphate containing solutions.     0940         calcium gluconate 2 g in sodium chloride 0.9% 100 mL (premix)  Dose: 2 g  Freq: Once Route: IV  Last Dose: 2 g (02/11/25 0916)  Start: 02/11/25 0845 End: 02/11/25 1016   Admin Instructions:   Vesicant. Central venous access preferred. Incompatible with phosphate containing solutions.      0916        enoxaparin (LOVENOX) subcutaneous injection 40 mg  Dose: 40 mg  Freq: Daily Route: SC  Start: 02/10/25 0900 End: 02/19/25 2139   Admin Instructions:   High Alert Medication, Check for allergies to pork or pork derivatives/dietary restrictions before administration. LOOK ALIKE S[employer] ALIKE MED     0834      [id number] 0026         insulin lispro (HumALOG/ADMELOG) 100 units/mL subcutaneous injection 1-5 Units  Dose: 1-5 Units  Freq: Every 6 hours scheduled Route: SC  Start: 02/09/25 1800 End: 02/18/25 0845   Admin Instructions:   Algorithm 2    Blood Glucose 150 -  209: 1 Unit of Insulin  Blood Glucose 210 - 269: 2 Units of Insulin  Blood Glucose 270 - 329: 3 Units of Insulin  Blood Glucose 330 - 389: 4 Units of Insulin  Blood Glucose greater than or equal to 390: 5 Units of Insulin  HIGH ALERT MEDICATION. **DISPOSE IN 8 GALLON BLACK CONTAINER**. LOOK ALIKE S[employer] ALIKE MED    (1710)      (0049)     (0521)     1233     (1908)      (0111)     (0630) [C]     (1100) [C]     (1736) [C]               (8256)        magnesium sulfate 2 g/50 mL IVPB (premix) 2 g  Dose: 2 g  Freq: Once Route: IV  Last Dose: 2 g (02/11/25 0916)  Start: 02/11/25 0845 End: 02/11/25 1116   Admin Instructions:   High alert medication.      0916         piperacillin-tazobactam (ZOSYN) IVPB 4.5 g  Dose: 4.5 g  Freq: Once Route: IV  Start: 02/09/25 1645 End: 02/19/25 0954   Admin Instructions:   If significant IV compatibility issues arise with Zosyn IV, please contact pharmacy for assistance.   Order specific questions:   Indication: intra-abdominal infection    (                  Followed by   piperacillin-tazobactam (ZOSYN) IVPB (EXTENDED INFUSION) 4.5 g  Dose: 4.5 g  Freq: Every 8 hours Route: IV  Last Dose: 4.5 g (02/19/25 0538)  Start: 02/09/25 2031 End: 02/19/25 0954   Admin Instructions:   EXTENDED-INFUSION dosing administered over 4 hours (240 Minutes). If significant IV compatibility issues arise with Zosyn IV, please contact pharmacy for assistance.   Order specific questions:   Indication: intra-abdominal infection    2017           0507     1233     2026           0613     1152               2125        tamsulosin (FLOMAX) capsule 0.4 mg  Dose: 0.4 mg  Freq: Daily Route: PO  Start: 02/10/25 0900 End: 02/19/25 2139   Admin Instructions:   Swallow whole; do not crush, chew, or open     0834      0916            Continuous infusion:    multi-electrolyte (PLASMALYTE-A/ISOLYTE-S PH 7.4) IV solution  Rate: 75 mL/hr Dose: 75 mL/hr  Freq: Continuous Route: IV  Last Dose: Stopped (02/16/25 1103)  Start:  02/09/25 1630       PRN Meds Sorted by Name        Medications 02/09 02/10 02/11   hydrALAZINE (APRESOLINE) injection 10 mg  Dose: 10 mg  Freq: Every 4 hours PRN Route: IV  PRN Reason: high blood pressure  PRN Comment: SBP > 160, second line, do not administer within 30 minutes of another antihypertensive medication  Start: 02/11/25 1827 End: 02/19/25 2139   Admin Instructions:   Administer as slow IV push, 5 mg/min.    SBP > 160, second line, do not administer within 30 minutes of another antihypertensive medication  L[employer] ALIKE [employer]   Order specific questions:   Hold for systolic blood pressure less than (mmHg) 110      2116        hydrALAZINE (APRESOLINE) injection 5 mg  Dose: 5 mg  Freq: Every 4 hours PRN Route: IV  PRN Reason: high blood pressure  PRN Comment: SBP > 160, second line, do not administer within 30 minutes of another antihypertensive medication  Start: 02/09/25 1634 End: 02/11/25 1827   Admin Instructions:   Administer as slow IV push, 5 mg/min.    SBP > 160, second line, do not administer within 30 minutes of another antihypertensive medication  L[employer]   Order specific questions:   Hold for systolic blood pressure less than (mmHg) 110      1827-D/C'd      HYDROmorphone (DILAUDID) injection 0.5 mg  Dose: 0.5 mg  Freq: Every 4 hours PRN Route: IV  PRN Reason: severe pain  Start: 02/09/25 1630 End: 02/19/25 0731   Admin Instructions:   High alert medication. LOOK [employer] ALIKE [employer]    1852      0008     1610                   HYDROmorphone HCl (DILAUDID) injection 0.2 mg  Dose: 0.2 mg  Freq: Every 3 hours PRN Route: IV  PRN Reason: breakthrough pain  Start: 02/09/25 1630 End: 02/19/25 0731   Admin Instructions:   High alert medication.  LOOK ALIKE [employer] ALIKE [employer]                   labetalol (NORMODYNE) injection 10 mg  Dose: 10 mg  Freq: Every 4 hours PRN Route: IV  PRN Reason: high blood pressure  PRN Comment: SBP > 160, first line, do not administer within 30  minutes of another antihypertensive medication  Start: 02/09/25 1633 End: 02/11/25 2212   Admin Instructions:   Administer slowly at rate of 10 mg per minute. Hold for heart rate less than 50 beats per minute.    SBP > 160, first line, do not administer within 30 minutes of another antihypertensive medication  LOOK ALIClout     0536     1039      0121     0614 [C]     1836 [C]     2212-D/C'd      labetalol (NORMODYNE) injection 20 mg  Dose: 20 mg  Freq: Every 4 hours PRN Route: IV  PRN Reason: high blood pressure  PRN Comment: SBP > 160, first line, do not administer within 30 minutes of another antihypertensive medication  Start: 02/11/25 2210 End: 02/19/25 2139   Admin Instructions:   Administer slowly at rate of 10 mg per minute. Hold for heart rate less than 50 beats per minute.    SBP > 160, first line, do not administer within 30 minutes of another antihypertensive medication  LOOK [employer]      5051        ondansetron (ZOFRAN) injection 4 mg  Dose: 4 mg  Freq: Every 6 hours PRN Route: IV  PRN Reason: nausea  Start: 02/09/25 1630 End: 02/19/25 2139   Admin Instructions:   Push over 2 minutes.                       ED Triage Vitals   Temperature Pulse Respirations Blood Pressure SpO2 Pain Score   02/09/25 1225 02/09/25 1217 02/09/25 1217 02/09/25 1217 02/09/25 1217 02/09/25 1800   99.8 °F (37.7 °C) (!) 115 18 122/60 99 % No Pain     Weight (last 2 days) before discharge      02/09/25 1225 67 kg (147 lb 11.3 oz)       Date and Time Temp Pulse SpO2 Resp BP Pain Score   02/11/25 2236 -- 91 99 % -- 162/75 --   02/11/25 2206 -- 96 97 % -- 189/88 (Abnormal)   --   02/11/25 2200 -- -- -- -- -- No Pain   02/11/25 2109 -- 80 97 % -- 184/83 (Abnormal)   --   02/11/25 2105 99.6 °F (37.6 °C) 86 97 % -- 190/81 (Abnormal)   --   02/11/25 1757 99.7 °F (37.6 °C) 78 98 % 16 179/75 (Abnormal)   --   02/11/25 1700 -- 80 99 % 22 168/77 --   02/11/25 1600 -- 94 100 % 29 (Abnormal)   171/75 (Abnormal)   --    02/11/25 1535 98.9 °F (37.2 °C) -- -- -- -- --   02/11/25 1100 98.5 °F (36.9 °C) -- -- -- -- --   02/11/25 1100 -- 80 99 % -- 167/74 --   02/11/25 1055 -- -- -- -- -- No Pain   02/11/25 1041 -- -- -- -- -- No Pain   02/11/25 1015 -- 77 97 % 22 160/70 --   02/11/25 1000 -- 76 97 % 21 183/70 (Abnormal)   --   02/11/25 0930 -- 73 99 % 18 154/63 --   02/11/25 0900 -- 77 98 % 16 173/78 (Abnormal)   --   02/11/25 0830 -- 73 98 % 14 149/73 --   02/11/25 0730 -- 76 96 % 18 154/69 --   02/11/25 0720 99.1 °F (37.3 °C) 79 97 % 21 -- No Pain   02/11/25 0700 -- 76 97 % 17 148/67 --   02/11/25 0615 -- 82 98 % 17 168/76 --   02/11/25 0600 -- 80 98 % 18 193/77 (Abnormal)   --   02/11/25 0500 -- 77 98 % 16 170/78 --   02/11/25 0400 -- 77 97 % 18 165/73 --   02/11/25 0300 -- 82 96 % 15 141/64 No Pain   02/11/25 0230 99.2 °F (37.3 °C) 91 96 % 20 133/63 --   02/11/25 0200 -- 86 96 % 22 156/69 --   02/11/25 0130 -- 86 95 % 20 181/79 (Abnormal)   --   02/11/25 0119 -- 101 97 % 24 (Abnormal)   196/93 (Abnormal)   --   02/11/25 0100 -- 88 -- 19 197/86 (Abnormal)   --   02/11/25 0000 100.2 °F (37.9 °C) 81 98 % 18 165/73 No Pain   02/10/25 2300 -- 79 97 % 16 167/72 --   02/10/25 2200 -- 72 98 % 15 162/69 --   02/10/25 2100 -- 75 96 % 20 131/60 --   02/10/25 1950 -- -- -- -- -- 5   02/10/25 1938 98.6 °F (37 °C) -- -- -- -- --   02/10/25 1900 -- 84 97 % 26 (Abnormal)   171/74 (Abnormal)   --   02/10/25 1800 -- 83 99 % 23 (Abnormal)   154/65 --   02/10/25 1700 -- 79 94 % 14 132/60 --   02/10/25 1610 -- -- -- -- -- 10 - Worst Possible Pain   02/10/25 1600 98.4 °F (36.9 °C) 83 99 % 22 127/58 --   02/10/25 1500 -- 76 97 % 15 136/65 --   02/10/25 1400 -- 83 97 % 18 126/59 --   02/10/25 1300 -- 89 97 % 18 138/63 --   02/10/25 1200 99.1 °F (37.3 °C) 96 97 % 21 160/68 --   02/10/25 1130 -- 94 96 % 19 159/70 --   02/10/25 1033 -- 118 (Abnormal)   99 % 29 (Abnormal)   229/95 (Abnormal)   --   02/10/25 1000 -- 97 98 % 19 199/88 (Abnormal)   --    02/10/25 0900 -- 88 99 % 21 192/84 (Abnormal)   --   02/10/25 0830 -- -- -- -- -- No Pain   02/10/25 0800 97.6 °F (36.4 °C) 86 98 % 15 136/63 --   02/10/25 0700 -- 89 97 % 14 126/56 --   02/10/25 0653 -- 88 98 % 17 122/57 --   02/10/25 0545 -- 91 95 % 17 124/59 --   02/10/25 0522 -- 101 95 % 20 199/84 (Abnormal)   --   02/10/25 0518 98.8 °F (37.1 °C) -- -- -- -- --   02/10/25 0500 -- 105 96 % 23 (Abnormal)   205/107 (Abnormal)   --   02/10/25 0430 -- 91 97 % 14 189/83 (Abnormal)   --   02/10/25 0400 -- 82 98 % 12 142/67 --   02/10/25 0330 -- 85 99 % 13 150/71 --   02/10/25 0300 -- 75 98 % 9 (Abnormal)   128/60 --   02/10/25 0300 98.1 °F (36.7 °C) -- -- -- -- --   02/10/25 0230 -- 74 98 % 16 105/55 --   02/10/25 0200 -- 86 100 % 12 156/67 --   02/10/25 0130 -- 82 97 % 18 103/58 --   02/10/25 0100 -- 89 97 % 10 (Abnormal)   110/56 --   02/10/25 0052 99.1 °F (37.3 °C) 88 97 % 18 116/58 --   02/10/25 0030 -- 91 96 % 18 128/59 --   02/10/25 0008 -- -- -- -- -- 9   02/10/25 0000 -- 92 98 % 18 212/81 (Abnormal)   9   02/09/25 2330 -- 94 98 % 17 189/79 (Abnormal)   --   02/09/25 2300 -- 88 99 % 12 161/72 --   02/09/25 2230 -- 87 99 % 15 138/61 --   02/09/25 2200 -- 110 (Abnormal)   100 % 23 (Abnormal)   178/75 (Abnormal)   --   02/09/25 2130 -- 80 98 % 12 115/58 --   02/09/25 2100 -- 80 97 % 11 (Abnormal)   115/56 --   02/09/25 2030 -- 83 97 % 13 125/58 --   02/09/25 2006 -- -- -- -- -- 7   02/09/25 2000 -- 88 98 % 13 119/56 --   02/09/25 1955 -- 85 97 % 12 124/58 --   02/09/25 1852 -- -- -- -- -- 10 - Worst Possible Pain   02/09/25 1830 100.1 °F (37.8 °C) 100 97 % 21 121/57 --   02/09/25 1800 -- 102 96 % 16 128/58 No Pain   02/09/25 1730 -- 100 -- 19 -- --   02/09/25 1715 -- 106 (Abnormal)   99 % 21 134/61 --   02/09/25 1530 -- 112 (Abnormal)   99 % 18 133/80 --   02/09/25 1400 -- 108 (Abnormal)   98 % 18 149/65 --   02/09/25 1225 99.8 °F (37.7 °C) -- -- -- -- --   02/09/25 1217 -- 115 (Abnormal)   99 % 18 122/60 --        Pertinent Labs/Diagnostic Test Results:       Radiology:  XR chest 1 view portable   Final Interpretation by Flavio Miguel MD (02/09 2057)      Hypoinflation limiting evaluation. Mildly prominent hazy perihilar interstitial pulmonary markings bilaterally may reflect interstitial edema or atypical infectious/inflammatory process of the lung parenchyma including viral pneumonia. Recommend clinical    correlation      CT abdomen pelvis with contrast   Final Interpretation by Genaro Garcia MD (02/09 1549)      Inflammatory changes in the right lower quadrant with gas adjacent to the thickened cecum. There are multiple right-sided diverticula and this is most likely a perforated diverticulitis. The terminal ileum and appendix are both inflamed though more    likely secondarily inflamed given the location of the free air adjacent to the cecum.      I personally discussed this study with RUDY ZIEGLER on 2/9/2025 2:24 PM.           Cardiology:    ECG 12 lead   Final Result by Carlos Khan DO (02/10 0647)      Sinus tachycardia   Low voltage QRS   Borderline ECG   When compared with ECG of 09-Feb-2025 15:27, (unconfirmed)   No significant change was found   Confirmed by Carlos Khan ([id number]) on 2/10/2025 6:47:53 AM      ECG 12 lead   Final Result by Carlos Khan DO (02/10 0647)      Sinus tachycardia   Low voltage QRS   Borderline ECG   When compared with ECG of 09-Feb-2025 12:31,   No significant change was found   Confirmed by Carlos Khan ([id number]) on 2/10/2025 6:47:49 AM      ECG 12 lead   Final Result by Carlos Khan DO (02/09 1311)      Sinus tachycardia   Otherwise normal ECG   No previous ECGs available   Confirmed by Carlos Khan ([id number]) on 2/9/2025 1:11:33 PM             Latest Reference Range & Units 02/09/25 13:12 02/10/25 05:00 02/11/25 06:03   WBC 4.31 - 10.16 Thousand/uL 10.47 (H) 7.90 8.66   RBC 3.88 - 5.62 Million/uL 3.56 (L) 2.98 (L) 3.46 (L)   Hemoglobin 12.0 - 17.0  g/dL 10.2 (L) 8.7 (L) 10.1 (L)   Hematocrit 36.5 - 49.3 % 31.3 (L) 26.6 (L) 30.8 (L)   MCV 82 - 98 fL 88 89 89   MCH 26.8 - 34.3 pg 28.7 29.2 29.2   MCHC 31.4 - 37.4 g/dL 32.6 32.7 32.8   RDW 11.6 - 15.1 % 13.5 13.8 13.8   Platelet Count 149 - 390 Thousands/uL 198 155 216   MPV 8.9 - 12.7 fL 10.3 9.5 10.0   Platelet Estimate Adequate  Adequate Adequate    nRBC /100 WBCs   0   Segmented % 43 - 75 % 58 68 84 (H)   Bands % 0 - 8 % 31 (H) 13 (H)    Lymphocytes % 14 - 44 % 6 (L) 11 (L) 7 (L)   Monocytes % 4 - 12 % 5 4 7   Eosinophils % 0 - 6 % 0 4 1   Basophils % 0 - 1 % 0 0 1   Immature Grans % 0 - 2 %   0   Absolute Immature Grans 0.00 - 0.20 Thousand/uL   0.03   Absolute Neutrophils 1.85 - 7.62 Thousands/µL 9.32 (H) 6.40 7.24   Absolute Lymphocytes 0.60 - 4.47 Thousands/µL 0.63 0.87 0.64   Absolute Monocytes 0.17 - 1.22 Thousand/µL 0.52 0.32 0.61   Absolute Eosinophils 0.00 - 0.61 Thousand/µL 0.00 0.32 0.09   Absolute Basophils 0.00 - 0.10 Thousands/µL 0.00 0.00 0.05   RBC Morphology  Normal Present    Renu Cells   Present    Ovalocytes   Present    Schistocytes   Present    Helmet Cells   Present    Anisocytosis   Present    (H): Data is abnormally high  (L): Data is abnormally low       Latest Reference Range & Units 02/09/25 13:12 02/10/25 05:00 02/11/25 06:03   Sodium 135 - 147 mmol/L 131 (L) 133 (L) 133 (L)   Potassium 3.5 - 5.3 mmol/L 3.8 4.5 3.9   Chloride 96 - 108 mmol/L 103 101 104   Carbon Dioxide 21 - 32 mmol/L 22 18 (L) 23   ANION GAP 4 - 13 mmol/L 6 14 (H) 6   BUN 5 - 25 mg/dL 17 8 7   Creatinine 0.60 - 1.30 mg/dL 0.73 0.53 (L) 0.61   GLUCOSE 65 - 140 mg/dL 175 (H) 103 142 (H)   Calcium 8.4 - 10.2 mg/dL 9.0 6.4 (L) 8.5   CORRECTED CALCIUM 8.3 - 10.1 mg/dL 9.5     AST 13 - 39 U/L 22     ALT 7 - 52 U/L 30     ALK PHOS 34 - 104 U/L 45     Total Protein 6.4 - 8.4 g/dL 6.3 (L)     Albumin 3.5 - 5.0 g/dL 3.4 (L)     Total Bilirubin 0.20 - 1.00 mg/dL 0.62     GFR, Calculated ml/min/1.73sq m 93 106 100  "  Phosphorus 2.3 - 4.1 mg/dL  2.8 2.9   MAGNESIUM 1.9 - 2.7 mg/dL 1.4 (L) 2.5 1.9   LIPASE 11 - 82 u/L 17     (L): Data is abnormally low  (H): Data is abnormally high     Latest Reference Range & Units 02/09/25 13:12   PROTIME 12.3 - 15.0 seconds 16.6 (H)   POCT INR 0.85 - 1.19  1.33 (H)   (H): Data is abnormally high     Latest Reference Range & Units 02/09/25 13:12 02/09/25 15:22 02/09/25 17:10   hs TnI 0hr \"Refer to ACS Flowchart\"- see link ng/L 14     hs TnI 2hr \"Refer to ACS Flowchart\"- see link ng/L  13    Delta 2hr hsTnI <20 ng/L  -1    hs TnI 4hr \"Refer to ACS Flowchart\"- see link ng/L   12   Delta 4hr hsTnI <20 ng/L   -2        Latest Reference Range & Units 02/09/25 13:12 02/09/25 19:46   LACTIC ACID 0.5 - 2.0 mmol/L 1.4 0.5   Procalcitonin <=0.25 ng/ml 11.45 (H)    (H): Data is abnormally high      Past Medical History:   Diagnosis Date    Benign prostatic hyperplasia 08/10/2024    History of CVA (cerebrovascular accident) 08/06/2024    Hyperlipidemia 08/06/2024    Neuropathy 09/23/2024    Primary hypertension 08/06/2024    Type 2 diabetes mellitus without complication, without long-term current use of insulin (HCC) 08/06/2024     Present on Admission:   Diverticulitis      Admitting Diagnosis: Perforated diverticulum of large intestine [K57.20]  Abnormal computed tomography of cecum and terminal ileum [R93.3]  Flu-like symptoms [R68.89]  Age/Sex: 69 y.o. male    Network Utilization Review Department  ATTENTION: Please call with any questions or concerns to [phone] and carefully listen to the prompts so that you are directed to the right person. All voicemails are confidential.   For Discharge needs, contact Care Management DC Support Team at [phone] opt. 2  Send all requests for admission clinical reviews, approved or denied determinations and any other requests to dedicated fax number below belonging to the campus where the patient is receiving treatment. List of dedicated fax numbers for the " Facilities:  FACILITY NAME UR FAX NUMBER   ADMISSION DENIALS (Administrative/Medical Necessity) [phone]   DISCHARGE SUPPORT TEAM (NETWORK) [phone]   PARENT CHILD HEALTH (Maternity/NICU/Pediatrics) [phone]   [facility] [phone]   [facility] [phone]   Critical access hospital [phone]   [facility] [phone]   [facility] [phone]   [facility] [phone]   [facility] [phone]   [facility] [phone]   [facility] [phone]   [facility] [phone]   [facility] [phone]   [facility] [phone]

## 2025-02-20 NOTE — DISCHARGE SUMMARY
Discharge Summary - General Surgery   Duncan Sánchez 69 y.o. male MRN: [id number]  Unit/Bed#: [place] [address] -01 Encounter: [id number]    Admission Date: 2025     Discharge Date: 2025    Admitting Diagnosis: Perforated diverticulum of large intestine [K57.20]  Abnormal computed tomography of cecum and terminal ileum [R93.3]  Flu-like symptoms [R68.89]    Discharge Diagnosis: Same    Attending and Service: Nicholas Gaffney MD, general surgery    Consulting Physician(s): None    Procedures Performed: Laparoscopic hand-assisted ileocecectomy 2025    Imagin2025  CT ABDOMEN AND PELVIS WITH IV CONTRAST     INDICATION: Worsening abdominal exam concerning for worsening intra abdominal abscess. .     COMPARISON: 2025     TECHNIQUE: CT examination of the abdomen and pelvis was performed. Multiplanar 2D reformatted images were created from the source data.     This examination, like all CT scans performed in the [facility], was performed utilizing techniques to minimize radiation dose exposure, including the use of iterative reconstruction and automated exposure control. Radiation dose length   product (DLP) for this visit: 454 mGy-cm     IV Contrast: 50 mL of iohexol 100 mL of iohexol  Enteric Contrast: Not administered.     FINDINGS:     ABDOMEN     LOWER CHEST: Small pleural effusions. Patchy groundglass opacities in the dependent lower lobes, likely hypoventilatory but mild CHF cannot be excluded.     LIVER/BILIARY TREE: Poorly outlined 2.1 x 4.2 cm area of hyperenhancement in the left hepatic dome segment 2, not visible on the prior study, possibly due to slightly different post contrast timing as compared to the prior exam. Otherwise unremarkable.     GALLBLADDER: No calcified gallstones. No pericholecystic inflammatory change.     SPLEEN: Unremarkable.     PANCREAS: Unremarkable.     ADRENAL GLANDS: Unremarkable.     KIDNEYS/URETERS: Unremarkable. No hydronephrosis.      STOMACH AND BOWEL: Redemonstrated thickening at the base of the cecum and at the ileocecal junction/terminal ileum with a moderate amount of adjacent stranding. There has been development of a thick-walled gas and fluid collection adjacent to the cecal   serosa just below the ileocecal valve. The collection measures 2.8 x 3.1 x 3.2 cm. There is no oral contrast within. Pericecal abscess is of high concern. There has been extension of the inflammatory changes along the ileocolic vessels up to the level of   the SMV, with apparent thrombosis of the ileocolic vein. The SMV is patent.     APPENDIX: The appendix is patent, mildly dilated but otherwise unremarkable, without wall thickening. As previously, inflammatory changes are centered around the pericecal collection rather than appendix.     ABDOMINOPELVIC CAVITY: Mild low-density ascites. No pneumoperitoneum. No lymphadenopathy.     VESSELS: Unremarkable for patient's age.     PELVIS     REPRODUCTIVE ORGANS: Unremarkable for patient's age.     URINARY BLADDER: Unremarkable.     ABDOMINAL WALL/INGUINAL REGIONS: Unremarkable.     BONES: No acute fracture or suspicious osseous lesion. ORIF hardware in the right femoral neck.        IMPRESSION:  Pericecal thick-walled gas and fluid collection, concerning for pericecal abscess. The etiology is currently unknown. Infected diverticulum is considered but there is no demonstrated communication with the colonic lumen. Abscess related to focal Crohn's   disease is possible but there are no other signs of inflammation and no provided history of Crohn's.  Normal appendix.  Extension of the inflammatory changes along the ileocolic vessels up to the level of the SMV, with apparent thrombosis of the ileocolic vein. The SMV is patent. The ileocolic artery and SMA are patent.  4.2 cm enhancing area in hepatic segment 2, possibly perfusion anomaly or FNH, not fully characterized. Recommend correlation with nonemergent, possibly  outpatient MRI abdomen with and without contrast.      Hospital Course:   Duncan Sánchez is a 69 y.o. male with medical comorbidities including non-insulin-dependent type 2 diabetes, hypertension who presented 2/9/2025 with abdominal pain, nausea without vomiting.    Patient was initially treated conservatively with nonsurgical management, bowel rest, cautious diet advancement.  Patient continued to have abdominal pain, distention, was not tolerating a diet and the patient was taken to the operating room on 2/14/2025. Intraoperative findings included: Phlegmon involving terminal ileum, cecum, and appendix.  En bloc resection of TI, cecum, appendix with stapled side-to-side functional end to end anastomosis. The patient hospital course was uncomplicated.  He did have, for which she completed a 10-day course of susceptible antibiotics.    Patient was discharged on HD10/POD5. On the day of discharge, the patient was voiding spontaneously, ambulating at baseline, tolerating a regular diet, and pain was well controlled. The patient was sent home with medication for pain. He understood all instructions for discharge. He was also given the names and numbers of the providers as well as instructions for follow up appointments.     Condition at Discharge: good     Discharge instructions/Information to patient and family:   See after visit summary for information provided to patient and family.      Provisions for Follow-Up Care:  See after visit summary for information related to follow-up care and any pertinent home health orders.      Disposition: Home    Planned Readmission: No    Discharge Statement   I spent 30 minutes discharging the patient. This time was spent on the day of discharge. I had direct contact with the patient on the day of discharge. Additional documentation is required if more than 30 minutes were spent on discharge.     Discharge Medications:  See after visit summary for reconciled discharge  medications provided to patient and family.    Cookie Baires PA-C  2/20/2025

## 2025-02-20 NOTE — NURSING NOTE
Patient left via WC van. AVS reviewed with patient by gabriella RN, IV removed by gabriella RN. Belongings sent with patient.

## 2025-02-21 ENCOUNTER — TELEPHONE (OUTPATIENT)
Dept: FAMILY MEDICINE CLINIC | Facility: CLINIC | Age: 70
End: 2025-02-21

## 2025-02-21 ENCOUNTER — HOME CARE VISIT (OUTPATIENT)
Dept: HOME HEALTH SERVICES | Facility: HOME HEALTHCARE | Age: 70
End: 2025-02-21

## 2025-02-21 ENCOUNTER — TRANSITIONAL CARE MANAGEMENT (OUTPATIENT)
Dept: FAMILY MEDICINE CLINIC | Facility: CLINIC | Age: 70
End: 2025-02-21

## 2025-02-21 ENCOUNTER — HOME CARE VISIT (OUTPATIENT)
Dept: HOME HEALTH SERVICES | Facility: HOME HEALTHCARE | Age: 70
End: 2025-02-21
Payer: COMMERCIAL

## 2025-02-21 PROCEDURE — 400013 VN SOC

## 2025-02-21 PROCEDURE — G0151 HHCP-SERV OF PT,EA 15 MIN: HCPCS

## 2025-02-23 VITALS — DIASTOLIC BLOOD PRESSURE: 62 MMHG | SYSTOLIC BLOOD PRESSURE: 140 MMHG | OXYGEN SATURATION: 97 % | HEART RATE: 84 BPM

## 2025-02-24 ENCOUNTER — HOME CARE VISIT (OUTPATIENT)
Dept: HOME HEALTH SERVICES | Facility: HOME HEALTHCARE | Age: 70
End: 2025-02-24
Payer: COMMERCIAL

## 2025-02-24 NOTE — CASE COMMUNICATION
Medication discrepancies or Major drug interactions: NA  Abnormal clinical findings: No follow up appts scheduled. Advised pt to schedule  This report is informational only, no response is needed  [facility] has Admitted your patient to Home Health service with the following disciplines: PT and OT  Patient stated goals of care: To get the legs stronger and to feel more balanced  Potential barriers to goal achievement: Fatigues quick ly, limited mobility, forgetful  Primary focus of home health care:Digestive  Anticipated visit pattern and next visit date: 1wk1, 2wk3. Next PT visit 2/25/25  Thank you for allowing us to participate in the care of your patient.      Stephanie DIANET

## 2025-02-25 ENCOUNTER — HOME CARE VISIT (OUTPATIENT)
Dept: HOME HEALTH SERVICES | Facility: HOME HEALTHCARE | Age: 70
End: 2025-02-25
Payer: COMMERCIAL

## 2025-02-25 VITALS — SYSTOLIC BLOOD PRESSURE: 132 MMHG | OXYGEN SATURATION: 98 % | HEART RATE: 80 BPM | DIASTOLIC BLOOD PRESSURE: 62 MMHG

## 2025-02-25 VITALS — OXYGEN SATURATION: 97 % | HEART RATE: 88 BPM

## 2025-02-25 PROCEDURE — G0152 HHCP-SERV OF OT,EA 15 MIN: HCPCS

## 2025-02-25 PROCEDURE — G0151 HHCP-SERV OF PT,EA 15 MIN: HCPCS

## 2025-02-27 ENCOUNTER — HOME CARE VISIT (OUTPATIENT)
Dept: HOME HEALTH SERVICES | Facility: HOME HEALTHCARE | Age: 70
End: 2025-02-27
Payer: COMMERCIAL

## 2025-02-27 PROCEDURE — G0151 HHCP-SERV OF PT,EA 15 MIN: HCPCS

## 2025-02-28 ENCOUNTER — HOME CARE VISIT (OUTPATIENT)
Dept: HOME HEALTH SERVICES | Facility: HOME HEALTHCARE | Age: 70
End: 2025-02-28
Payer: COMMERCIAL

## 2025-02-28 VITALS — DIASTOLIC BLOOD PRESSURE: 70 MMHG | SYSTOLIC BLOOD PRESSURE: 140 MMHG | HEART RATE: 90 BPM | OXYGEN SATURATION: 97 %

## 2025-02-28 PROCEDURE — G0152 HHCP-SERV OF OT,EA 15 MIN: HCPCS

## 2025-03-01 VITALS — HEART RATE: 75 BPM | DIASTOLIC BLOOD PRESSURE: 62 MMHG | SYSTOLIC BLOOD PRESSURE: 136 MMHG | OXYGEN SATURATION: 97 %

## 2025-03-01 PROCEDURE — G0180 MD CERTIFICATION HHA PATIENT: HCPCS | Performed by: SURGERY

## 2025-03-04 ENCOUNTER — TELEPHONE (OUTPATIENT)
Dept: FAMILY MEDICINE CLINIC | Facility: CLINIC | Age: 70
End: 2025-03-04

## 2025-03-05 ENCOUNTER — HOME CARE VISIT (OUTPATIENT)
Dept: HOME HEALTH SERVICES | Facility: HOME HEALTHCARE | Age: 70
End: 2025-03-05
Payer: COMMERCIAL

## 2025-03-05 ENCOUNTER — TELEPHONE (OUTPATIENT)
Dept: FAMILY MEDICINE CLINIC | Facility: CLINIC | Age: 70
End: 2025-03-05

## 2025-03-05 VITALS — SYSTOLIC BLOOD PRESSURE: 120 MMHG | HEART RATE: 88 BPM | OXYGEN SATURATION: 97 % | DIASTOLIC BLOOD PRESSURE: 64 MMHG

## 2025-03-05 PROCEDURE — G0151 HHCP-SERV OF PT,EA 15 MIN: HCPCS

## 2025-03-05 PROCEDURE — G0152 HHCP-SERV OF OT,EA 15 MIN: HCPCS

## 2025-03-05 NOTE — TELEPHONE ENCOUNTER
Called and lvm to patient w/ instructions to call back to reschedule 3/10/25 appointment with PCP.

## 2025-03-05 NOTE — TELEPHONE ENCOUNTER
----- Message from RAKAN Cornejo sent at 3/3/2025  3:47 PM EST -----  This patient's appointment with Reuben was cancelled on 3/10 - can you get him rescheduled with her for after she is back? TY

## 2025-03-06 ENCOUNTER — HOME CARE VISIT (OUTPATIENT)
Dept: HOME HEALTH SERVICES | Facility: HOME HEALTHCARE | Age: 70
End: 2025-03-06
Payer: COMMERCIAL

## 2025-03-06 VITALS — OXYGEN SATURATION: 96 % | HEART RATE: 92 BPM | SYSTOLIC BLOOD PRESSURE: 132 MMHG | DIASTOLIC BLOOD PRESSURE: 62 MMHG

## 2025-03-06 NOTE — TELEPHONE ENCOUNTER
third attempt to contact patient. no answer. Unable to leave message as voicemail is full. SMS sent. Letter Sent

## 2025-03-07 ENCOUNTER — HOME CARE VISIT (OUTPATIENT)
Dept: HOME HEALTH SERVICES | Facility: HOME HEALTHCARE | Age: 70
End: 2025-03-07
Payer: COMMERCIAL

## 2025-03-07 PROCEDURE — G0151 HHCP-SERV OF PT,EA 15 MIN: HCPCS

## 2025-03-07 PROCEDURE — G0152 HHCP-SERV OF OT,EA 15 MIN: HCPCS

## 2025-03-09 VITALS — OXYGEN SATURATION: 97 % | HEART RATE: 79 BPM | SYSTOLIC BLOOD PRESSURE: 138 MMHG | DIASTOLIC BLOOD PRESSURE: 66 MMHG

## 2025-03-09 VITALS — HEART RATE: 100 BPM | OXYGEN SATURATION: 98 %

## 2025-03-09 NOTE — CASE COMMUNICATION
PT DC from home PT as pt has no further goals for home therapy and has returned to baseline mobility. PT advised to schedule follow up appt. Number provided for PCP office.  Pt phone does not work. May call caregiver Nichelle [phone]

## 2025-03-11 ENCOUNTER — HOME CARE VISIT (OUTPATIENT)
Dept: HOME HEALTH SERVICES | Facility: HOME HEALTHCARE | Age: 70
End: 2025-03-11
Payer: COMMERCIAL

## 2025-03-11 VITALS — HEART RATE: 96 BPM | DIASTOLIC BLOOD PRESSURE: 60 MMHG | SYSTOLIC BLOOD PRESSURE: 128 MMHG | OXYGEN SATURATION: 98 %

## 2025-03-11 PROCEDURE — G0152 HHCP-SERV OF OT,EA 15 MIN: HCPCS

## 2025-03-26 ENCOUNTER — OFFICE VISIT (OUTPATIENT)
Dept: FAMILY MEDICINE CLINIC | Facility: CLINIC | Age: 70
End: 2025-03-26

## 2025-03-26 VITALS
OXYGEN SATURATION: 98 % | TEMPERATURE: 97.1 F | HEART RATE: 95 BPM | DIASTOLIC BLOOD PRESSURE: 72 MMHG | SYSTOLIC BLOOD PRESSURE: 140 MMHG | RESPIRATION RATE: 16 BRPM | HEIGHT: 61 IN | BODY MASS INDEX: 27.38 KG/M2 | WEIGHT: 145 LBS

## 2025-03-26 DIAGNOSIS — K57.20 PERFORATED DIVERTICULUM OF LARGE INTESTINE: Primary | ICD-10-CM

## 2025-03-26 DIAGNOSIS — N40.0 BENIGN PROSTATIC HYPERPLASIA, UNSPECIFIED WHETHER LOWER URINARY TRACT SYMPTOMS PRESENT: ICD-10-CM

## 2025-03-26 DIAGNOSIS — K21.9 GASTROESOPHAGEAL REFLUX DISEASE, UNSPECIFIED WHETHER ESOPHAGITIS PRESENT: ICD-10-CM

## 2025-03-26 DIAGNOSIS — E78.5 HYPERLIPIDEMIA, UNSPECIFIED HYPERLIPIDEMIA TYPE: ICD-10-CM

## 2025-03-26 DIAGNOSIS — I10 PRIMARY HYPERTENSION: ICD-10-CM

## 2025-03-26 DIAGNOSIS — T75.3XXA MOTION SICKNESS, INITIAL ENCOUNTER: ICD-10-CM

## 2025-03-26 DIAGNOSIS — G89.29 CHRONIC PAIN OF BOTH SHOULDERS: ICD-10-CM

## 2025-03-26 DIAGNOSIS — M25.511 CHRONIC PAIN OF BOTH SHOULDERS: ICD-10-CM

## 2025-03-26 DIAGNOSIS — M54.50 ACUTE LEFT-SIDED LOW BACK PAIN WITHOUT SCIATICA: ICD-10-CM

## 2025-03-26 DIAGNOSIS — E11.9 TYPE 2 DIABETES MELLITUS WITHOUT COMPLICATION, WITHOUT LONG-TERM CURRENT USE OF INSULIN (HCC): ICD-10-CM

## 2025-03-26 DIAGNOSIS — M25.512 CHRONIC PAIN OF BOTH SHOULDERS: ICD-10-CM

## 2025-03-26 DIAGNOSIS — G62.9 NEUROPATHY: ICD-10-CM

## 2025-03-26 PROCEDURE — 3077F SYST BP >= 140 MM HG: CPT | Performed by: PHYSICIAN ASSISTANT

## 2025-03-26 PROCEDURE — 99215 OFFICE O/P EST HI 40 MIN: CPT | Performed by: PHYSICIAN ASSISTANT

## 2025-03-26 PROCEDURE — 3078F DIAST BP <80 MM HG: CPT | Performed by: PHYSICIAN ASSISTANT

## 2025-03-26 RX ORDER — SIMVASTATIN 40 MG
40 TABLET ORAL EVERY EVENING
Qty: 90 TABLET | Refills: 1 | Status: SHIPPED | OUTPATIENT
Start: 2025-03-26

## 2025-03-26 RX ORDER — GABAPENTIN 300 MG/1
300 CAPSULE ORAL 2 TIMES DAILY
Qty: 180 CAPSULE | Refills: 1 | Status: SHIPPED | OUTPATIENT
Start: 2025-03-26

## 2025-03-26 RX ORDER — TAMSULOSIN HYDROCHLORIDE 0.4 MG/1
0.4 CAPSULE ORAL DAILY
Qty: 90 CAPSULE | Refills: 1 | Status: SHIPPED | OUTPATIENT
Start: 2025-03-26

## 2025-03-26 RX ORDER — CYCLOBENZAPRINE HCL 10 MG
10 TABLET ORAL 2 TIMES DAILY PRN
Qty: 60 TABLET | Refills: 2 | Status: SHIPPED | OUTPATIENT
Start: 2025-03-26

## 2025-03-26 RX ORDER — LOSARTAN POTASSIUM 50 MG/1
50 TABLET ORAL DAILY
Qty: 90 TABLET | Refills: 1 | Status: SHIPPED | OUTPATIENT
Start: 2025-03-26

## 2025-03-26 RX ORDER — MECLIZINE HYDROCHLORIDE 25 MG/1
25 TABLET ORAL 3 TIMES DAILY PRN
Qty: 30 TABLET | Refills: 1 | Status: SHIPPED | OUTPATIENT
Start: 2025-03-26

## 2025-03-26 NOTE — ASSESSMENT & PLAN NOTE
-Stable.  Continue flexeril, BID PRN.   -Advised to apply ice/heating pad/topical therapies as needed to affected area.  - Provided patient with list of exercises to perform daily.  - If symptoms persist/worsen, would recommend referral to physical therapy.     Orders:    cyclobenzaprine (FLEXERIL) 10 mg tablet; Take 1 tablet (10 mg total) by mouth 2 (two) times a day as needed for muscle spasms

## 2025-03-26 NOTE — PATIENT INSTRUCTIONS
[facility]'s General Surgery   Please contact us at [phone] to schedule your appointment.    [facility] - Dr. Edward Schmitz ([facility]– [place])  [phone]

## 2025-03-26 NOTE — ASSESSMENT & PLAN NOTE
-Reviewed orthopedic note from 1/14/2025.  Patient referred to comprehensive spine PT.  Patient is planning to schedule this once he comes back from his upcoming trip.  -Reviewed lumbar spine x-ray from 10/21/2024.  Results show mild multilevel spondylotic degenerative changes of lumbar spine.  Mild chronic appearing anterior wedge deformity of L1 vertebral body.  -Continue flexeril, BID PRN.  Discussed possible side effects of drowsiness and advised to avoid driving or operating heavy machinery when taking medication.  -Advised to apply ice/heating pad/topical therapies as needed to affected area.  - Provided patient with list of exercises to perform daily.      -Continue follow-up with orthopedics as scheduled.  Orders:    cyclobenzaprine (FLEXERIL) 10 mg tablet; Take 1 tablet (10 mg total) by mouth 2 (two) times a day as needed for muscle spasms

## 2025-03-26 NOTE — ASSESSMENT & PLAN NOTE
- Continue metformin 1000 mg twice daily and Januvia 100 mg daily.  - Continue following diabetic diet with focus on low intake of carbohydrates and sugars.  - Reviewed podiatry note from January 2025.  Continue follow-up as scheduled.  - Patient unsure when last diabetic eye exam was.  Patient requesting to see an eye doctor.  Referral previously placed.  Advised to call to schedule an appointment.  -Reviewed albumin/creatinine urine ratio from September 2024.  Results are normal.    Lab Results   Component Value Date    HGBA1C 7.4 (A) 01/08/2025    HGBA1C 6.2 08/10/2024       Orders:    metFORMIN (GLUCOPHAGE) 1000 MG tablet; Take 1 tablet (1,000 mg total) by mouth 2 (two) times a day with meals    sitaGLIPtin (Januvia) 100 mg tablet; Take 1 tablet (100 mg total) by mouth daily

## 2025-03-26 NOTE — ASSESSMENT & PLAN NOTE
-Stable.  Continue gabapentin 300 mg twice daily.  Orders:    gabapentin (Neurontin) 300 mg capsule; Take 1 capsule (300 mg total) by mouth 2 (two) times a day

## 2025-03-26 NOTE — PROGRESS NOTES
Transition of Care Visit  Name: Duncan Sánchez      : 1955      MRN: [id number]  Encounter Provider: Mena Alexis PA-C  Encounter Date: 3/26/2025   Encounter department: [facility]    Assessment & Plan  Perforated diverticulum of large intestine  -Reviewed hospital stay notes from 2025 - 2025.  - Reviewed CT abdomen pelvis with contrast from 25. Results concerning for perforated right sided diverticulitis associated with TI and appendix inflammation.   - S/p En bloc resection of TI, cecum appendix with stapled side-to-side functional end-to-end anastomosis on 25.   - Patient has now completed home PT/OT/VNA services.   - Today, patient feeling well. Eating and drinking without difficulty. Bowel movements normal.   Orders:    Ambulatory Referral to General Surgery; Future    Gastroesophageal reflux disease, unspecified whether esophagitis present  -Patient requesting to start Nexium.  Will prescribe Nexium 20 mg daily.  - Reviewed the causes of heartburn. Discussed importance of diet and lifestyle modifications to control GERD symptoms. Avoid things which worsen heartburn (Ex: Caffiene, tomato based products, spicy foods, tobacco, alcohol, obesity, tight-fitting clothing.) Discussed importance of eating small frequent meals instead of large meals. While laying down/sleeping instructed to refrain from laying flat and instead, prop pillows up behind their back. Instructed to not lay down 2-3 hours following a meal.   Orders:    esomeprazole (NexIUM) 20 mg capsule; Take 1 capsule (20 mg total) by mouth daily in the early morning    Hyperlipidemia, unspecified hyperlipidemia type  -Continue simvastatin 40 mg daily.  Orders:    simvastatin (ZOCOR) 40 mg tablet; Take 1 tablet (40 mg total) by mouth every evening    Benign prostatic hyperplasia, unspecified whether lower urinary tract symptoms present  -Stable.  Continue tamsulosin 0.4 mg  daily.  Orders:    tamsulosin (FLOMAX) 0.4 mg; Take 1 capsule (0.4 mg total) by mouth daily    Chronic pain of both shoulders  -Stable.  Continue flexeril, BID PRN.   -Advised to apply ice/heating pad/topical therapies as needed to affected area.  - Provided patient with list of exercises to perform daily.  - If symptoms persist/worsen, would recommend referral to physical therapy.     Orders:    cyclobenzaprine (FLEXERIL) 10 mg tablet; Take 1 tablet (10 mg total) by mouth 2 (two) times a day as needed for muscle spasms    Acute left-sided low back pain without sciatica  -Reviewed orthopedic note from 1/14/2025.  Patient referred to comprehensive spine PT.  Patient is planning to schedule this once he comes back from his upcoming trip.  -Reviewed lumbar spine x-ray from 10/21/2024.  Results show mild multilevel spondylotic degenerative changes of lumbar spine.  Mild chronic appearing anterior wedge deformity of L1 vertebral body.  -Continue flexeril, BID PRN.  Discussed possible side effects of drowsiness and advised to avoid driving or operating heavy machinery when taking medication.  -Advised to apply ice/heating pad/topical therapies as needed to affected area.  - Provided patient with list of exercises to perform daily.      -Continue follow-up with orthopedics as scheduled.  Orders:    cyclobenzaprine (FLEXERIL) 10 mg tablet; Take 1 tablet (10 mg total) by mouth 2 (two) times a day as needed for muscle spasms    Neuropathy  -Stable.  Continue gabapentin 300 mg twice daily.  Orders:    gabapentin (Neurontin) 300 mg capsule; Take 1 capsule (300 mg total) by mouth 2 (two) times a day    Primary hypertension  - Continue losartan 50 mg once daily.   - Currently blood pressure is controlled at this time.  Reviewed BP target goal with patient.    - Continue to maintain healthy balanced diet with focus on low salt intake. Limit alcohol intake.   - Advised to exercise at least 30 minutes a day for at least 5 days out  of the week.   Orders:    losartan (COZAAR) 50 mg tablet; Take 1 tablet (50 mg total) by mouth daily    Type 2 diabetes mellitus without complication, without long-term current use of insulin (HCC)  - Continue metformin 1000 mg twice daily and Januvia 100 mg daily.  - Continue following diabetic diet with focus on low intake of carbohydrates and sugars.  - Reviewed podiatry note from January 2025.  Continue follow-up as scheduled.  - Patient unsure when last diabetic eye exam was.  Patient requesting to see an eye doctor.  Referral previously placed.  Advised to call to schedule an appointment.  -Reviewed albumin/creatinine urine ratio from September 2024.  Results are normal.    Lab Results   Component Value Date    HGBA1C 7.4 (A) 01/08/2025    HGBA1C 6.2 08/10/2024       Orders:    metFORMIN (GLUCOPHAGE) 1000 MG tablet; Take 1 tablet (1,000 mg total) by mouth 2 (two) times a day with meals    sitaGLIPtin (Januvia) 100 mg tablet; Take 1 tablet (100 mg total) by mouth daily    Motion sickness, initial encounter  -Will prescribe meclizine 25 mg, as needed for motion sickness.  Orders:    meclizine (ANTIVERT) 25 mg tablet; Take 1 tablet (25 mg total) by mouth 3 (three) times a day as needed for dizziness or nausea         History of Present Illness     Transitional Care Management Review:   Duncan Sánchez is a 70 y.o. male here for TCM follow up.     During the TCM phone call patient stated:  TCM Call (since 3/12/2025)       None          TCM Call (since 3/12/2025)       None            Patient is a 70 y.o. male whom  has a past medical history of Benign prostatic hyperplasia (08/10/2024), History of CVA (cerebrovascular accident) (08/06/2024), Hyperlipidemia (08/06/2024), Neuropathy (09/23/2024), Primary hypertension (08/06/2024), and Type 2 diabetes mellitus without complication, without long-term current use of insulin (HCC) (08/06/2024). who is seen today in office for TCM.  Patient is accompanied today by his  caregiver.    -Since last visit, patient was hospitalized at [facility] from 2/9/2025 - 2/19/2025.  Patient originally presented to the ED with abdominal pain, nausea without vomiting.  Patient initially treated conservatively with nonsurgical management, bowel rest.  Patient continued to have abdominal pain, distention and taken to the operating room on 2/14/2025.  Intraoperative findings included phlegmon involving terminal ileum, cecum, appendix.  En bloc resection of TI, cecum appendix with stapled side-to-side functional end-to-end anastomosis.  Hospital course uncomplicated.  Patient did complete 10-day course of antibiotics.  Patient discharged to home with home PT on 2/19/2025.    -Today, patient notes overall he is feeling very well.  Patient notes he feels that he has increased some of his strength since receiving home PT.  Patient denies any fevers, chills, nausea, vomiting, diarrhea, constipation.  Patient notes he does have some occasional pain around the incision sites, but nothing too major.  Patient notes he has been able to drink and eat without any issues.    -Patient notes recently he has been experiencing a lot of acid reflux.  Patient notes he has been taking Pepto-Bismol and drinking milk to help.  He would like to try Nexium.    -Patient notes if he is in the passenger seat or in the backseat of a car, then he gets motion sickness with dizziness, nausea, vomiting.  Patient notes he does not have any symptoms when he is on a plane.    Review of Systems   Constitutional:  Negative for chills and fever.   HENT:  Negative for congestion and sore throat.    Eyes:  Negative for pain and visual disturbance.   Respiratory:  Negative for cough, chest tightness and shortness of breath.    Cardiovascular:  Negative for chest pain, palpitations and leg swelling.   Gastrointestinal:  Negative for abdominal pain, constipation, diarrhea, nausea and vomiting.        Acid reflux  "  Genitourinary:  Negative for difficulty urinating, dysuria and frequency.   Musculoskeletal:  Positive for arthralgias, back pain and gait problem.   Skin:  Negative for rash.   Neurological:  Positive for numbness. Negative for dizziness and headaches.     Objective   /72 (BP Location: Left arm, Patient Position: Sitting, Cuff Size: Standard)   Pulse 95   Temp (!) 97.1 °F (36.2 °C) (Temporal)   Resp 16   Ht 5' 1\" (1.549 m)   Wt 65.8 kg (145 lb)   SpO2 98%   BMI 27.40 kg/m²     Physical Exam  Vitals and nursing note reviewed.   Constitutional:       General: He is not in acute distress.     Appearance: Normal appearance. He is well-developed.      Comments: Uses cane with ambulation   HENT:      Head: Normocephalic and atraumatic.      Right Ear: External ear normal.      Left Ear: External ear normal.      Nose: Nose normal.      Mouth/Throat:      Pharynx: Uvula midline. No posterior oropharyngeal erythema.   Eyes:      Conjunctiva/sclera: Conjunctivae normal.   Cardiovascular:      Rate and Rhythm: Normal rate and regular rhythm.      Pulses: Normal pulses.      Heart sounds: Normal heart sounds. No murmur heard.  Pulmonary:      Effort: Pulmonary effort is normal. No respiratory distress.      Breath sounds: Normal breath sounds. No wheezing.   Abdominal:      General: Bowel sounds are normal.      Palpations: Abdomen is soft.      Tenderness: There is no abdominal tenderness.      Comments: Surgical incisions clean, dry, intact.  Nontender to palpation.   Musculoskeletal:      Cervical back: Normal range of motion and neck supple.   Skin:     General: Skin is warm and dry.      Capillary Refill: Capillary refill takes less than 2 seconds.   Neurological:      Mental Status: He is alert and oriented to person, place, and time.      Gait: Gait abnormal.   Psychiatric:         Speech: Speech normal.         Behavior: Behavior normal.       Medications have been reviewed by provider in current " encounter    - Utilized [employer] services for translation.      Administrative Statements   I have spent a total time of 40 minutes in caring for this patient on the day of the visit/encounter including Diagnostic results, Prognosis, Instructions for management, Patient and family education, Importance of tx compliance, Risk factor reductions, Counseling / Coordination of care, Documenting in the medical record, Reviewing/placing orders in the medical record (including tests, medications, and/or procedures), and Obtaining or reviewing history  .

## 2025-03-26 NOTE — ASSESSMENT & PLAN NOTE
-Stable.  Continue tamsulosin 0.4 mg daily.  Orders:    tamsulosin (FLOMAX) 0.4 mg; Take 1 capsule (0.4 mg total) by mouth daily

## 2025-04-01 ENCOUNTER — OFFICE VISIT (OUTPATIENT)
Dept: SURGERY | Facility: CLINIC | Age: 70
End: 2025-04-01

## 2025-04-01 VITALS
HEIGHT: 61 IN | BODY MASS INDEX: 26.62 KG/M2 | SYSTOLIC BLOOD PRESSURE: 138 MMHG | HEART RATE: 112 BPM | DIASTOLIC BLOOD PRESSURE: 80 MMHG | WEIGHT: 141 LBS | OXYGEN SATURATION: 96 % | TEMPERATURE: 96.7 F

## 2025-04-01 DIAGNOSIS — K57.20 PERFORATED DIVERTICULUM OF LARGE INTESTINE: ICD-10-CM

## 2025-04-01 DIAGNOSIS — K59.09 OTHER CONSTIPATION: ICD-10-CM

## 2025-04-01 DIAGNOSIS — Z90.49 STATUS POST SMALL BOWEL RESECTION: ICD-10-CM

## 2025-04-01 DIAGNOSIS — Z12.11 ENCOUNTER FOR SCREENING COLONOSCOPY: Primary | ICD-10-CM

## 2025-04-01 PROCEDURE — 99024 POSTOP FOLLOW-UP VISIT: CPT | Performed by: SURGERY

## 2025-04-02 PROBLEM — Z90.49 STATUS POST SMALL BOWEL RESECTION: Status: ACTIVE | Noted: 2025-04-02

## 2025-04-02 NOTE — ASSESSMENT & PLAN NOTE
It has been 6 weeks since his surgery and he is doing well at home overall.  His pathology was consistent with small bowel perforation and abscess but no malignancy.  He has no lifting restrictions and can follow-up as needed

## 2025-04-02 NOTE — ASSESSMENT & PLAN NOTE
For his constipation I recommend high-fiber diet and supplementation with either Metamucil or MiraLAX.  He needs to follow-up with gastroenterology as his colonoscopy from January showed a poor prep and needs completion colonoscopy.  They can also help manage his constipation.    Orders:    Ambulatory Referral to Gastroenterology; Future

## 2025-04-02 NOTE — PROGRESS NOTES
"Name: Duncan Sánchez      : 1955      MRN: [id number]  Encounter Provider: Nicholas Gaffney MD  Encounter Date: 2025   Encounter department: [facility] [place]  :  Assessment & Plan  Perforated diverticulum of large intestine    Orders:    Ambulatory Referral to General Surgery    Encounter for screening colonoscopy    Orders:    Ambulatory Referral to Gastroenterology; Future    Other constipation  For his constipation I recommend high-fiber diet and supplementation with either Metamucil or MiraLAX.  He needs to follow-up with gastroenterology as his colonoscopy from January showed a poor prep and needs completion colonoscopy.  They can also help manage his constipation.    Orders:    Ambulatory Referral to Gastroenterology; Future    Status post small bowel resection  It has been 6 weeks since his surgery and he is doing well at home overall.  His pathology was consistent with small bowel perforation and abscess but no malignancy.  He has no lifting restrictions and can follow-up as needed             History of Present Illness   Duncan Sánchez is a 70 y.o. male who presents for follow-up from hospitalization for for follow-up from hospitalization for perforated small bowel requiring ileo-cecectomy.  The official  line was used for the visit.  He has some complaints of right lower quadrant pain near his incision site.  He has some difficulty with constipation.  He denies fevers or chills.  HPI  Review of Systems as per HPI.       Objective   /80 (BP Location: Left arm, Patient Position: Sitting, Cuff Size: Standard)   Pulse (!) 112   Temp (!) 96.7 °F (35.9 °C) (Temporal)   Ht 5' 1\" (1.549 m)   Wt 64 kg (141 lb)   SpO2 96%   BMI 26.64 kg/m²      Physical Exam  Abdominal:      Comments: Soft mild discomfort right side.  Incisions well-healed                 "

## 2025-04-03 ENCOUNTER — OFFICE VISIT (OUTPATIENT)
Dept: OBGYN CLINIC | Facility: CLINIC | Age: 70
End: 2025-04-03
Payer: MEDICARE

## 2025-04-03 DIAGNOSIS — M65.311 TRIGGER THUMB OF RIGHT HAND: Primary | ICD-10-CM

## 2025-04-03 PROCEDURE — 20550 NJX 1 TENDON SHEATH/LIGAMENT: CPT | Performed by: SURGERY

## 2025-04-03 PROCEDURE — 99203 OFFICE O/P NEW LOW 30 MIN: CPT | Performed by: SURGERY

## 2025-04-03 RX ORDER — LIDOCAINE HYDROCHLORIDE 10 MG/ML
2.5 INJECTION, SOLUTION INFILTRATION; PERINEURAL
Status: COMPLETED | OUTPATIENT
Start: 2025-04-03 | End: 2025-04-03

## 2025-04-03 RX ORDER — BETAMETHASONE SODIUM PHOSPHATE AND BETAMETHASONE ACETATE 3; 3 MG/ML; MG/ML
3 INJECTION, SUSPENSION INTRA-ARTICULAR; INTRALESIONAL; INTRAMUSCULAR; SOFT TISSUE
Status: COMPLETED | OUTPATIENT
Start: 2025-04-03 | End: 2025-04-03

## 2025-04-03 RX ADMIN — BETAMETHASONE SODIUM PHOSPHATE AND BETAMETHASONE ACETATE 3 MG: 3; 3 INJECTION, SUSPENSION INTRA-ARTICULAR; INTRALESIONAL; INTRAMUSCULAR; SOFT TISSUE at 14:00

## 2025-04-03 RX ADMIN — LIDOCAINE HYDROCHLORIDE 2.5 ML: 10 INJECTION, SOLUTION INFILTRATION; PERINEURAL at 14:00

## 2025-04-03 NOTE — PROGRESS NOTES
ORTHOPAEDIC HAND, WRIST, AND ELBOW OFFICE  VISIT       ASSESSMENT/PLAN:      70 y.o. year old male who presents with    Assessment & Plan  Trigger thumb of right hand  Physical exam was performed and the findings are consistent with trigger finger  Injections were offered for trigger fingers and we discussed they are 75% effective at resolving symptoms. They may have a repeat injection in 3 months if they get good relief of symptoms or full resolution that returns   Pt was offered, accepted, performed and steroid injection thumb A1 pulleys for symptomatic relief. Pt tolerated injections well. Ice and post injection protocol advised. Weigh bearing activities as tolerated.  NSAID's as needed for pain  Activities as tolerated and increase as able.  No specific restrictions from our standpoint.  Discussed pt must wait 3 months from injection for surgery  Discussed to monitor blood sugars for a few days  Orders:    Hand/upper extremity injection: R thumb A1      The patient verbalized understanding of exam findings and treatment plan. We engaged in the shared decision-making process and treatment options were discussed at length with the patient. Surgical and conservative management discussed today along with risks and benefits.        Follow Up:  Return if symptoms worsen or fail to improve.    General Discussions:  Trigger Finger: The anatomy and physiology of trigger finger was discussed with the patient today in the office.  Edema and increased contact pressure within the flexor tendons at the A1 pulley can cause pain, crepitation, and triggering or locking of the digit resulting in limitation of function.  Symptoms can occur at anytime but are typically worse in the morning or after a brief rest from repetitive activity.  Treatment options include resting/nighttime MP blocking splints to decrease edema, oral anti-inflammatory medications, home or formal therapy exercises, up to 2 steroid injections within the tendon  sheath, or surgical release.  While majority of patients do respond to conservative treatment, up to 20% may require surgical release.     _________________________________________________________________________________________________________________________________________      CHIEF COMPLAINT:  Chief Complaint   Patient presents with    Right Hand - Pain       SUBJECTIVE:  Duncan Sánchez is a 70 y.o. year old male who presents for evaluation Right thumb pain      Patient states that he has been R thumb locking/catching and pain for about 2-3 months  He cannot recall any inujires to this thumb. He states he has been living with the pain and locking and feels it is getting worse. The thumb is causing issues with ALD's at times    He is diabetic    I have personally reviewed all the relevant PMH, PSH, SH, FH, Medications and allergies      PAST MEDICAL HISTORY:  Past Medical History:   Diagnosis Date    Benign prostatic hyperplasia 08/10/2024    History of CVA (cerebrovascular accident) 08/06/2024    Hyperlipidemia 08/06/2024    Neuropathy 09/23/2024    Primary hypertension 08/06/2024    Type 2 diabetes mellitus without complication, without long-term current use of insulin (HCC) 08/06/2024       PAST SURGICAL HISTORY:  Past Surgical History:   Procedure Laterality Date    HEMICOLOECTOMY W/ ANASTOMOSIS N/A 2/14/2025    Procedure: LAP ILEOCOLIC RESECTION, HAND-ASSISTED;  Surgeon: Pablo Conner MD;  Location: [facility] OR;  Service: General       FAMILY HISTORY:  No family history on file.    SOCIAL HISTORY:  Social History     Tobacco Use    Smoking status: Never     Passive exposure: Never    Smokeless tobacco: Never   Vaping Use    Vaping status: Never Used   Substance Use Topics    Alcohol use: Yes     Comment: socially    Drug use: Never       MEDICATIONS:    Current Outpatient Medications:     acetaminophen (TYLENOL) 325 mg tablet, Take 650 mg by mouth every 6 (six) hours as needed for mild pain, Disp: ,  Rfl:     cyclobenzaprine (FLEXERIL) 10 mg tablet, Take 1 tablet (10 mg total) by mouth 2 (two) times a day as needed for muscle spasms, Disp: 60 tablet, Rfl: 2    esomeprazole (NexIUM) 20 mg capsule, Take 1 capsule (20 mg total) by mouth daily in the early morning, Disp: 90 capsule, Rfl: 1    gabapentin (Neurontin) 300 mg capsule, Take 1 capsule (300 mg total) by mouth 2 (two) times a day, Disp: 180 capsule, Rfl: 1    losartan (COZAAR) 50 mg tablet, Take 1 tablet (50 mg total) by mouth daily, Disp: 90 tablet, Rfl: 1    meclizine (ANTIVERT) 25 mg tablet, Take 1 tablet (25 mg total) by mouth 3 (three) times a day as needed for dizziness or nausea, Disp: 30 tablet, Rfl: 1    metFORMIN (GLUCOPHAGE) 1000 MG tablet, Take 1 tablet (1,000 mg total) by mouth 2 (two) times a day with meals, Disp: 180 tablet, Rfl: 1    oxyCODONE (ROXICODONE) 5 immediate release tablet, Take 1 tablet (5 mg total) by mouth every 4 (four) hours as needed for moderate pain for up to 10 doses Max Daily Amount: 30 mg, Disp: 10 tablet, Rfl: 0    simvastatin (ZOCOR) 40 mg tablet, Take 1 tablet (40 mg total) by mouth every evening, Disp: 90 tablet, Rfl: 1    sitaGLIPtin (Januvia) 100 mg tablet, Take 1 tablet (100 mg total) by mouth daily, Disp: 90 tablet, Rfl: 1    tamsulosin (FLOMAX) 0.4 mg, Take 1 capsule (0.4 mg total) by mouth daily, Disp: 90 capsule, Rfl: 1    ALLERGIES:  No Known Allergies        REVIEW OF SYSTEMS:  Review of Systems   Constitutional:  Negative for chills and fever.   HENT:  Negative for ear pain and sore throat.    Eyes:  Negative for pain and visual disturbance.   Respiratory:  Negative for cough and shortness of breath.    Cardiovascular:  Negative for chest pain and palpitations.   Gastrointestinal:  Negative for abdominal pain and vomiting.   Genitourinary:  Negative for dysuria and hematuria.   Musculoskeletal:  Negative for arthralgias and back pain.   Skin:  Negative for color change and rash.   Neurological:  Negative  for seizures and syncope.   All other systems reviewed and are negative.      VITALS:  There were no vitals filed for this visit.    LABS:  HgA1c:   Lab Results   Component Value Date    HGBA1C 7.4 (A) 01/08/2025     BMP:   Lab Results   Component Value Date    CALCIUM 8.1 (L) 02/15/2025    K 4.4 02/15/2025    CO2 15 (L) 02/15/2025     (H) 02/15/2025    BUN 11 02/15/2025    CREATININE 0.83 02/15/2025       _____________________________________________________  PHYSICAL EXAMINATION:  General: well developed and well nourished, alert, oriented times 3, and appears comfortable  Psychiatric: Normal  HEENT: Normocephalic, Atraumatic Trachea Midline, No torticollis  Pulmonary: No audible wheezing or respiratory distress   Abdomen/GI: Non tender, non distended   Cardiovascular: No pitting edema, 2+ radial pulse   Skin: No masses, erythema, lacerations, fluctation, ulcerations  Neurovascular: Sensation Intact to the Median, Ulnar, Radial Nerve, Motor Intact to the Median, Ulnar, Radial Nerve, and Pulses Intact  Musculoskeletal: Normal, except as noted in detailed exam and in HPI.      MUSCULOSKELETAL EXAMINATION:  Right hand:  SILT  Composite fist      Thumb:  Positive palpable flexor tendon nodule at the the A1 pulley level.   Positive tenderness to palpation over A1 pulley. Positive catching. Positive clicking.      Left hand:  SILT  Composite fist    ___________________________________________________  STUDIES REVIEWED:    No new images obtained/reviewed      PROCEDURES PERFORMED:  Hand/upper extremity injection: R thumb A1  Universal Protocol:  procedure performed by consultantConsent: Verbal consent obtained.  Risks and benefits: risks, benefits and alternatives were discussed  Consent given by: patient  Timeout called at: 4/3/2025 2:26 PM.  Patient understanding: patient states understanding of the procedure being performed  Site marked: the operative site was marked  Patient identity confirmed: verbally with  patient  Supporting Documentation  Indications: tendon swelling and pain   Procedure Details  Condition:trigger finger Location: thumb - R thumb A1   Needle size: 22 G  Ultrasound guidance: no  Approach: volar  Medications administered: 2.5 mL lidocaine 1 %; 3 mg betamethasone acetate-betamethasone sodium phosphate 6 (3-3) mg/mL  Patient tolerance: patient tolerated the procedure well with no immediate complications  Dressing:  Sterile dressing applied             _____________________________________________________      Scribe Attestation      I,:  Hima Arriaza am acting as a scribe while in the presence of the attending physician.:       I,:  Edward Schmitz MD personally performed the services described in this documentation    as scribed in my presence.:

## 2025-05-28 ENCOUNTER — OFFICE VISIT (OUTPATIENT)
Dept: FAMILY MEDICINE CLINIC | Facility: CLINIC | Age: 70
End: 2025-05-28

## 2025-05-28 ENCOUNTER — TELEPHONE (OUTPATIENT)
Dept: FAMILY MEDICINE CLINIC | Facility: CLINIC | Age: 70
End: 2025-05-28

## 2025-05-28 VITALS
WEIGHT: 146 LBS | RESPIRATION RATE: 16 BRPM | OXYGEN SATURATION: 100 % | DIASTOLIC BLOOD PRESSURE: 60 MMHG | SYSTOLIC BLOOD PRESSURE: 146 MMHG | HEIGHT: 61 IN | TEMPERATURE: 98 F | HEART RATE: 85 BPM | BODY MASS INDEX: 27.56 KG/M2

## 2025-05-28 DIAGNOSIS — E11.9 TYPE 2 DIABETES MELLITUS WITHOUT COMPLICATION, WITHOUT LONG-TERM CURRENT USE OF INSULIN (HCC): Primary | ICD-10-CM

## 2025-05-28 DIAGNOSIS — G62.9 NEUROPATHY: ICD-10-CM

## 2025-05-28 DIAGNOSIS — Z90.49 STATUS POST SMALL BOWEL RESECTION: ICD-10-CM

## 2025-05-28 LAB — SL AMB POCT HEMOGLOBIN AIC: 6.7 (ref ?–6.5)

## 2025-05-28 PROCEDURE — 99213 OFFICE O/P EST LOW 20 MIN: CPT | Performed by: FAMILY MEDICINE

## 2025-05-28 PROCEDURE — G2211 COMPLEX E/M VISIT ADD ON: HCPCS | Performed by: FAMILY MEDICINE

## 2025-05-28 PROCEDURE — 83036 HEMOGLOBIN GLYCOSYLATED A1C: CPT | Performed by: FAMILY MEDICINE

## 2025-05-28 RX ORDER — GABAPENTIN 300 MG/1
300 CAPSULE ORAL 2 TIMES DAILY
Qty: 180 CAPSULE | Refills: 1 | Status: SHIPPED | OUTPATIENT
Start: 2025-05-28

## 2025-05-28 NOTE — ASSESSMENT & PLAN NOTE
Lab Results   Component Value Date    HGBA1C 6.7 (A) 05/28/2025     Average fasting glucose: 130s  A1c improved from 7.4.  Continue metformin 1000 mg twice daily and Januvia 100 mg daily.  Advised exam performed today.  Follow-up in 3 months for repeat A1c.    Orders:    POCT hemoglobin A1c    IRIS Diabetic eye exam

## 2025-05-28 NOTE — ASSESSMENT & PLAN NOTE
Requesting referral to general surgery to reestablish care and to complete routine screening colonoscopy.     Orders:    Ambulatory Referral to General Surgery; Future

## 2025-05-28 NOTE — PROGRESS NOTES
Name: Duncan Sánchez      : 1955      MRN: [id number]  Encounter Provider: Umair Moore MD  Encounter Date: 2025   Encounter department: [facility]  :  Assessment & Plan  Type 2 diabetes mellitus without complication, without long-term current use of insulin ([facility])    Lab Results   Component Value Date    HGBA1C 6.7 (A) 2025     Average fasting glucose: 130s  A1c improved from 7.4.  Continue metformin 1000 mg twice daily and Januvia 100 mg daily.  Advised exam performed today.  Follow-up in 3 months for repeat A1c.    Orders:    POCT hemoglobin A1c    IRIS Diabetic eye exam    Neuropathy    Orders:    gabapentin (Neurontin) 300 mg capsule; Take 1 capsule (300 mg total) by mouth 2 (two) times a day    Status post small bowel resection  Requesting referral to general surgery to reestablish care and to complete routine screening colonoscopy.     Orders:    Ambulatory Referral to General Surgery; Future           History of Present Illness   [id number]    70-year-old male who is new to me presents to the clinic with significant other for follow-up of diabetes and refill of gabapentin.  He has been adherent with metformin and Januvia without noticing any side effects.  He reports having diarrhea twice a day for the past 2 weeks.  He was previously seen by General surgery for constipation and was recommended high-fiber diet and supplementation with Metamucil or MiraLAX.  Reports having diarrhea with melena at times with all types of foods.        Review of Systems   Constitutional:  Negative for chills, fatigue and fever.   Eyes:  Negative for visual disturbance.   Respiratory:  Negative for cough, chest tightness and shortness of breath.    Cardiovascular:  Negative for chest pain and palpitations.   Gastrointestinal:  Positive for blood in stool and diarrhea. Negative for abdominal pain, constipation, nausea and vomiting.   Endocrine: Negative for  "polydipsia, polyphagia and polyuria.   Musculoskeletal:  Positive for gait problem (Ambulates with cane).   Neurological:  Negative for dizziness, weakness, light-headedness and headaches.       Objective   /60 (BP Location: Left arm, Patient Position: Sitting, Cuff Size: Standard)   Pulse 85   Temp 98 °F (36.7 °C) (Temporal)   Resp 16   Ht 5' 1\" (1.549 m)   Wt 66.2 kg (146 lb)   SpO2 100%   BMI 27.59 kg/m²      Physical Exam  Vitals reviewed.   Constitutional:       General: He is not in acute distress.     Appearance: Normal appearance. He is well-developed.     Eyes:      Extraocular Movements: Extraocular movements intact.       Cardiovascular:      Rate and Rhythm: Normal rate and regular rhythm.      Pulses: Normal pulses.           Radial pulses are 2+ on the right side.      Heart sounds: Normal heart sounds.   Pulmonary:      Effort: Pulmonary effort is normal. No respiratory distress.      Breath sounds: Normal breath sounds.   Chest:      Chest wall: No tenderness.   Abdominal:      General: Bowel sounds are normal.      Palpations: Abdomen is soft.      Tenderness: There is no abdominal tenderness.     Musculoskeletal:      Cervical back: Normal range of motion.      Right lower leg: No edema.      Left lower leg: No edema.     Skin:     General: Skin is warm and dry.      Capillary Refill: Capillary refill takes less than 2 seconds.     Neurological:      Mental Status: He is alert and oriented to person, place, and time.         " No

## 2025-05-28 NOTE — ASSESSMENT & PLAN NOTE
Orders:    gabapentin (Neurontin) 300 mg capsule; Take 1 capsule (300 mg total) by mouth 2 (two) times a day

## 2025-06-19 DIAGNOSIS — M65.311 TRIGGER THUMB OF RIGHT HAND: Primary | ICD-10-CM

## 2025-06-19 PROCEDURE — 99214 OFFICE O/P EST MOD 30 MIN: CPT | Performed by: SURGERY

## 2025-06-19 NOTE — H&P (VIEW-ONLY)
Name: Duncan Sánchez      : 1955      MRN: [id number]  Encounter Provider: Edward Schmitz MD  Encounter Date: 2025   Encounter department: [facility] [place]      Assessment & Plan  Trigger thumb of right hand  Patient is a candidate for right trigger thumb release under local anesthesia.  He wishes to pursue this option.  Risks, benefits and alternative treatments were discussed with the patient. These included but are not limited to: bleeding, infection, damage to nerves, vessels or tendons, allergic reaction to agents, possible increase in pain, tendon or ligament rupture, weakening of bone or soft tissues, and/or elevation in blood sugar. Patient understands and would like to proceed with the proposed procedure.    Follow-up 2 weeks after surgery.            Chief Complaint   Patient presents with    Right Hand - Pain       History of Present Illness   Patient is a 70 y.o. male here for follow-up.  Today he states the steroid injection provided at last visit did not provide much relief.  He still has pain and clicking locking of the right thumb.  It is affecting his everyday activities.  No other new symptoms reported today.  Patient is Right Handed.      Review of Systems A 10 point ROS was performed; negative except as noted above.   Past Surgical History[1]   Past Medical History[2]   Allergies[3]   Objective   Current Outpatient Medications   Medication Instructions    acetaminophen (TYLENOL) 650 mg, Every 6 hours PRN    cyclobenzaprine (FLEXERIL) 10 mg, Oral, 2 times daily PRN    esomeprazole (NEXIUM) 20 mg, Oral, Daily (early morning)    gabapentin (NEURONTIN) 300 mg, Oral, 2 times daily    losartan (COZAAR) 50 mg, Oral, Daily    meclizine (ANTIVERT) 25 mg, Oral, 3 times daily PRN    metFORMIN (GLUCOPHAGE) 1,000 mg, Oral, 2 times daily with meals    oxyCODONE (ROXICODONE) 5 mg, Oral, Every 4 hours PRN    simvastatin (ZOCOR) 40 mg, Oral, Every evening     sitaGLIPtin (JANUVIA) 100 mg, Oral, Daily    tamsulosin (FLOMAX) 0.4 mg, Oral, Daily        Imaging  No new imaging today     Physical Exam    General appearance:  NAD   Cardiac:  Regular rate  Lungs:  Unlabored breathing  Abdomen:  Non-distended    Orthopedic Exam:    Right thumb    Inspection: No open wounds or erythema.  No ecchymosis or swelling.    Palpation: Tender to palpation A1 pulley with palpable nodule    Range-of-motion: Crepitus and clicking with range of motion    Strength:  Normal    Sensation:  ILT radial and ulnar side of the digit.    Special Tests:  Palpable radial pulse  UE warm and well perfused.  Good cap refill at the fingertip.      Constantin Aguilera PA-C            [1]   Past Surgical History:  Procedure Laterality Date    HEMICOLOECTOMY W/ ANASTOMOSIS N/A 2/14/2025    Procedure: LAP ILEOCOLIC RESECTION, HAND-ASSISTED;  Surgeon: Pablo Conner MD;  Location: [facility];  Service: General   [2]   Past Medical History:  Diagnosis Date    Benign prostatic hyperplasia 08/10/2024    History of CVA (cerebrovascular accident) 08/06/2024    Hyperlipidemia 08/06/2024    Neuropathy 09/23/2024    Primary hypertension 08/06/2024    Type 2 diabetes mellitus without complication, without long-term current use of insulin (HCC) 08/06/2024   [3] No Known Allergies

## 2025-06-19 NOTE — ASSESSMENT & PLAN NOTE
Patient is a candidate for right trigger thumb release under local anesthesia.  He wishes to pursue this option.  Risks, benefits and alternative treatments were discussed with the patient. These included but are not limited to: bleeding, infection, damage to nerves, vessels or tendons, allergic reaction to agents, possible increase in pain, tendon or ligament rupture, weakening of bone or soft tissues, and/or elevation in blood sugar. Patient understands and would like to proceed with the proposed procedure.    Follow-up 2 weeks after surgery.

## 2025-06-19 NOTE — PROGRESS NOTES
Name: Duncan Sánchez      : 1955      MRN: [id number]  Encounter Provider: Edward Schmitz MD  Encounter Date: 2025   Encounter department: [facility] [place]      Assessment & Plan  Trigger thumb of right hand  Patient is a candidate for right trigger thumb release under local anesthesia.  He wishes to pursue this option.  Risks, benefits and alternative treatments were discussed with the patient. These included but are not limited to: bleeding, infection, damage to nerves, vessels or tendons, allergic reaction to agents, possible increase in pain, tendon or ligament rupture, weakening of bone or soft tissues, and/or elevation in blood sugar. Patient understands and would like to proceed with the proposed procedure.    Follow-up 2 weeks after surgery.            Chief Complaint   Patient presents with    Right Hand - Pain       History of Present Illness   Patient is a 70 y.o. male here for follow-up.  Today he states the steroid injection provided at last visit did not provide much relief.  He still has pain and clicking locking of the right thumb.  It is affecting his everyday activities.  No other new symptoms reported today.  Patient is Right Handed.      Review of Systems A 10 point ROS was performed; negative except as noted above.   Past Surgical History[1]   Past Medical History[2]   Allergies[3]   Objective   Current Outpatient Medications   Medication Instructions    acetaminophen (TYLENOL) 650 mg, Every 6 hours PRN    cyclobenzaprine (FLEXERIL) 10 mg, Oral, 2 times daily PRN    esomeprazole (NEXIUM) 20 mg, Oral, Daily (early morning)    gabapentin (NEURONTIN) 300 mg, Oral, 2 times daily    losartan (COZAAR) 50 mg, Oral, Daily    meclizine (ANTIVERT) 25 mg, Oral, 3 times daily PRN    metFORMIN (GLUCOPHAGE) 1,000 mg, Oral, 2 times daily with meals    oxyCODONE (ROXICODONE) 5 mg, Oral, Every 4 hours PRN    simvastatin (ZOCOR) 40 mg, Oral, Every evening     sitaGLIPtin (JANUVIA) 100 mg, Oral, Daily    tamsulosin (FLOMAX) 0.4 mg, Oral, Daily        Imaging  No new imaging today     Physical Exam    General appearance:  NAD   Cardiac:  Regular rate  Lungs:  Unlabored breathing  Abdomen:  Non-distended    Orthopedic Exam:    Right thumb    Inspection: No open wounds or erythema.  No ecchymosis or swelling.    Palpation: Tender to palpation A1 pulley with palpable nodule    Range-of-motion: Crepitus and clicking with range of motion    Strength:  Normal    Sensation:  ILT radial and ulnar side of the digit.    Special Tests:  Palpable radial pulse  UE warm and well perfused.  Good cap refill at the fingertip.      Constantin Aguilera PA-C            [1]   Past Surgical History:  Procedure Laterality Date    HEMICOLOECTOMY W/ ANASTOMOSIS N/A 2/14/2025    Procedure: LAP ILEOCOLIC RESECTION, HAND-ASSISTED;  Surgeon: Pablo Conner MD;  Location: [facility];  Service: General   [2]   Past Medical History:  Diagnosis Date    Benign prostatic hyperplasia 08/10/2024    History of CVA (cerebrovascular accident) 08/06/2024    Hyperlipidemia 08/06/2024    Neuropathy 09/23/2024    Primary hypertension 08/06/2024    Type 2 diabetes mellitus without complication, without long-term current use of insulin (HCC) 08/06/2024   [3] No Known Allergies

## 2025-06-20 ENCOUNTER — TELEPHONE (OUTPATIENT)
Age: 70
End: 2025-06-20

## 2025-06-20 NOTE — TELEPHONE ENCOUNTER
Per Diane in Dr. Gaffney's office, the patient's appointment on 6/25/25 needed to be canceled as he needs to see GI first before an appointment with Dr. Gaffney.    Using the Language Line , I called the patient's caregiver Nichelle and explained that we needed to cancel his appointment on 6/25/25 with Dr. Nicholas Gaffney as the patient will need to be seen by Gastroenterology first. Nichelle stated she understood.     I relayed that we will have Gastroenterology call back to make their next appointment.

## 2025-06-25 ENCOUNTER — TELEPHONE (OUTPATIENT)
Dept: FAMILY MEDICINE CLINIC | Facility: CLINIC | Age: 70
End: 2025-06-25

## 2025-06-25 ENCOUNTER — TELEPHONE (OUTPATIENT)
Age: 70
End: 2025-06-25

## 2025-07-11 ENCOUNTER — HOSPITAL ENCOUNTER (OUTPATIENT)
Age: 70
Setting detail: OUTPATIENT SURGERY
Discharge: HOME | End: 2025-07-11
Attending: SURGERY | Admitting: SURGERY
Payer: COMMERCIAL

## 2025-07-11 VITALS
HEIGHT: 63 IN | BODY MASS INDEX: 26.93 KG/M2 | TEMPERATURE: 97.8 F | DIASTOLIC BLOOD PRESSURE: 88 MMHG | HEART RATE: 64 BPM | RESPIRATION RATE: 18 BRPM | WEIGHT: 152 LBS | SYSTOLIC BLOOD PRESSURE: 168 MMHG | OXYGEN SATURATION: 99 %

## 2025-07-11 DIAGNOSIS — M65.311 TRIGGER THUMB OF RIGHT HAND: Primary | ICD-10-CM

## 2025-07-11 PROCEDURE — 26055 INCISE FINGER TENDON SHEATH: CPT | Performed by: SURGERY

## 2025-07-11 PROCEDURE — 26055 INCISE FINGER TENDON SHEATH: CPT | Performed by: PHYSICIAN ASSISTANT

## 2025-07-11 RX ORDER — MAGNESIUM HYDROXIDE 1200 MG/15ML
LIQUID ORAL AS NEEDED
Status: DISCONTINUED | OUTPATIENT
Start: 2025-07-11 | End: 2025-07-11 | Stop reason: HOSPADM

## 2025-07-11 RX ORDER — ACETAMINOPHEN 325 MG/1
650 TABLET ORAL EVERY 6 HOURS PRN
Status: DISCONTINUED | OUTPATIENT
Start: 2025-07-11 | End: 2025-07-11 | Stop reason: HOSPADM

## 2025-07-11 RX ORDER — NAPROXEN 500 MG/1
500 TABLET ORAL 2 TIMES DAILY WITH MEALS
Qty: 10 TABLET | Refills: 0 | Status: SHIPPED | OUTPATIENT
Start: 2025-07-11 | End: 2025-07-16

## 2025-07-11 NOTE — INTERVAL H&P NOTE
H&P reviewed. After examining the patient I find no changes in the patients condition since the H&P had been written.    Vitals:    07/11/25 0955   BP: (!) 179/55   Pulse: 65   Resp: 20   Temp: 97.8 °F (36.6 °C)   SpO2: 98%

## 2025-07-11 NOTE — OP NOTE
OPERATIVE REPORT  PATIENT NAME: Duncan Sánchez    :  1955  MRN: [id number]  Pt Location: [facility] OR ROOM 06    SURGERY DATE: 2025    Surgeons and Role:     * Edward Schmitz MD - Primary     * Constantin Aguilera PA-C - Assisting    Preop Diagnosis:  Trigger thumb of right hand [M65.311]    Post-Op Diagnosis Codes:     * Trigger thumb of right hand [M65.311]    Procedure(s):  Right - RELEASE TRIGGER FINGER. RIGHT THUMB    Specimen(s):  * No specimens in log *    Estimated Blood Loss:   Minimal    Drains:  * No LDAs found *    Anesthesia Type:   Local    Operative Indications:  Trigger thumb of right hand [M65.311]      Operative Findings:  Full active thumb flexion and extension after A1 pulley release; thickened A1 pulley       Complications:   None    Procedure and Technique:  The patient's right hand was cleansed with alcohol.  A field block was performed with marcaine 0.25% with epinephrine and lidocaine 1% with epinephrine.  The right upper extremity was then prepped and draped in a sterile fashion.  An incision was made in the palmar digital crease in line with the thumb.  Dissection was performed down to the flexor tendon sheath.  The A1 pulley was identified, and incised with a scalpel.  The release was extended proximally and distally with tenotomy scissors.  The patient was asked to flex and extend the digit which he was able to do without any evidence of locking.    The wound was irrigated with normal saline, and closed with 4-0 nylon sutures in an horizontal mattress interrupted manner.  Xeroform was applied followed by 4x4 gauze.  An ace bandage over wrap was applied.  The patient was transferred to phase 2 recovery in stable condition.      I was present for the entire procedure.    Patient Disposition:  PACU     My Assistant was necessary throughout the procedure(s) for retraction and positioning.    I understand that section 1842 (b)(7)(D) of the Social Security Act generally prohibits Medicare  physician fee schedule payment for the services of assistants-at-surgery in [facility] hospitals when qualified residents are available to furnish such services. I certify that the services for which payment is claimed were medically necessary, and that no qualified resident was available to perform the services. I further understand that these services are subject to post-payment review by the Medicare carrier.       SIGNATURE: Edward Schmitz MD  DATE: July 11, 2025  TIME: 11:42 AM

## 2025-07-31 DIAGNOSIS — M65.311 TRIGGER THUMB OF RIGHT HAND: Primary | ICD-10-CM

## 2025-07-31 PROCEDURE — 99024 POSTOP FOLLOW-UP VISIT: CPT | Performed by: SURGERY

## 2025-08-08 DIAGNOSIS — T75.3XXA MOTION SICKNESS, INITIAL ENCOUNTER: ICD-10-CM

## 2025-08-08 RX ORDER — MECLIZINE HYDROCHLORIDE 25 MG/1
TABLET ORAL
Qty: 30 TABLET | Refills: 1 | Status: SHIPPED | OUTPATIENT
Start: 2025-08-08

## (undated) DEVICE — Device

## (undated) DEVICE — MAYO STAND COVER: Brand: CONVERTORS

## (undated) DEVICE — BETHLEHEM UNIVERSAL LAPAROTOMY: Brand: CARDINAL HEALTH

## (undated) DEVICE — SUT MONOCRYL 4-0 PS-2 27 IN Y426H

## (undated) DEVICE — SUT PDS II 1 TP-1 96 IN Z880G

## (undated) DEVICE — INSUFFLATION NEEDLE TO ESTABLISH PNEUMOPERITONEUM.: Brand: INSUFFLATION NEEDLE

## (undated) DEVICE — 4-PORT MANIFOLD: Brand: NEPTUNE 2

## (undated) DEVICE — PACK PBDS PLASTIC HAND RF

## (undated) DEVICE — TROCAR: Brand: KII FIOS FIRST ENTRY

## (undated) DEVICE — SUT CHROMIC 3-0 SH 27 IN G122H

## (undated) DEVICE — STAPLER WITH DST SERIES TECHNOLOGY: Brand: GIA

## (undated) DEVICE — REM POLYHESIVE ADULT PATIENT RETURN ELECTRODE: Brand: VALLEYLAB

## (undated) DEVICE — LOADING UNIT WITH DST SERIES TECHNOLOGY: Brand: GIA

## (undated) DEVICE — INTENDED FOR TISSUE SEPARATION, AND OTHER PROCEDURES THAT REQUIRE A SHARP SURGICAL BLADE TO PUNCTURE OR CUT.: Brand: BARD-PARKER SAFETY BLADES SIZE 15, STERILE

## (undated) DEVICE — ACCESS PLATFORM FOR MINIMALLY INVASIVE SURGERY.: Brand: GELPORT® LAPAROSCOPIC  SYSTEM

## (undated) DEVICE — GLOVE SRG BIOGEL 8

## (undated) DEVICE — MICRO HVTSA, 0.5G AND HVTSA SOURCEMARK PRODUCT CODE M1206 AND M1206-01: Brand: EXOFIN MICRO HVTSA, 0.5G

## (undated) DEVICE — TROCAR: Brand: KII SLEEVE

## (undated) DEVICE — TIBURON LAPAROSCOPIC ABDOMINAL DRAPE: Brand: CONVERTORS

## (undated) DEVICE — LAPAROTOMY SPONGE - RF AND X-RAY DETECTABLE PRE-WASHED: Brand: SITUATE

## (undated) DEVICE — INTENDED FOR TISSUE SEPARATION, AND OTHER PROCEDURES THAT REQUIRE A SHARP SURGICAL BLADE TO PUNCTURE OR CUT.: Brand: BARD-PARKER SAFETY BLADES SIZE 10, STERILE

## (undated) DEVICE — ANTIBACTERIAL UNDYED BRAIDED (POLYGLACTIN 910), SYNTHETIC ABSORBABLE SUTURE: Brand: COATED VICRYL

## (undated) DEVICE — ENDOPATH XCEL BLADELESS TROCARS WITH STABILITY SLEEVES: Brand: ENDOPATH XCEL

## (undated) DEVICE — SUT VICRYL 2-0 CT-1 36 IN J945H

## (undated) DEVICE — 3M™ IOBAN™ 2 ANTIMICROBIAL INCISE DRAPE 6650EZ: Brand: IOBAN™ 2

## (undated) DEVICE — MARYLAND JAW LAPAROSCOPIC SEALER/DIVIDER COATED: Brand: LIGASURE

## (undated) DEVICE — NEEDLE 25G X 1 1/2

## (undated) DEVICE — METZENBAUM ADTEC SINGLE USE DISSECTING SCISSORS, SHAFT ONLY, MONOPOLAR, CURVED TO LEFT, WORKING LENGTH: 12 1/4", (310 MM), DIAM. 5 MM, INSULATED, DOUBLE ACTION, STERILE, DISPOSABLE, PACKAGE OF 10 PIECES: Brand: AESCULAP

## (undated) DEVICE — STAPLER WITH DST SERIES TECHNOLOGY: Brand: TA